# Patient Record
Sex: MALE | Race: WHITE | Employment: OTHER | URBAN - NONMETROPOLITAN AREA
[De-identification: names, ages, dates, MRNs, and addresses within clinical notes are randomized per-mention and may not be internally consistent; named-entity substitution may affect disease eponyms.]

---

## 2017-06-12 ENCOUNTER — HOSPITAL ENCOUNTER (OUTPATIENT)
Dept: PHYSICAL THERAPY | Age: 82
Setting detail: THERAPIES SERIES
Discharge: HOME | End: 2017-06-12
Payer: MEDICARE

## 2017-06-12 PROCEDURE — G8979 MOBILITY GOAL STATUS: HCPCS

## 2017-06-12 PROCEDURE — G8978 MOBILITY CURRENT STATUS: HCPCS

## 2017-06-12 PROCEDURE — 97161 PT EVAL LOW COMPLEX 20 MIN: CPT

## 2017-06-12 PROCEDURE — 97110 THERAPEUTIC EXERCISES: CPT

## 2017-06-12 ASSESSMENT — PAIN DESCRIPTION - DESCRIPTORS: DESCRIPTORS: SHARP;DULL

## 2017-06-12 ASSESSMENT — PAIN SCALES - GENERAL: PAINLEVEL_OUTOF10: 2

## 2017-06-12 ASSESSMENT — PAIN DESCRIPTION - FREQUENCY: FREQUENCY: CONTINUOUS

## 2017-06-12 ASSESSMENT — PAIN DESCRIPTION - ORIENTATION: ORIENTATION: RIGHT

## 2017-06-12 ASSESSMENT — PAIN DESCRIPTION - PAIN TYPE: TYPE: ACUTE PAIN

## 2017-06-12 ASSESSMENT — PAIN DESCRIPTION - ONSET: ONSET: SUDDEN

## 2017-06-15 ENCOUNTER — HOSPITAL ENCOUNTER (OUTPATIENT)
Dept: PHYSICAL THERAPY | Age: 82
Setting detail: THERAPIES SERIES
Discharge: HOME | End: 2017-06-15
Payer: MEDICARE

## 2017-06-15 PROCEDURE — 97113 AQUATIC THERAPY/EXERCISES: CPT

## 2017-06-15 ASSESSMENT — PAIN DESCRIPTION - LOCATION: LOCATION: HIP

## 2017-06-15 ASSESSMENT — PAIN DESCRIPTION - ORIENTATION: ORIENTATION: RIGHT

## 2017-06-15 ASSESSMENT — PAIN DESCRIPTION - PAIN TYPE: TYPE: ACUTE PAIN

## 2017-06-15 ASSESSMENT — PAIN SCALES - GENERAL: PAINLEVEL_OUTOF10: 2

## 2017-06-16 ENCOUNTER — HOSPITAL ENCOUNTER (OUTPATIENT)
Dept: PHYSICAL THERAPY | Age: 82
Setting detail: THERAPIES SERIES
Discharge: HOME | End: 2017-06-16
Payer: MEDICARE

## 2017-06-16 PROCEDURE — 97113 AQUATIC THERAPY/EXERCISES: CPT

## 2017-06-16 ASSESSMENT — PAIN DESCRIPTION - ORIENTATION: ORIENTATION: RIGHT

## 2017-06-16 ASSESSMENT — PAIN DESCRIPTION - LOCATION: LOCATION: HIP

## 2017-06-16 ASSESSMENT — PAIN SCALES - GENERAL: PAINLEVEL_OUTOF10: 2

## 2017-06-19 ENCOUNTER — HOSPITAL ENCOUNTER (OUTPATIENT)
Dept: PHYSICAL THERAPY | Age: 82
Setting detail: THERAPIES SERIES
Discharge: HOME | End: 2017-06-19
Payer: MEDICARE

## 2017-06-19 PROCEDURE — 97113 AQUATIC THERAPY/EXERCISES: CPT

## 2017-06-21 ENCOUNTER — HOSPITAL ENCOUNTER (OUTPATIENT)
Dept: PHYSICAL THERAPY | Age: 82
Setting detail: THERAPIES SERIES
Discharge: HOME | End: 2017-06-21
Payer: MEDICARE

## 2017-06-21 PROCEDURE — 97110 THERAPEUTIC EXERCISES: CPT

## 2017-06-21 PROCEDURE — G0283 ELEC STIM OTHER THAN WOUND: HCPCS

## 2017-06-21 ASSESSMENT — PAIN SCALES - GENERAL: PAINLEVEL_OUTOF10: 2

## 2017-06-21 ASSESSMENT — PAIN DESCRIPTION - ORIENTATION: ORIENTATION: RIGHT

## 2017-06-21 ASSESSMENT — PAIN DESCRIPTION - PAIN TYPE: TYPE: ACUTE PAIN

## 2017-06-21 ASSESSMENT — PAIN DESCRIPTION - LOCATION: LOCATION: HIP

## 2017-06-23 ENCOUNTER — HOSPITAL ENCOUNTER (OUTPATIENT)
Dept: PHYSICAL THERAPY | Age: 82
Setting detail: THERAPIES SERIES
Discharge: HOME | End: 2017-06-23
Payer: MEDICARE

## 2017-06-23 PROCEDURE — 97113 AQUATIC THERAPY/EXERCISES: CPT

## 2017-06-23 ASSESSMENT — PAIN DESCRIPTION - ORIENTATION: ORIENTATION: RIGHT

## 2017-06-23 ASSESSMENT — PAIN DESCRIPTION - LOCATION: LOCATION: HIP

## 2017-06-23 ASSESSMENT — PAIN SCALES - GENERAL: PAINLEVEL_OUTOF10: 2

## 2017-06-23 ASSESSMENT — PAIN DESCRIPTION - DESCRIPTORS: DESCRIPTORS: ACHING;DULL

## 2017-06-26 ENCOUNTER — HOSPITAL ENCOUNTER (OUTPATIENT)
Dept: PHYSICAL THERAPY | Age: 82
Setting detail: THERAPIES SERIES
Discharge: HOME | End: 2017-06-26
Payer: MEDICARE

## 2017-06-26 PROCEDURE — 97113 AQUATIC THERAPY/EXERCISES: CPT

## 2017-06-26 ASSESSMENT — PAIN SCALES - GENERAL: PAINLEVEL_OUTOF10: 2

## 2017-06-26 ASSESSMENT — PAIN DESCRIPTION - LOCATION: LOCATION: HIP

## 2017-06-26 ASSESSMENT — PAIN DESCRIPTION - PAIN TYPE: TYPE: ACUTE PAIN

## 2017-06-26 ASSESSMENT — PAIN DESCRIPTION - DESCRIPTORS: DESCRIPTORS: ACHING

## 2017-06-26 ASSESSMENT — PAIN DESCRIPTION - ORIENTATION: ORIENTATION: RIGHT

## 2017-06-28 ENCOUNTER — HOSPITAL ENCOUNTER (OUTPATIENT)
Dept: PHYSICAL THERAPY | Age: 82
Setting detail: THERAPIES SERIES
Discharge: HOME | End: 2017-06-28
Payer: MEDICARE

## 2017-06-28 PROCEDURE — G8979 MOBILITY GOAL STATUS: HCPCS

## 2017-06-28 PROCEDURE — 97110 THERAPEUTIC EXERCISES: CPT

## 2017-06-28 PROCEDURE — G8978 MOBILITY CURRENT STATUS: HCPCS

## 2017-06-28 ASSESSMENT — PAIN DESCRIPTION - ORIENTATION: ORIENTATION: RIGHT

## 2017-06-28 ASSESSMENT — PAIN DESCRIPTION - PAIN TYPE: TYPE: ACUTE PAIN

## 2017-06-28 ASSESSMENT — PAIN DESCRIPTION - DESCRIPTORS: DESCRIPTORS: ACHING

## 2017-06-28 ASSESSMENT — PAIN DESCRIPTION - LOCATION: LOCATION: HIP

## 2017-06-28 ASSESSMENT — PAIN DESCRIPTION - PROGRESSION: CLINICAL_PROGRESSION: RAPIDLY IMPROVING

## 2017-06-28 ASSESSMENT — PAIN DESCRIPTION - FREQUENCY: FREQUENCY: CONTINUOUS

## 2017-06-28 ASSESSMENT — PAIN SCALES - GENERAL: PAINLEVEL_OUTOF10: 1

## 2017-06-30 ENCOUNTER — HOSPITAL ENCOUNTER (OUTPATIENT)
Dept: PHYSICAL THERAPY | Age: 82
Setting detail: THERAPIES SERIES
Discharge: HOME | End: 2017-06-30
Payer: MEDICARE

## 2017-06-30 PROCEDURE — 97113 AQUATIC THERAPY/EXERCISES: CPT

## 2017-06-30 ASSESSMENT — PAIN DESCRIPTION - LOCATION: LOCATION: HIP

## 2017-06-30 ASSESSMENT — PAIN DESCRIPTION - DESCRIPTORS: DESCRIPTORS: ACHING

## 2017-06-30 ASSESSMENT — PAIN DESCRIPTION - PAIN TYPE: TYPE: ACUTE PAIN

## 2017-06-30 ASSESSMENT — PAIN SCALES - GENERAL: PAINLEVEL_OUTOF10: 2

## 2017-06-30 ASSESSMENT — PAIN DESCRIPTION - ORIENTATION: ORIENTATION: RIGHT

## 2017-07-03 ENCOUNTER — HOSPITAL ENCOUNTER (OUTPATIENT)
Dept: PHYSICAL THERAPY | Age: 82
Setting detail: THERAPIES SERIES
Discharge: HOME | End: 2017-07-03
Payer: MEDICARE

## 2017-07-03 PROCEDURE — 97113 AQUATIC THERAPY/EXERCISES: CPT

## 2017-07-03 ASSESSMENT — PAIN DESCRIPTION - DESCRIPTORS: DESCRIPTORS: ACHING

## 2017-07-03 ASSESSMENT — PAIN DESCRIPTION - PAIN TYPE: TYPE: ACUTE PAIN

## 2017-07-03 ASSESSMENT — PAIN DESCRIPTION - ORIENTATION: ORIENTATION: RIGHT

## 2017-07-03 ASSESSMENT — PAIN SCALES - GENERAL: PAINLEVEL_OUTOF10: 1

## 2017-07-03 ASSESSMENT — PAIN DESCRIPTION - LOCATION: LOCATION: HIP

## 2017-07-05 ENCOUNTER — HOSPITAL ENCOUNTER (OUTPATIENT)
Dept: PHYSICAL THERAPY | Age: 82
Setting detail: THERAPIES SERIES
Discharge: HOME | End: 2017-07-05
Payer: MEDICARE

## 2017-07-05 PROCEDURE — 97110 THERAPEUTIC EXERCISES: CPT

## 2017-07-05 ASSESSMENT — PAIN DESCRIPTION - LOCATION: LOCATION: HIP

## 2017-07-05 ASSESSMENT — PAIN DESCRIPTION - ORIENTATION: ORIENTATION: RIGHT

## 2017-07-05 ASSESSMENT — PAIN SCALES - GENERAL: PAINLEVEL_OUTOF10: 1

## 2017-07-07 ENCOUNTER — HOSPITAL ENCOUNTER (OUTPATIENT)
Dept: PHYSICAL THERAPY | Age: 82
Setting detail: THERAPIES SERIES
Discharge: HOME | End: 2017-07-07
Payer: MEDICARE

## 2017-07-07 PROCEDURE — 97113 AQUATIC THERAPY/EXERCISES: CPT

## 2017-07-07 ASSESSMENT — PAIN DESCRIPTION - DESCRIPTORS: DESCRIPTORS: ACHING

## 2017-07-07 ASSESSMENT — PAIN DESCRIPTION - ORIENTATION: ORIENTATION: RIGHT

## 2017-07-07 ASSESSMENT — PAIN SCALES - GENERAL: PAINLEVEL_OUTOF10: 1

## 2017-07-07 ASSESSMENT — PAIN DESCRIPTION - PAIN TYPE: TYPE: ACUTE PAIN

## 2017-07-07 ASSESSMENT — PAIN DESCRIPTION - LOCATION: LOCATION: HIP

## 2017-10-31 ENCOUNTER — HOSPITAL ENCOUNTER (OUTPATIENT)
Age: 82
Discharge: HOME | End: 2017-10-31
Payer: MEDICARE

## 2017-10-31 ENCOUNTER — HOSPITAL ENCOUNTER (OUTPATIENT)
Dept: GENERAL RADIOLOGY | Age: 82
Discharge: HOME | End: 2017-10-31
Payer: MEDICARE

## 2017-10-31 DIAGNOSIS — R06.02 SOB (SHORTNESS OF BREATH): ICD-10-CM

## 2017-10-31 DIAGNOSIS — R07.89 CHEST PRESSURE: ICD-10-CM

## 2017-10-31 PROCEDURE — 71020 XR CHEST STANDARD TWO VW: CPT

## 2017-11-27 ENCOUNTER — HOSPITAL ENCOUNTER (OUTPATIENT)
Dept: NON INVASIVE DIAGNOSTICS | Age: 82
Discharge: HOME | End: 2017-11-27
Payer: MEDICARE

## 2017-11-27 PROCEDURE — 93017 CV STRESS TEST TRACING ONLY: CPT

## 2017-11-27 PROCEDURE — A9500 TC99M SESTAMIBI: HCPCS | Performed by: INTERNAL MEDICINE

## 2017-11-27 PROCEDURE — 78452 HT MUSCLE IMAGE SPECT MULT: CPT

## 2017-11-27 PROCEDURE — 3430000000 HC RX DIAGNOSTIC RADIOPHARMACEUTICAL: Performed by: INTERNAL MEDICINE

## 2017-11-27 RX ADMIN — Medication 33.1 MILLICURIE: at 09:47

## 2017-11-28 ENCOUNTER — HOSPITAL ENCOUNTER (OUTPATIENT)
Dept: NON INVASIVE DIAGNOSTICS | Age: 82
Discharge: HOME | End: 2017-11-28
Payer: MEDICARE

## 2017-11-28 PROCEDURE — 3430000000 HC RX DIAGNOSTIC RADIOPHARMACEUTICAL: Performed by: INTERNAL MEDICINE

## 2017-11-28 PROCEDURE — A9500 TC99M SESTAMIBI: HCPCS | Performed by: INTERNAL MEDICINE

## 2017-11-28 RX ADMIN — Medication 32.1 MILLICURIE: at 13:09

## 2017-11-30 NOTE — PROCEDURES
[facility] [address], [address]                                CARDIAC STRESS TEST    PATIENT NAME: Irene Gibson                        :        1935  MED REC NO:   [id number]                              ROOM:  ACCOUNT NO:   [de-identified]                           ADMIT DATE: 2017  PROVIDER:     Brandyn Cortes [facility]    CARDIOVASCULAR DIAGNOSTIC DEPARTMENT    DATE OF STUDY:  2017    ORDERING PROVIDER:  Mervat Sinclair MD    PRIMARY CARE PROVIDER:  Mervat Sinclair MD    INTERPRETING PHYSICIAN:  Brandyn Villareal. Kris Gavin MD    EXERCISE MYOCARDIAL PERFUSION STRESS TEST REPORT    Stress/rest single-isotope SPECT imaging with exercise stress and gated  SPECT imaging    INDICATION:  Assessment of recent chest pain and/or discomfort  Assessment of a cardiac cause of:  Dyspnea    CLINICAL HISTORY:  The patient is a 80-year-old man with no known coronary  artery disease. Previous cardiac history includes:  Stress test    Other previous history includes:  Chest pain, emphysema, dyspnea,  lightheadedness    Symptoms just prior to testing included:  None    Relevant medications:  None    PROCEDURE:  The patient performed treadmill exercise using a Tashi  protocol, completing 2:04 minutes and completing an estimated workload of  6.64 metabolic equivalents (METS). The test was terminated due to fatigue and shortness of breath. The heart rate was 78 beats per minute at baseline and increased to 127  beats per minute at peak exercise, which was 92% of the maximum predicted  heart rate. The rest blood pressure was 140/70 mmHg and increased to 212/60  mmHg, which is a hypertensive response. During the procedure, the patient  developed fatigue and shortness of breath but denied any chest discomfort. Myocardial perfusion imaging: Imaging was performed at rest 30-45 minutes  following the injection of 32.1 mCi of sestamibi.   At peak exercise,

## 2018-05-15 ENCOUNTER — HOSPITAL ENCOUNTER (OUTPATIENT)
Age: 83
Discharge: HOME | End: 2018-05-17
Payer: MEDICARE

## 2018-05-15 ENCOUNTER — HOSPITAL ENCOUNTER (OUTPATIENT)
Dept: GENERAL RADIOLOGY | Age: 83
Discharge: HOME | End: 2018-05-17
Payer: MEDICARE

## 2018-05-15 DIAGNOSIS — R52 PAIN: ICD-10-CM

## 2018-05-15 PROCEDURE — 72050 X-RAY EXAM NECK SPINE 4/5VWS: CPT

## 2018-06-08 ENCOUNTER — HOSPITAL ENCOUNTER (OUTPATIENT)
Dept: CT IMAGING | Age: 83
Discharge: HOME | End: 2018-06-10
Payer: MEDICARE

## 2018-06-08 DIAGNOSIS — R26.81 UNSTEADY GAIT: ICD-10-CM

## 2018-06-08 DIAGNOSIS — R42 DIZZINESS: ICD-10-CM

## 2018-06-08 DIAGNOSIS — R51.9 NONINTRACTABLE HEADACHE, UNSPECIFIED CHRONICITY PATTERN, UNSPECIFIED HEADACHE TYPE: ICD-10-CM

## 2018-06-08 PROCEDURE — 70450 CT HEAD/BRAIN W/O DYE: CPT

## 2018-07-27 ENCOUNTER — HOSPITAL ENCOUNTER (OUTPATIENT)
Dept: LAB | Age: 83
Discharge: HOME | End: 2018-07-27
Payer: MEDICARE

## 2018-07-27 ENCOUNTER — HOSPITAL ENCOUNTER (OUTPATIENT)
Dept: CT IMAGING | Age: 83
Discharge: HOME | End: 2018-07-29
Payer: MEDICARE

## 2018-07-27 DIAGNOSIS — R63.4 UNEXPLAINED WEIGHT LOSS: ICD-10-CM

## 2018-07-27 LAB
CREAT SERPL-MCNC: 1.83 MG/DL (ref 0.7–1.2)
GFR AFRICAN AMERICAN: 43 ML/MIN
GFR NON-AFRICAN AMERICAN: 36 ML/MIN
GFR SERPL CREATININE-BSD FRML MDRD: ABNORMAL ML/MIN/{1.73_M2}
GFR SERPL CREATININE-BSD FRML MDRD: ABNORMAL ML/MIN/{1.73_M2}

## 2018-07-27 PROCEDURE — 74176 CT ABD & PELVIS W/O CONTRAST: CPT

## 2018-07-27 PROCEDURE — 82565 ASSAY OF CREATININE: CPT

## 2018-07-27 PROCEDURE — 71250 CT THORAX DX C-: CPT

## 2018-07-27 PROCEDURE — 6360000004 HC RX CONTRAST MEDICATION: Performed by: INTERNAL MEDICINE

## 2018-07-27 PROCEDURE — 36415 COLL VENOUS BLD VENIPUNCTURE: CPT

## 2018-07-27 RX ADMIN — IOHEXOL 25 ML: 240 INJECTION, SOLUTION INTRATHECAL; INTRAVASCULAR; INTRAVENOUS; ORAL at 16:27

## 2018-08-13 ENCOUNTER — HOSPITAL ENCOUNTER (EMERGENCY)
Age: 83
Discharge: HOME | End: 2018-08-13
Attending: EMERGENCY MEDICINE
Payer: MEDICARE

## 2018-08-13 VITALS
TEMPERATURE: 97.7 F | SYSTOLIC BLOOD PRESSURE: 159 MMHG | OXYGEN SATURATION: 97 % | RESPIRATION RATE: 16 BRPM | HEART RATE: 80 BPM | DIASTOLIC BLOOD PRESSURE: 84 MMHG

## 2018-08-13 DIAGNOSIS — H81.10 BENIGN PAROXYSMAL POSITIONAL VERTIGO, UNSPECIFIED LATERALITY: Primary | ICD-10-CM

## 2018-08-13 LAB
ABSOLUTE EOS #: 0.18 K/UL (ref 0–0.44)
ABSOLUTE IMMATURE GRANULOCYTE: 0.03 K/UL (ref 0–0.3)
ABSOLUTE LYMPH #: 1.56 K/UL (ref 1.1–3.7)
ABSOLUTE MONO #: 0.58 K/UL (ref 0.1–1.2)
ANION GAP SERPL CALCULATED.3IONS-SCNC: 11 MMOL/L (ref 9–17)
BASOPHILS # BLD: 0 % (ref 0–2)
BASOPHILS ABSOLUTE: 0.03 K/UL (ref 0–0.2)
BNP INTERPRETATION: ABNORMAL
BUN BLDV-MCNC: 23 MG/DL (ref 8–23)
BUN/CREAT BLD: 16 (ref 9–20)
CALCIUM SERPL-MCNC: 9.8 MG/DL (ref 8.6–10.4)
CHLORIDE BLD-SCNC: 104 MMOL/L (ref 98–107)
CO2: 28 MMOL/L (ref 20–31)
CREAT SERPL-MCNC: 1.45 MG/DL (ref 0.7–1.2)
DIFFERENTIAL TYPE: ABNORMAL
EOSINOPHILS RELATIVE PERCENT: 3 % (ref 1–4)
GFR AFRICAN AMERICAN: 56 ML/MIN
GFR NON-AFRICAN AMERICAN: 47 ML/MIN
GFR SERPL CREATININE-BSD FRML MDRD: ABNORMAL ML/MIN/{1.73_M2}
GFR SERPL CREATININE-BSD FRML MDRD: ABNORMAL ML/MIN/{1.73_M2}
GLUCOSE BLD-MCNC: 88 MG/DL (ref 70–99)
HCT VFR BLD CALC: 42.1 % (ref 40.7–50.3)
HEMOGLOBIN: 13.5 G/DL (ref 13–17)
IMMATURE GRANULOCYTES: 0 %
LYMPHOCYTES # BLD: 22 % (ref 24–43)
MCH RBC QN AUTO: 26.3 PG (ref 25.2–33.5)
MCHC RBC AUTO-ENTMCNC: 32.1 G/DL (ref 28.4–34.8)
MCV RBC AUTO: 81.9 FL (ref 82.6–102.9)
MONOCYTES # BLD: 8 % (ref 3–12)
NRBC AUTOMATED: 0 PER 100 WBC
PDW BLD-RTO: 14 % (ref 11.8–14.4)
PLATELET # BLD: 254 K/UL (ref 138–453)
PLATELET ESTIMATE: ABNORMAL
PMV BLD AUTO: 10 FL (ref 8.1–13.5)
POTASSIUM SERPL-SCNC: 4.6 MMOL/L (ref 3.7–5.3)
PRO-BNP: 2042 PG/ML
RBC # BLD: 5.14 M/UL (ref 4.21–5.77)
RBC # BLD: ABNORMAL 10*6/UL
SEG NEUTROPHILS: 67 % (ref 36–65)
SEGMENTED NEUTROPHILS ABSOLUTE COUNT: 4.63 K/UL (ref 1.5–8.1)
SODIUM BLD-SCNC: 143 MMOL/L (ref 135–144)
WBC # BLD: 7 K/UL (ref 3.5–11.3)
WBC # BLD: ABNORMAL 10*3/UL

## 2018-08-13 PROCEDURE — 83880 ASSAY OF NATRIURETIC PEPTIDE: CPT

## 2018-08-13 PROCEDURE — 80048 BASIC METABOLIC PNL TOTAL CA: CPT

## 2018-08-13 PROCEDURE — 85025 COMPLETE CBC W/AUTO DIFF WBC: CPT

## 2018-08-13 PROCEDURE — 99283 EMERGENCY DEPT VISIT LOW MDM: CPT

## 2018-08-13 RX ORDER — MECLIZINE HYDROCHLORIDE 25 MG/1
25 TABLET ORAL 3 TIMES DAILY PRN
Qty: 30 TABLET | Refills: 0 | Status: SHIPPED | OUTPATIENT
Start: 2018-08-13 | End: 2018-08-23

## 2018-08-13 RX ORDER — LORAZEPAM 0.5 MG/1
0.5 TABLET ORAL NIGHTLY
Qty: 7 TABLET | Refills: 0 | Status: SHIPPED | OUTPATIENT
Start: 2018-08-13 | End: 2018-08-20

## 2018-08-13 RX ORDER — SIMVASTATIN 40 MG
40 TABLET ORAL NIGHTLY
COMMUNITY
End: 2019-01-18 | Stop reason: ALTCHOICE

## 2018-08-13 RX ORDER — BACLOFEN 10 MG/1
10 TABLET ORAL 2 TIMES DAILY
COMMUNITY
End: 2019-01-18 | Stop reason: ALTCHOICE

## 2018-08-13 NOTE — ED PROVIDER NOTES
Cardiovascular: Normal rate, regular rhythm and normal heart sounds. No murmur heard. Pulmonary/Chest: Effort normal and breath sounds normal. No respiratory distress. He has no wheezes. He has no rales. Abdominal: Soft. Bowel sounds are normal. He exhibits no distension. There is no tenderness. Musculoskeletal: Normal range of motion. He exhibits no edema or tenderness. Neurological: He is alert and oriented to person, place, and time. No cranial nerve deficit. He exhibits abnormal muscle tone. Coordination normal.   Skin: Skin is warm and dry. No rash noted. No erythema. No pallor. Nursing note and vitals reviewed. DIAGNOSTIC RESULTS     EKG: All EKG's are interpreted by the Emergency Department Physician who either signs or Co-signs this chart in the absence of a cardiologist.    RADIOLOGY:   Non-plain film images such as CT, Ultrasound and MRI are read by the radiologist. Plain radiographic images are visualized and preliminarily interpreted by the emergency physician with the below findings    Interpretation per the Radiologist below, if available at the time of this note:    No orders to display         ED BEDSIDE ULTRASOUND:   Performed by ED Physician - none    LABS:  Labs Reviewed   BASIC METABOLIC PANEL - Abnormal; Notable for the following:        Result Value    CREATININE 1.45 (*)     GFR Non- 47 (*)     GFR  56 (*)     All other components within normal limits   BRAIN NATRIURETIC PEPTIDE - Abnormal; Notable for the following:     Pro-BNP 2,042 (*)     All other components within normal limits   CBC WITH AUTO DIFFERENTIAL - Abnormal; Notable for the following:     MCV 81.9 (*)     Seg Neutrophils 67 (*)     Lymphocytes 22 (*)     All other components within normal limits       All other labs were within normal range or not returned as of this dictation.     EMERGENCY DEPARTMENT COURSE and DIFFERENTIAL DIAGNOSIS/MDM:   Vitals:    Vitals:    08/13/18 1508

## 2019-03-25 ENCOUNTER — OFFICE VISIT (OUTPATIENT)
Dept: CARDIOLOGY | Age: 84
End: 2019-03-25
Payer: MEDICARE

## 2019-03-25 ENCOUNTER — HOSPITAL ENCOUNTER (OUTPATIENT)
Dept: NON INVASIVE DIAGNOSTICS | Age: 84
Discharge: HOME | End: 2019-03-25
Payer: MEDICARE

## 2019-03-25 VITALS
SYSTOLIC BLOOD PRESSURE: 202 MMHG | RESPIRATION RATE: 16 BRPM | BODY MASS INDEX: 27.37 KG/M2 | WEIGHT: 180.6 LBS | DIASTOLIC BLOOD PRESSURE: 72 MMHG | HEART RATE: 65 BPM | OXYGEN SATURATION: 98 % | HEIGHT: 68 IN

## 2019-03-25 DIAGNOSIS — R06.02 SHORTNESS OF BREATH: ICD-10-CM

## 2019-03-25 DIAGNOSIS — R94.39 ABNORMAL STRESS TEST: ICD-10-CM

## 2019-03-25 DIAGNOSIS — R94.31 ABNORMAL EKG: ICD-10-CM

## 2019-03-25 DIAGNOSIS — I10 ESSENTIAL HYPERTENSION: ICD-10-CM

## 2019-03-25 DIAGNOSIS — I10 ESSENTIAL HYPERTENSION: Primary | ICD-10-CM

## 2019-03-25 LAB
LV EF: 55 %
LVEF MODALITY: NORMAL

## 2019-03-25 PROCEDURE — 1101F PT FALLS ASSESS-DOCD LE1/YR: CPT | Performed by: INTERNAL MEDICINE

## 2019-03-25 PROCEDURE — 99203 OFFICE O/P NEW LOW 30 MIN: CPT | Performed by: INTERNAL MEDICINE

## 2019-03-25 PROCEDURE — G8482 FLU IMMUNIZE ORDER/ADMIN: HCPCS | Performed by: INTERNAL MEDICINE

## 2019-03-25 PROCEDURE — 93306 TTE W/DOPPLER COMPLETE: CPT

## 2019-03-25 PROCEDURE — 93000 ELECTROCARDIOGRAM COMPLETE: CPT | Performed by: INTERNAL MEDICINE

## 2019-03-25 PROCEDURE — G8419 CALC BMI OUT NRM PARAM NOF/U: HCPCS | Performed by: INTERNAL MEDICINE

## 2019-03-25 PROCEDURE — G8427 DOCREV CUR MEDS BY ELIG CLIN: HCPCS | Performed by: INTERNAL MEDICINE

## 2019-03-25 RX ORDER — ATORVASTATIN CALCIUM 20 MG/1
20 TABLET, FILM COATED ORAL DAILY
Qty: 90 TABLET | Refills: 3 | Status: ON HOLD | OUTPATIENT
Start: 2019-03-25 | End: 2019-03-30 | Stop reason: SDUPTHER

## 2019-03-25 RX ORDER — CHLORAL HYDRATE 500 MG
3000 CAPSULE ORAL DAILY
COMMUNITY

## 2019-03-25 RX ORDER — AMLODIPINE BESYLATE 5 MG/1
5 TABLET ORAL DAILY
Qty: 90 TABLET | Refills: 3 | Status: SHIPPED | OUTPATIENT
Start: 2019-03-25 | End: 2020-03-19 | Stop reason: SDUPTHER

## 2019-03-25 RX ORDER — ISOSORBIDE MONONITRATE 30 MG/1
30 TABLET, EXTENDED RELEASE ORAL DAILY
Qty: 90 TABLET | Refills: 3 | Status: SHIPPED | OUTPATIENT
Start: 2019-03-25 | End: 2019-07-03

## 2019-03-27 ENCOUNTER — TELEPHONE (OUTPATIENT)
Dept: CARDIOLOGY | Age: 84
End: 2019-03-27

## 2019-03-28 ENCOUNTER — HOSPITAL ENCOUNTER (INPATIENT)
Dept: CARDIAC CATH/INVASIVE PROCEDURES | Age: 84
LOS: 2 days | Discharge: HOME | DRG: 983 | End: 2019-04-01
Attending: INTERNAL MEDICINE | Admitting: INTERNAL MEDICINE
Payer: MEDICARE

## 2019-03-28 ENCOUNTER — HOSPITAL ENCOUNTER (OUTPATIENT)
Dept: CARDIAC CATH/INVASIVE PROCEDURES | Age: 84
Discharge: ANOTHER HOSPITAL | End: 2019-03-28
Attending: FAMILY MEDICINE | Admitting: FAMILY MEDICINE
Payer: MEDICARE

## 2019-03-28 VITALS
HEIGHT: 68 IN | BODY MASS INDEX: 27.28 KG/M2 | RESPIRATION RATE: 14 BRPM | TEMPERATURE: 96.8 F | WEIGHT: 180 LBS | DIASTOLIC BLOOD PRESSURE: 84 MMHG | SYSTOLIC BLOOD PRESSURE: 169 MMHG | HEART RATE: 59 BPM | OXYGEN SATURATION: 97 %

## 2019-03-28 DIAGNOSIS — Z95.820 S/P ANGIOPLASTY WITH STENT: ICD-10-CM

## 2019-03-28 PROBLEM — R94.39 ABNORMAL STRESS TEST: Status: ACTIVE | Noted: 2017-01-01

## 2019-03-28 LAB — ACTIVATED CLOTTING TIME: 318 SEC (ref 79–149)

## 2019-03-28 PROCEDURE — C9600 PERC DRUG-EL COR STENT SING: HCPCS | Performed by: INTERNAL MEDICINE

## 2019-03-28 PROCEDURE — C1769 GUIDE WIRE: HCPCS

## 2019-03-28 PROCEDURE — 6360000002 HC RX W HCPCS

## 2019-03-28 PROCEDURE — 85347 COAGULATION TIME ACTIVATED: CPT

## 2019-03-28 PROCEDURE — C1894 INTRO/SHEATH, NON-LASER: HCPCS

## 2019-03-28 PROCEDURE — C1874 STENT, COATED/COV W/DEL SYS: HCPCS

## 2019-03-28 PROCEDURE — C1887 CATHETER, GUIDING: HCPCS

## 2019-03-28 PROCEDURE — 2580000003 HC RX 258: Performed by: FAMILY MEDICINE

## 2019-03-28 PROCEDURE — 6370000000 HC RX 637 (ALT 250 FOR IP)

## 2019-03-28 PROCEDURE — 4A023N8 MEASUREMENT OF CARDIAC SAMPLING AND PRESSURE, BILATERAL, PERCUTANEOUS APPROACH: ICD-10-PCS | Performed by: INTERNAL MEDICINE

## 2019-03-28 PROCEDURE — 93459 L HRT ART/GRFT ANGIO: CPT | Performed by: FAMILY MEDICINE

## 2019-03-28 PROCEDURE — B2151ZZ FLUOROSCOPY OF LEFT HEART USING LOW OSMOLAR CONTRAST: ICD-10-PCS | Performed by: INTERNAL MEDICINE

## 2019-03-28 PROCEDURE — 2580000003 HC RX 258: Performed by: STUDENT IN AN ORGANIZED HEALTH CARE EDUCATION/TRAINING PROGRAM

## 2019-03-28 PROCEDURE — 7100000011 HC PHASE II RECOVERY - ADDTL 15 MIN

## 2019-03-28 PROCEDURE — 2709999900 HC NON-CHARGEABLE SUPPLY

## 2019-03-28 PROCEDURE — B2111ZZ FLUOROSCOPY OF MULTIPLE CORONARY ARTERIES USING LOW OSMOLAR CONTRAST: ICD-10-PCS | Performed by: INTERNAL MEDICINE

## 2019-03-28 PROCEDURE — 027135Z DILATION OF CORONARY ARTERY, TWO ARTERIES WITH TWO DRUG-ELUTING INTRALUMINAL DEVICES, PERCUTANEOUS APPROACH: ICD-10-PCS | Performed by: INTERNAL MEDICINE

## 2019-03-28 PROCEDURE — G0378 HOSPITAL OBSERVATION PER HR: HCPCS

## 2019-03-28 PROCEDURE — 7100000010 HC PHASE II RECOVERY - FIRST 15 MIN

## 2019-03-28 PROCEDURE — 6360000004 HC RX CONTRAST MEDICATION

## 2019-03-28 PROCEDURE — 2500000003 HC RX 250 WO HCPCS

## 2019-03-28 PROCEDURE — 93458 L HRT ARTERY/VENTRICLE ANGIO: CPT | Performed by: FAMILY MEDICINE

## 2019-03-28 RX ORDER — ACETAMINOPHEN 325 MG/1
650 TABLET ORAL EVERY 4 HOURS PRN
Status: DISCONTINUED | OUTPATIENT
Start: 2019-03-28 | End: 2019-03-28 | Stop reason: HOSPADM

## 2019-03-28 RX ORDER — MORPHINE SULFATE 2 MG/ML
2 INJECTION, SOLUTION INTRAMUSCULAR; INTRAVENOUS ONCE
Status: COMPLETED | OUTPATIENT
Start: 2019-03-29 | End: 2019-03-28

## 2019-03-28 RX ORDER — SODIUM CHLORIDE 9 MG/ML
INJECTION, SOLUTION INTRAVENOUS CONTINUOUS
Status: DISCONTINUED | OUTPATIENT
Start: 2019-03-28 | End: 2019-03-28 | Stop reason: HOSPADM

## 2019-03-28 RX ORDER — SODIUM CHLORIDE 0.9 % (FLUSH) 0.9 %
10 SYRINGE (ML) INJECTION PRN
Status: DISCONTINUED | OUTPATIENT
Start: 2019-03-28 | End: 2019-04-01 | Stop reason: HOSPADM

## 2019-03-28 RX ORDER — DIPHENHYDRAMINE HCL 25 MG
50 CAPSULE ORAL ONCE
Status: DISCONTINUED | OUTPATIENT
Start: 2019-03-28 | End: 2019-03-28 | Stop reason: HOSPADM

## 2019-03-28 RX ORDER — ACETAMINOPHEN 325 MG/1
650 TABLET ORAL EVERY 4 HOURS PRN
Status: DISCONTINUED | OUTPATIENT
Start: 2019-03-28 | End: 2019-04-01 | Stop reason: HOSPADM

## 2019-03-28 RX ORDER — CHLORAL HYDRATE 500 MG
3000 CAPSULE ORAL DAILY
Status: DISCONTINUED | OUTPATIENT
Start: 2019-03-28 | End: 2019-03-28

## 2019-03-28 RX ORDER — SODIUM CHLORIDE 0.9 % (FLUSH) 0.9 %
10 SYRINGE (ML) INJECTION EVERY 12 HOURS SCHEDULED
Status: DISCONTINUED | OUTPATIENT
Start: 2019-03-28 | End: 2019-03-28 | Stop reason: HOSPADM

## 2019-03-28 RX ORDER — SODIUM CHLORIDE 0.9 % (FLUSH) 0.9 %
10 SYRINGE (ML) INJECTION EVERY 12 HOURS SCHEDULED
Status: DISCONTINUED | OUTPATIENT
Start: 2019-03-28 | End: 2019-04-01 | Stop reason: HOSPADM

## 2019-03-28 RX ORDER — LANOLIN ALCOHOL/MO/W.PET/CERES
325 CREAM (GRAM) TOPICAL
Status: DISCONTINUED | OUTPATIENT
Start: 2019-03-29 | End: 2019-04-01 | Stop reason: HOSPADM

## 2019-03-28 RX ORDER — ATORVASTATIN CALCIUM 20 MG/1
20 TABLET, FILM COATED ORAL DAILY
Status: DISCONTINUED | OUTPATIENT
Start: 2019-03-28 | End: 2019-03-30

## 2019-03-28 RX ORDER — ISOSORBIDE MONONITRATE 30 MG/1
30 TABLET, EXTENDED RELEASE ORAL DAILY
Status: DISCONTINUED | OUTPATIENT
Start: 2019-03-28 | End: 2019-04-01 | Stop reason: HOSPADM

## 2019-03-28 RX ORDER — AMLODIPINE BESYLATE 5 MG/1
5 TABLET ORAL DAILY
Status: DISCONTINUED | OUTPATIENT
Start: 2019-03-28 | End: 2019-04-01 | Stop reason: HOSPADM

## 2019-03-28 RX ORDER — MORPHINE SULFATE 2 MG/ML
2 INJECTION, SOLUTION INTRAMUSCULAR; INTRAVENOUS ONCE
Status: COMPLETED | OUTPATIENT
Start: 2019-03-28 | End: 2019-03-28

## 2019-03-28 RX ORDER — HYDROCODONE BITARTRATE AND ACETAMINOPHEN 5; 325 MG/1; MG/1
TABLET ORAL
Status: COMPLETED
Start: 2019-03-28 | End: 2019-03-28

## 2019-03-28 RX ORDER — ASPIRIN 81 MG/1
81 TABLET ORAL DAILY
Status: DISCONTINUED | OUTPATIENT
Start: 2019-03-28 | End: 2019-04-01 | Stop reason: HOSPADM

## 2019-03-28 RX ORDER — HYDROCODONE BITARTRATE AND ACETAMINOPHEN 5; 325 MG/1; MG/1
1 TABLET ORAL ONCE
Status: COMPLETED | OUTPATIENT
Start: 2019-03-28 | End: 2019-03-28

## 2019-03-28 RX ORDER — MORPHINE SULFATE 2 MG/ML
INJECTION, SOLUTION INTRAMUSCULAR; INTRAVENOUS
Status: COMPLETED
Start: 2019-03-28 | End: 2019-03-28

## 2019-03-28 RX ORDER — SODIUM CHLORIDE 0.9 % (FLUSH) 0.9 %
10 SYRINGE (ML) INJECTION PRN
Status: DISCONTINUED | OUTPATIENT
Start: 2019-03-28 | End: 2019-03-28 | Stop reason: HOSPADM

## 2019-03-28 RX ORDER — NITROGLYCERIN 0.4 MG/1
0.4 TABLET SUBLINGUAL EVERY 5 MIN PRN
Status: DISCONTINUED | OUTPATIENT
Start: 2019-03-28 | End: 2019-03-28 | Stop reason: HOSPADM

## 2019-03-28 RX ORDER — CLOPIDOGREL BISULFATE 75 MG/1
75 TABLET ORAL DAILY
Status: DISCONTINUED | OUTPATIENT
Start: 2019-03-29 | End: 2019-04-01 | Stop reason: HOSPADM

## 2019-03-28 RX ORDER — NITROGLYCERIN 0.4 MG/1
0.4 TABLET SUBLINGUAL EVERY 5 MIN PRN
Status: DISCONTINUED | OUTPATIENT
Start: 2019-03-28 | End: 2019-04-01 | Stop reason: HOSPADM

## 2019-03-28 RX ORDER — SODIUM CHLORIDE 9 MG/ML
INJECTION, SOLUTION INTRAVENOUS CONTINUOUS
Status: DISCONTINUED | OUTPATIENT
Start: 2019-03-28 | End: 2019-04-01 | Stop reason: HOSPADM

## 2019-03-28 RX ADMIN — MORPHINE SULFATE 2 MG: 2 INJECTION, SOLUTION INTRAMUSCULAR; INTRAVENOUS at 22:33

## 2019-03-28 RX ADMIN — Medication 10 ML: at 22:47

## 2019-03-28 RX ADMIN — SODIUM CHLORIDE: 9 INJECTION, SOLUTION INTRAVENOUS at 09:26

## 2019-03-28 RX ADMIN — MORPHINE SULFATE 2 MG: 2 INJECTION, SOLUTION INTRAMUSCULAR; INTRAVENOUS at 23:50

## 2019-03-28 RX ADMIN — HYDROCODONE BITARTRATE AND ACETAMINOPHEN 1 TABLET: 5; 325 TABLET ORAL at 22:33

## 2019-03-28 ASSESSMENT — PAIN SCALES - GENERAL
PAINLEVEL_OUTOF10: 10
PAINLEVEL_OUTOF10: 0
PAINLEVEL_OUTOF10: 10
PAINLEVEL_OUTOF10: 10

## 2019-03-28 ASSESSMENT — PAIN DESCRIPTION - LOCATION: LOCATION: ABDOMEN

## 2019-03-28 NOTE — PROGRESS NOTES
Arterial sheath in place to right radial artery. Harringotn system to heparinized saline pressure bag, manually flushed every 5 minutes.

## 2019-03-28 NOTE — PROGRESS NOTES
Patient admitted, consent signed and questions answered. Patient ready for procedure. Call light to reach with side rails up 2 of 2. History and physical needs updated.

## 2019-03-28 NOTE — H&P
MG tablet Take 1 tablet by mouth daily 3/25/19   Jennifer De León MD   amLODIPine [facility]) 5 MG tablet Take 1 tablet by mouth daily 3/25/19   Jennifer De León MD   isosorbide mononitrate (IMDUR) 30 MG extended release tablet Take 1 tablet by mouth daily 3/25/19   Jennifer De León MD   metoprolol tartrate (LOPRESSOR) 25 MG tablet Take 1 tablet by mouth 2 times daily 2/21/19   GINETTE Mckenna CNP   nitroGLYCERIN (NITROSTAT) 0.4 MG SL tablet Place 1 tablet under the tongue every 5 minutes as needed for Chest pain 2/21/19   GINETTE Mckenna CNP   ferrous sulfate 325 (65 Fe) MG tablet Take 325 mg by mouth daily (with breakfast)    Historical Provider, MD   aspirin 81 MG EC tablet Take 81 mg by mouth daily. Historical Provider, MD       Allergies   Allergen Reactions    Penicillins Swelling and Rash         REVIEW OF SYSTEMS:     A detailed review of system was performed as already noted and is otherwise as above. PHYSICAL EXAM:     Vitals:    03/28/19 1344   BP: (!) 171/86   Pulse: 66   Resp: 14   SpO2: 97%        Constitutional and General Appearance: alert, cooperative, no distress and appears stated age  HEENT: PERRL, no cervical lymphadenopathy. No masses palpable. Normal oral mucosa  Respiratory:  · Normal excursion and expansion without use of accessory muscles  · Resp Auscultation: Good respiratory effort. No for increased work of breathing. On auscultation: clear to auscultation bilaterally  Cardiovascular:  · The apical impulse is not displaced  · Heart tones are crisp and normal. regular S1 and S2.  · Jugular venous pulsation Normal  · The carotid upstroke is normal in amplitude and contour without delay or bruit  · Peripheral pulses are symmetrical and full  Abdomen:  · No masses or tenderness  · Bowel sounds present  Extremities:  ·  No Cyanosis or Clubbing  ·  Lower extremity edema: No  · Skin: Warm and dry  Neurological:  · Alert and oriented.   · Moves all extremities

## 2019-03-28 NOTE — H&P
Patient examined the patient and have reviewed H&P the from 3/25/19 and I agree with the current assessment and plan with no significant change in the patients condition.

## 2019-03-28 NOTE — OP NOTE
[facility]        Date:   3/28/2019  Patient name:  Sobia Davis  Date of admission:  No admission date for patient encounter. MRN:   [id number]  YOB: 1935    CARDIAC CATHETERIZATION    Operators:  Bianca Clancy MD  Procedure performed:     [x] Left Heart Catheterization. [] Graft Angiography. [x] Left Ventriculography. [] Right Heart Catheterization. [x] Coronary Angiography. [] Aortic Valve Studies. [x] PCI:      [] Other:       Pre Procedure Conscious Sedation Data:    ASA Class:    [] I [x] II [] III [] IV    Mallampati Class:  [] I [x] II [] III [] IV      Indication:  [] STEMI      [] + Stress test  [] ACS      [] + EKG Changes  [] Non Q MI       [] Significant Risk Factors  [x] Recurrent Angina             [] Diabetes Mellitus    [] New LBBB      [] Uncontrolled HTN. [] CHF / Low EF changes     [] Abnormal CTA / Ca Score  [] Other:     Procedure:  Access:  [] Femoral artery  [x] Radial  artery       [x] Right   [] Left    Procedure: After informed consent was obtained with explanation of the risks and benefits, patient was brought to the cath lab. The access area was prepped and draped in sterile fashion. 1% lidocaine was used for local block. The artery was cannulated with 6  Fr sheath with brisk arterial blood return. The side port was frequently flushed and aspirated with normal saline. Findings:    LAD:       Lesion on Mid LAD: Mid subsection. 75% stenosis 5 mm length reduced to 0%.    Pre procedure ASHLEY III flow was noted. Post Procedure ASHLEY III flow was    present. Good runoff was present. The lesion was diagnosed as High Risk    (C).      Devices used       - Alereon Prowater Flex 180 cm. Number of passes: 1.       - Xience Alycia 3.25 x 8 OMAR. 1 inflation(s) to a max pressure of: 12    lb.     RCA:       Lesion on Mid RCA: Mid subsection. 70% stenosis 4 mm length reduced to 0%.    Pre procedure ASHLEY III flow was noted.  Post Procedure ASHLEY III flow was    present. Good runoff was present. The lesion was diagnosed as Moderate    Risk (B).      Devices used       - [employer] Flex 180 cm. Number of passes: 1.     [address] 3.5 x 8 OMAR. 1 inflation(s) to a max pressure of: 12    lb.      Coronary Tree      Dominance: Right         Conclusions:  1. Successful PTCA -OMAR mid RCA and mid LAD      Recommendations:  1. Medical Therapy. 2. Risk Factors Modification. 3. Post STENT Protocol      History and Risk Factors    [x] Hypertension     [] Family history of CAD  [x] Hyperlipidemia     [] Cerebrovascular Disease   [] Prior MI       [] Peripheral Vascular disease   [] Prior PCI              [] Diabetes Mellitus    [] Left Main PCI. [] Currently on Dialysis. [] Prior CABG. [] Currently smoker. [] Cardiac Arrest outside of healthcare facility. [] Yes    [] No        Witnessed     [] Yes   [] No     Arrest after arrival of EMS  [] Yes   [] No     [] Cardiac Arrest at other Facility. [] Yes   [] No    Pre-Procedure Information. Heart Failure       [x] Yes    [] No        Class  [] I      [] II  [] III    [] IV. New Diagnosis    [] Yes  [] No    HF Type      [] Systolic   [x] Diastolic          [] Unknown. Diagnostic Test:   EKG       [x] Normal   [] Abnormal    New antiarrhythmia medications:    [] Yes   [] No   New onset atrial fibrillation / Flutter     [] Yes   [] No   ECG Abnormalities:      [] V. Fib   [] Michelle V. Tach           [] NS V. T   [] New LBBB           [] T.  Inv  []  ST dev > 0.5 mm         [] PVC's freq  [] PVC's infrequent    Stress Test Performed:      [] Yes    [x] No     Type:     [] Stress Echo   [] Exercise Stress Test (no imaging)      [] Stress Nuclear  [] Stress Imaging     Results   [] Negative   [] Positive        [] Indeterminate  [] Unavailable     If Positive/ Risk / Extent of Ischemia:       [] Low  [] Intermediate         [] High  [] Unavailable      Cardiac CTA Performed:     [] Yes    [x] No      Results   [] CAD   [] Non obstructive CAD      [] No CAD   [] Uncertain      [] Unknown   [] Structural Disease. Pre Procedure Medications:   [x] Yes    [] No         [x] ASA  [x] Beta Blockers      [] Nitrate  [] Ca Channel Blockers      [] Ranolazine  [x] Statin       [] Plavix/Others antiplatelets          Sandro Ohara MD  Fellow, [address]            I have reviewed the case / procedure with resident / fellow  I have examined the patient personally  Patient agree with treatment plan, correction innotes was made as appropriate, and discussed final arrangement based on  my evaluation and exam.    Risk and benefit of procedure if planned were explained in details. Procedure was performed by me, with all aspect of the procedure being done using standard protocol. Note was modified based on my own assessment and treatment.     Aide Burger MD  [facility]

## 2019-03-28 NOTE — PROGRESS NOTES
PHARMACY NOTE:    Sreekanth Nobles was ordered fish oil. As per the [facility], herbals and certain dietary supplements will be discontinued.   The herbal or dietary supplement may be continued after discharge from the hospital.

## 2019-03-28 NOTE — PROGRESS NOTES
[facility] coming from [facility] at this time, ETA about 50-60 minutes, patient aware and so is his spouse

## 2019-03-28 NOTE — PROGRESS NOTES
Patient post PCI to [facility] for recovery while awaiting admit bed. Right wrist soft with TR band dry and intact. Right arm elevated up on bath blankets with pulse OX intact. Assessment obtained. Denies pain. Side rails up 2/2 with call light in reach. Taking fluids well and eating box lunch well. No visitors at bedside. Stent booklet and card in patient belonging bag.

## 2019-03-28 NOTE — OP NOTE
-  Coronary Angiography Brief Post Operative Note:    Moderate single vessel coronary artery disease involving a 50-60% stenosis in the distal portion of the proximal LAD which does appear to include the ostium of a good sized D1 branch of the LAD. 70% mid RCA stenosis. Normal left ventricular end diastolic pressure (LVEDP). Consult to interventional cardiology for consideration of angioplasty and/or stenting of the patients severe stenosis. I discussed these findings with the patient and all questions were answered. See report for further details.

## 2019-03-28 NOTE — PROGRESS NOTES
Report called to [facility]'s Cath lab and gave report to [facility] at this time, gave cath lab phone number to call with any questions

## 2019-03-29 LAB
ANION GAP SERPL CALCULATED.3IONS-SCNC: 12 MMOL/L (ref 9–17)
BUN BLDV-MCNC: 23 MG/DL (ref 8–23)
BUN/CREAT BLD: ABNORMAL (ref 9–20)
CALCIUM SERPL-MCNC: 9.1 MG/DL (ref 8.6–10.4)
CHLORIDE BLD-SCNC: 106 MMOL/L (ref 98–107)
CO2: 20 MMOL/L (ref 20–31)
CREAT SERPL-MCNC: 1.45 MG/DL (ref 0.7–1.2)
GFR AFRICAN AMERICAN: 56 ML/MIN
GFR NON-AFRICAN AMERICAN: 46 ML/MIN
GFR SERPL CREATININE-BSD FRML MDRD: ABNORMAL ML/MIN/{1.73_M2}
GFR SERPL CREATININE-BSD FRML MDRD: ABNORMAL ML/MIN/{1.73_M2}
GLUCOSE BLD-MCNC: 143 MG/DL (ref 70–99)
HCT VFR BLD CALC: 40 % (ref 40.7–50.3)
HEMOGLOBIN: 12.8 G/DL (ref 13–17)
POTASSIUM SERPL-SCNC: 4.7 MMOL/L (ref 3.7–5.3)
SODIUM BLD-SCNC: 138 MMOL/L (ref 135–144)

## 2019-03-29 PROCEDURE — 2580000003 HC RX 258: Performed by: STUDENT IN AN ORGANIZED HEALTH CARE EDUCATION/TRAINING PROGRAM

## 2019-03-29 PROCEDURE — 6370000000 HC RX 637 (ALT 250 FOR IP): Performed by: STUDENT IN AN ORGANIZED HEALTH CARE EDUCATION/TRAINING PROGRAM

## 2019-03-29 PROCEDURE — 51798 US URINE CAPACITY MEASURE: CPT

## 2019-03-29 PROCEDURE — 87086 URINE CULTURE/COLONY COUNT: CPT

## 2019-03-29 PROCEDURE — 85014 HEMATOCRIT: CPT

## 2019-03-29 PROCEDURE — G0378 HOSPITAL OBSERVATION PER HR: HCPCS

## 2019-03-29 PROCEDURE — 6360000002 HC RX W HCPCS: Performed by: STUDENT IN AN ORGANIZED HEALTH CARE EDUCATION/TRAINING PROGRAM

## 2019-03-29 PROCEDURE — 36415 COLL VENOUS BLD VENIPUNCTURE: CPT

## 2019-03-29 PROCEDURE — 0T9B80Z DRAINAGE OF BLADDER WITH DRAINAGE DEVICE, VIA NATURAL OR ARTIFICIAL OPENING ENDOSCOPIC: ICD-10-PCS | Performed by: UROLOGY

## 2019-03-29 PROCEDURE — 85018 HEMOGLOBIN: CPT

## 2019-03-29 PROCEDURE — 80048 BASIC METABOLIC PNL TOTAL CA: CPT

## 2019-03-29 RX ORDER — TAMSULOSIN HYDROCHLORIDE 0.4 MG/1
0.4 CAPSULE ORAL DAILY
Status: DISCONTINUED | OUTPATIENT
Start: 2019-03-29 | End: 2019-04-01 | Stop reason: HOSPADM

## 2019-03-29 RX ORDER — MAGNESIUM HYDROXIDE 1200 MG/15ML
3000 LIQUID ORAL CONTINUOUS
Status: DISCONTINUED | OUTPATIENT
Start: 2019-03-29 | End: 2019-04-01 | Stop reason: HOSPADM

## 2019-03-29 RX ORDER — CLOPIDOGREL BISULFATE 75 MG/1
75 TABLET ORAL DAILY
Qty: 30 TABLET | Refills: 3 | Status: SHIPPED | OUTPATIENT
Start: 2019-03-30 | End: 2019-07-03 | Stop reason: SDUPTHER

## 2019-03-29 RX ORDER — ATROPA BELLADONNA AND OPIUM 16.2; 6 MG/1; MG/1
60 SUPPOSITORY RECTAL EVERY 8 HOURS PRN
Status: DISCONTINUED | OUTPATIENT
Start: 2019-03-29 | End: 2019-04-01 | Stop reason: HOSPADM

## 2019-03-29 RX ADMIN — METOPROLOL TARTRATE 25 MG: 25 TABLET ORAL at 08:27

## 2019-03-29 RX ADMIN — METOPROLOL TARTRATE 25 MG: 25 TABLET ORAL at 03:47

## 2019-03-29 RX ADMIN — TAMSULOSIN HYDROCHLORIDE 0.4 MG: 0.4 CAPSULE ORAL at 08:27

## 2019-03-29 RX ADMIN — ASPIRIN 81 MG: 81 TABLET ORAL at 08:27

## 2019-03-29 RX ADMIN — SODIUM CHLORIDE 3000 ML: 900 IRRIGANT IRRIGATION at 17:20

## 2019-03-29 RX ADMIN — SODIUM CHLORIDE 3000 ML: 900 IRRIGANT IRRIGATION at 22:25

## 2019-03-29 RX ADMIN — FERROUS SULFATE TAB EC 325 MG (65 MG FE EQUIVALENT) 325 MG: 325 (65 FE) TABLET DELAYED RESPONSE at 08:27

## 2019-03-29 RX ADMIN — ISOSORBIDE MONONITRATE 30 MG: 30 TABLET ORAL at 03:48

## 2019-03-29 RX ADMIN — SODIUM CHLORIDE 3000 ML: 900 IRRIGANT IRRIGATION at 05:21

## 2019-03-29 RX ADMIN — Medication 1 G: at 03:43

## 2019-03-29 RX ADMIN — ISOSORBIDE MONONITRATE 30 MG: 30 TABLET ORAL at 08:27

## 2019-03-29 RX ADMIN — ATORVASTATIN CALCIUM 20 MG: 20 TABLET, FILM COATED ORAL at 08:27

## 2019-03-29 RX ADMIN — SODIUM CHLORIDE 3000 ML: 900 IRRIGANT IRRIGATION at 20:36

## 2019-03-29 RX ADMIN — ACETAMINOPHEN 650 MG: 325 TABLET ORAL at 11:29

## 2019-03-29 RX ADMIN — SODIUM CHLORIDE 3000 ML: 900 IRRIGANT IRRIGATION at 19:14

## 2019-03-29 RX ADMIN — SODIUM CHLORIDE 3000 ML: 900 IRRIGANT IRRIGATION at 09:00

## 2019-03-29 RX ADMIN — METOPROLOL TARTRATE 25 MG: 25 TABLET ORAL at 20:35

## 2019-03-29 RX ADMIN — SODIUM CHLORIDE 3000 ML: 900 IRRIGANT IRRIGATION at 02:00

## 2019-03-29 RX ADMIN — Medication 1 G: at 17:50

## 2019-03-29 RX ADMIN — ATORVASTATIN CALCIUM 20 MG: 20 TABLET, FILM COATED ORAL at 03:48

## 2019-03-29 RX ADMIN — SODIUM CHLORIDE 3000 ML: 900 IRRIGANT IRRIGATION at 04:00

## 2019-03-29 RX ADMIN — AMLODIPINE BESYLATE 5 MG: 5 TABLET ORAL at 03:42

## 2019-03-29 RX ADMIN — Medication 1 G: at 11:21

## 2019-03-29 RX ADMIN — SODIUM CHLORIDE: 9 INJECTION, SOLUTION INTRAVENOUS at 05:23

## 2019-03-29 RX ADMIN — SODIUM CHLORIDE 3000 ML: 900 IRRIGANT IRRIGATION at 12:37

## 2019-03-29 RX ADMIN — SODIUM CHLORIDE: 9 INJECTION, SOLUTION INTRAVENOUS at 19:20

## 2019-03-29 RX ADMIN — CLOPIDOGREL 75 MG: 75 TABLET, FILM COATED ORAL at 08:27

## 2019-03-29 RX ADMIN — AMLODIPINE BESYLATE 5 MG: 5 TABLET ORAL at 08:27

## 2019-03-29 RX ADMIN — ATROPA BELLADONNA AND OPIUM 60 MG: 16.2; 6 SUPPOSITORY RECTAL at 03:59

## 2019-03-29 ASSESSMENT — PAIN SCALES - GENERAL
PAINLEVEL_OUTOF10: 3
PAINLEVEL_OUTOF10: 0

## 2019-03-29 NOTE — CONSULTS
 Hypertension     Renal stones     Trigger finger     Vertigo     Wrist fracture, left        Past Surgical History:    Past Surgical History:   Procedure Laterality Date    APPENDECTOMY      20 yo    CARDIAC CATHETERIZATION Left 03/28/2019    Right [facility]/ : OMAR to mid RCA and mid LAD [facility]    plate and screws s/p trauma  and taken out in [address] [place] [address]  9/12/2012    LITHOTRIPSY      PROSTATE BIOPSY  10/22/1998, 8/26/2004    TOTAL HIP ARTHROPLASTY Right 1999       Medications:      Current Facility-Administered Medications:     opium-belladonna (B&O SUPPRETTES) 16.2-60 MG suppository 60 mg, 60 mg, Rectal, Q8H PRN, Claude Page MD    0.9 % sodium chloride infusion, , Intravenous, Continuous, Earl Lizama MD, Last Rate: 75 mL/hr at 03/28/19 2247    aspirin EC tablet 81 mg, 81 mg, Oral, Daily, Earl Lizama MD    ferrous sulfate EC tablet 325 mg, 325 mg, Oral, Daily with breakfast, Earl Lizama MD    metoprolol tartrate (LOPRESSOR) tablet 25 mg, 25 mg, Oral, BID, Earl Lizama MD    nitroGLYCERIN (NITROSTAT) SL tablet 0.4 mg, 0.4 mg, Sublingual, Q5 Min PRN, Earl Lizama MD    atorvastatin (LIPITOR) tablet 20 mg, 20 mg, Oral, Daily, Dolores Gonzalez MD    amLODIPine (NORVASC) tablet 5 mg, 5 mg, Oral, Daily, Earl Lizama MD    isosorbide mononitrate (IMDUR) extended release tablet 30 mg, 30 mg, Oral, Daily, Dolores Gonzalez MD    sodium chloride flush 0.9 % injection 10 mL, 10 mL, Intravenous, 2 times per day, Earl Lizama MD, 10 mL at 03/28/19 2247    sodium chloride flush 0.9 % injection 10 mL, 10 mL, Intravenous, PRN, Earl Lizama MD    acetaminophen (TYLENOL) tablet 650 mg, 650 mg, Oral, Q4H PRN, Earl Lizama MD    magnesium hydroxide (MILK OF MAGNESIA) 400 MG/5ML suspension 30 mL, 30 mL, Oral, Daily PRN, Earl Lizama MD    clopidogrel (PLAVIX) tablet 75 mg, 75 mg, Oral, Daily, Dory Norris MD    lidocaine (XYLOCAINE) 2% uro-jet, , Topical, PRN, Maribel Zavaleta MD    Allergies: Allergies   Allergen Reactions    Penicillins Swelling and Rash       Social History:   Social History     Socioeconomic History    Marital status:      Spouse name: Not on file    Number of children: Not on file    Years of education: Not on file    Highest education level: Not on file   Occupational History    Not on file   Social Needs    Financial resource strain: Not on file    Food insecurity:     Worry: Not on file     Inability: Not on file    Transportation needs:     Medical: Not on file     Non-medical: Not on file   Tobacco Use    Smoking status: Former Smoker     Packs/day: 1.00     Years: 25.00     Pack years: 25.00     Last attempt to quit: 1974     Years since quittin.2    Smokeless tobacco: Never Used   Substance and Sexual Activity    Alcohol use: No     Comment: quit 38 years ago.     Drug use: No    Sexual activity: Yes     Partners: Female   Lifestyle    Physical activity:     Days per week: Not on file     Minutes per session: Not on file    Stress: Not on file   Relationships    Social connections:     Talks on phone: Not on file     Gets together: Not on file     Attends Jehovah's witness service: Not on file     Active member of club or organization: Not on file     Attends meetings of clubs or organizations: Not on file     Relationship status: Not on file    Intimate partner violence:     Fear of current or ex partner: Not on file     Emotionally abused: Not on file     Physically abused: Not on file     Forced sexual activity: Not on file   Other Topics Concern    Not on file   Social History Narrative    Not on file       Family History:    Family History   Problem Relation Age of Onset    Heart Disease Mother     Hypertension Mother     Heart Attack Mother     Parkinsonism Father     Heart Attack Father     Heart Disease Father     Kidney Disease Brother         dialysis    Arrhythmia Sister     No Known Problems Brother        REVIEW OF SYSTEMS:  A comprehensive 14 point review of systems was obtained. Constitutional: No fatigue  Eyes: No blurry vision  Ears, nose, mouth, throat, face: No ringing in the ears; no facial droop. Respiratory: No cough or cold. Cardiovascular: No palpitations. Recent shortness of breath. Gastrointestinal: No diarrhea or constipation. Genitourinary: No burning with urination  Integument/Skin: No rashes  Hematologic/Lymphatic: No easy bruising  Musculoskeletal: No muscle pains  Neurologic: No weakness in the extremities. Psychiatric: No depression or suicidal thoughts. Endocrine: No heat or cold intolerances. Allergic/Immunologic: No current seasonal allergies; no skin hives. Physical Exam:    This a 80 y.o. male   Vitals:    03/29/19 0000   BP: (!) 173/71   Pulse: 76   Resp:    SpO2: 96%     Constitutional: Patient in no acute distress. Neuro: alert and oriented to person place and time. Head: Atraumatic and normocephalic. Neck: Trachea midline   Ext: 2+ radial pulses bilaterally. Psych: Mood and affect normal.  Skin: No rashes or bruising present. Lungs: Respiratory effort normal.  Cardiovascular:  Regular rhythm. Abdomen: Soft, SP tender, non-distended. Neither side has CVA tenderness on exam.  Bladder tender and distended. Lymphatics: no palpable lymphadenopathy. Penis normal and uncircumcised  Urethral meatus normal  Scrotal exam normal  Testicles normal bilaterally  Epididymis normal bilaterally  No evidence of inguinal hernia  Anus and perineum normal  Normal rectal tone with no masses  Prostate soft, non-tender to palpation. No palpable nodules. Estimated 60 grams. Labs:  No results for input(s): WBC, HGB, HCT, MCV, PLT in the last 72 hours. No results for input(s): NA, K, CL, CO2, PHOS, BUN, CREATININE in the last 72 hours.     Invalid input(s): CA    No results for input(s): COLORU, PHUR, LABCAST, WBCUA, RBCUA, MUCUS, TRICHOMONAS, YEAST, BACTERIA, CLARITYU, SPECGRAV, LEUKOCYTESUR, UROBILINOGEN, [place] In[place] in the last 72 hours. Invalid input(s): NITRATE, GLUCOSEUKETONESUAMORPHOUS    Culture Results:  [facility]      -----------------------------------------------------------------  Imaging Results:  CT abdomen and pelvis without contrast on July 27, 2018 does show an enlarged prostate as well as left parapelvic cysts and small nephrolithiasis. Kidneys are without hydronephrosis. Assessment and Plan   Impression:    Gross hematuria, likely secondary to Barton trauma but possibly due to enlarged prostate or other pathology  Urinary retention and thousand cc  Difficult Barton catheterization and Barton trauma  Coronary artery disease status post catheterization and stent placements. Running kidney disease, baseline creatinine 1.4-1.5    Plan:   Flomax  Avoid narcotics and anticholinergics  Ambulate, PT/OT  Avoid Constipation as this can lead to retention. Bowel regimen   Maintain Indwelling Urinary catheter for at least 5-7 days to allow for healing of this passage  B&O suppositories for bladder spasms  hand irrigate when necessary continuous bladder irrigation was started. Continue to monitor for gross hematuria. Follow-up morning labs including hemoglobin. Ancef for Ppx  Urine Culture and Urinalysis  Will consider upper tract imaging workup of gross hematuria and cystoscopy as outpatient.     Maranda Frankel  1:34 AM 3/29/2019

## 2019-03-29 NOTE — CARE COORDINATION
Case Management Initial Discharge Plan  Parth Marquez,             Met with:patient to discuss discharge plans. Information verified: address, contacts, phone number, , insurance Yes  PCP: GINETTE Otero CNP  Date of last visit: month ago     Insurance Provider: Medicare     Discharge Planning    Living Arrangements:  Spouse/Significant Other   Support Systems:  Spouse/Significant Other    Home has one  stories  Few  stairs to climb to get into front door, no stairs to climb to reach second floor  Location of bedroom/bathroom in home , main     Patient able to perform ADL's:Independent    Current Services (outpatient & in home) na  DME equipment: na  DME provider: [facility]    Pharmacy: [facility]    Potential Assistance Purchasing Medications:  No  Does patient want to participate in local refill/ meds to beds program?  No    Potential Assistance Needed:  N/A    Patient agreeable to home care: No  [place] of choice provided:  n/a    Prior SNF/Rehab Placement and Facility: na  Agreeable to SNF/Rehab: No  [place] of choice provided: n/a   Evaluation: no    Expected Discharge date:  19  Patient expects to be discharged to:  home  Follow Up Appointment: Best Day/ Time:      Transportation provider: family   Transportation arrangements needed for discharge: No    Readmission Risk              Risk of Unplanned Readmission:        8             Does patient have a readmission risk score greater than 14?: No  If yes, follow-up appointment must be made within 7 days of discharge.      Discharge Plan: return home with wife           Electronically signed by Maricel Boston RN on 3/29/19 at 12:16 PM

## 2019-03-29 NOTE — DISCHARGE SUMMARY
[facility]  Discharge Note                 Name:  Jyoti Reid  YOB: 1935  Social Security Number:  [id number]  Medical Record Number:  [id number]    Date of Admission:  3/28/2019  Date of Discharge:  3/29/2019    Admitting physician: Dahlia Amaro MD    Discharge Attending: Sandra Alfaro CNP  Primary Care Physician: GINETTE Waldron CNP  Consultants: Cardiology  Discharge to Home in stable condition    HOSPITAL ADMISSION PROBLEM LIST:  Patient Active Problem List   Diagnosis    Swelling of right testicle    Abnormal stress test    S/P angioplasty with stent         Procedures:cardiac catheterization    HOSPITAL COURSE :           The patient was admitted for: Tx from [facility] for cardiac intervention  Hospital Procedures if any: Cath with stent placement  Medications changes recommendation: See List  Follow Up Plan: F/U Dr. Martine Almonte in 1-2 weeks      Discharge exam:   Vitals:    03/29/19 1100   BP: (!) 107/45   Pulse: 64   Resp: 16   Temp: 97.5 °F (36.4 °C)   SpO2:      Neuro: normal  Chest: Clear to ausculation. No wheezing. Cardiac: Regular rate. s1 and s2 auscultated. No murmur noted. Abdomen/groin: soft, non-tender, without masses or organomegaly  Lower extremity edema: none     Follow up with primary care provider 1 week  Follow up with cardiology 4 weeks  Follow up with other consultant physicians at their directions. Discharge Medications:   [facility] Medication Instructions WLF:[id number]    Printed on:03/29/19 3647   Medication Information                      amLODIPine (NORVASC) 5 MG tablet  Take 1 tablet by mouth daily             aspirin 81 MG EC tablet  Take 81 mg by mouth daily.                atorvastatin (LIPITOR) 20 MG tablet  Take 1 tablet by mouth daily             clopidogrel (PLAVIX) 75 MG tablet  Take 1 tablet by mouth daily             ferrous sulfate 325 (65 Fe) MG tablet  Take 325 mg by mouth daily (with breakfast) isosorbide mononitrate (IMDUR) 30 MG extended release tablet  Take 1 tablet by mouth daily             metoprolol tartrate (LOPRESSOR) 25 MG tablet  Take 1 tablet by mouth 2 times daily             nitroGLYCERIN (NITROSTAT) 0.4 MG SL tablet  Place 1 tablet under the tongue every 5 minutes as needed for Chest pain             Omega-3 Fatty Acids (FISH OIL) 1000 MG CAPS  Take 3,000 mg by mouth daily                 Moderate single vessel coronary artery disease involving a 50-60% stenosis in the distal portion of the proximal LAD which does appear to include the ostium of a good sized D1 branch of the LAD. 70% mid RCA stenosis. Normal left ventricular end diastolic pressure (LVEDP). Consult to interventional cardiology for consideration of angioplasty and/or stenting of the patients severe stenosis. I discussed these findings with the patient and all questions were answered. See report for further details. Cardiac cath 3/28/19  Findings:     LAD:       Lesion on Mid LAD: Mid subsection. 75% stenosis 5 mm length reduced to 0%.    Pre procedure ASHLEY III flow was noted. Post Procedure ASHLEY III flow was    present. Good runoff was present. The lesion was diagnosed as High Risk    (C).      Devices used       - ASAHI Prowater Flex 180 cm. Number of passes: 1.       - Xience Alycia 3.25 x 8 OMAR. 1 inflation(s) to a max pressure of: 12    lb.     RCA:       Lesion on Mid RCA: Mid subsection. 70% stenosis 4 mm length reduced to 0%.    Pre procedure ASHLEY III flow was noted. Post Procedure ASHLEY III flow was    present. Good runoff was present. The lesion was diagnosed as Moderate    Risk (B).      Devices used       - ASAHI P[place]ater Flex 180 cm. Number of passes: 1.     [address] 3.5 x 8 OMAR. 1 inflation(s) to a max pressure of: 12    lb.      Coronary Tree      Dominance: Right           Conclusions:  1. Successful PTCA -OMAR mid RCA and mid LAD        Recommendations:  1. Medical Therapy.   2. Risk Factors Modification. 3. Post STENT Protocol           Coronary Discharge Core Measure: Please indicate the medication given by X, and if not the reasons not given:    Not Given Reason  Given      Beta Blockers x      ACE-I No d/t hx of CKD      Statins x      ASA x       OAP (Plavix/Effient/Brilinta) Plavix    SL Nitro   x           Discussed with patient and nursing. Medications and discharge instructions reviewed with patient and nursing. Discussed in detail with patient post cath POC including but not limited to medications, diet, exercise, right radial artery site care, and follow-up. Questions and concerns addressed. OK for discharge home today. F/U in Dr. Jose Vaca office in as scheduled next week.       Electronically signed by Severo Govern, APRN - CNP on 3/29/2019 at [address] [facility]      [phone]

## 2019-03-29 NOTE — PLAN OF CARE
Problem: Pain:  Goal: Control of chronic pain  Description  Control of chronic pain  3/29/2019 0817 by Abigail Carey RN  Outcome: Ongoing  3/29/2019 0435 by Elda Carrasco RN  Outcome: Ongoing

## 2019-03-29 NOTE — PROGRESS NOTES
Dr. Lonnie Styles notified of discharge order for patient and stated he prefers to keep patient overnight to monitor urine output and stated he may discharge patient tomorrow.

## 2019-03-29 NOTE — PROGRESS NOTES
Patient had been unable to urinate and complaining of abdominal pain. Dr. Ivana blair served. Gupta catheter was attempted to be placed without urine return. Balloon was not inflated and catheter removed. Patient passed multiple large blood clots which did give him some relief. Second attempt to place indwelling gupta with balloon  Inflated with jorge blood noted. Urology contacted. Writer spoke with Dr Rachel Saint who requested gupta be removed and  cart taken to patients room. Patient transported to [address] [place] [address] and bedside report given to [facility].

## 2019-03-29 NOTE — PLAN OF CARE
Patient remained free of falls during shift. 2/4 side rails up. Bed in lowest position. Bed breaks locked. Call light and bedside table within reach. Continue to monitor.

## 2019-03-29 NOTE — PROGRESS NOTES
Air removed from TR band in  2 mL increments until all air removed. No bleeding or hematoma noted. Pressure dressing, radial pulse palpable.

## 2019-03-29 NOTE — PROGRESS NOTES
Patient arrived to room 3003 from [facility]. Right radial soft, no bleeding, with pressure dressing in place. Pt has no gupta upon arrival as orders were given to remove gupta prior to discharge, but is complaining of 10/10 pain in lower abdomen with burning sensation in penis. Pt stated that he feels like he has to urinate, but is unable to do so as he only passes clots. Paged Urology and updated Dr. Shwetha Lima on call on above. New orders received.     Electronically signed by Jordyn Zabala RN on 3/28/2019 at 10:17 PM

## 2019-03-29 NOTE — OP NOTE
Dr. Marky Valdez. [facility]. Shahid  03/29/19    DATE:  03/29/19    SURGEON:   Dr Stefania Ceballos    ASSISTANT:  Merle Hodgkins, MD.      PREOPERATIVE DIAGNOSIS:  Barton catheterization  Urinary retention    POSTOPERATIVE DIAGNOSIS:  Barton catheterization  Urinary retention  False urethral passage    PROCEDURE PERFORMED:  Cystoscopy  Placement of Barton catheter    ANESTHESIA:  Local    COMPLICATIONS:  None. ESTIMATED BLOOD LOSS:  30cc    DRAINS:  25 Fr 3 way catheter with 20cc of water in the balloon    SPECIMENS:  none    INDICATIONS FOR PROCEDURE:  The patient is a 80 y.o. male with a history of her catheterization on March 28. The patient had not had any urine output cannot void therefore attempts were made to place Barton catheter. These were unsuccessful and in fact he was having gross hematuria. The urology service did attempt to place a 16 & 18-[place] coudé catheters as well as 22 and 24-[place] 3-way catheter as this was unsuccessful therefore we plan for cystoscopy. . The risks and benefits of the procedure as well as possible alternatives and complications were discussed and he consented    DETAILS OF THE PROCEDURE:  The patient was correctly identified his bedside. Was prepped and draped in standard sterile surgical fashion. The cystoscope was inserted into the urethra and there was noted to be blood immediately. With irrigation visualization was still poor however there was noted to be a false passage in the bulbar urethra eventually I was able to advance and find the true lumen and cannulate the bladder inserting a 035 guidewire. Over the 035 guidewire I removed the scope and inserted a 22-[place] through a we made and a [place] tip fashion into the bladder. 20 Cc of water was inserted into the balloon. There is return of blood-tinged urine. This was connected to a bag and drain 1000 mL of red urine.  He was then hand irrigated with 300 mL of sterile saline for return of 5 mL of small clot. The patient did have some relief he overall tolerated the procedure well. DISPOSITION:  The patient made on the floor in stable condition and we will continue to monitor his gross hematuria.

## 2019-03-30 LAB
CULTURE: NO GROWTH
Lab: NORMAL
SPECIMEN DESCRIPTION: NORMAL

## 2019-03-30 PROCEDURE — 2580000003 HC RX 258: Performed by: STUDENT IN AN ORGANIZED HEALTH CARE EDUCATION/TRAINING PROGRAM

## 2019-03-30 PROCEDURE — 94762 N-INVAS EAR/PLS OXIMTRY CONT: CPT

## 2019-03-30 PROCEDURE — 6370000000 HC RX 637 (ALT 250 FOR IP): Performed by: STUDENT IN AN ORGANIZED HEALTH CARE EDUCATION/TRAINING PROGRAM

## 2019-03-30 PROCEDURE — 2060000000 HC ICU INTERMEDIATE R&B

## 2019-03-30 PROCEDURE — 6370000000 HC RX 637 (ALT 250 FOR IP): Performed by: INTERNAL MEDICINE

## 2019-03-30 RX ORDER — ATORVASTATIN CALCIUM 80 MG/1
80 TABLET, FILM COATED ORAL DAILY
Status: DISCONTINUED | OUTPATIENT
Start: 2019-03-30 | End: 2019-04-01 | Stop reason: HOSPADM

## 2019-03-30 RX ORDER — OXYBUTYNIN CHLORIDE 10 MG/1
10 TABLET, EXTENDED RELEASE ORAL ONCE
Status: COMPLETED | OUTPATIENT
Start: 2019-03-30 | End: 2019-03-30

## 2019-03-30 RX ORDER — ATORVASTATIN CALCIUM 20 MG/1
80 TABLET, FILM COATED ORAL DAILY
Qty: 90 TABLET | Refills: 3 | Status: SHIPPED | OUTPATIENT
Start: 2019-03-30 | End: 2019-07-03 | Stop reason: DRUGHIGH

## 2019-03-30 RX ORDER — UREA 10 %
3 LOTION (ML) TOPICAL ONCE
Status: COMPLETED | OUTPATIENT
Start: 2019-03-30 | End: 2019-03-30

## 2019-03-30 RX ADMIN — SODIUM CHLORIDE 3000 ML: 900 IRRIGANT IRRIGATION at 19:38

## 2019-03-30 RX ADMIN — SODIUM CHLORIDE 3000 ML: 900 IRRIGANT IRRIGATION at 12:15

## 2019-03-30 RX ADMIN — SODIUM CHLORIDE 3000 ML: 900 IRRIGANT IRRIGATION at 06:40

## 2019-03-30 RX ADMIN — SODIUM CHLORIDE 3000 ML: 900 IRRIGANT IRRIGATION at 20:40

## 2019-03-30 RX ADMIN — Medication 3 MG: at 22:40

## 2019-03-30 RX ADMIN — SODIUM CHLORIDE 3000 ML: 900 IRRIGANT IRRIGATION at 08:32

## 2019-03-30 RX ADMIN — ATORVASTATIN CALCIUM 80 MG: 80 TABLET, FILM COATED ORAL at 09:40

## 2019-03-30 RX ADMIN — SODIUM CHLORIDE 3000 ML: 900 IRRIGANT IRRIGATION at 15:28

## 2019-03-30 RX ADMIN — SODIUM CHLORIDE 3000 ML: 900 IRRIGANT IRRIGATION at 13:21

## 2019-03-30 RX ADMIN — SODIUM CHLORIDE 3000 ML: 900 IRRIGANT IRRIGATION at 11:24

## 2019-03-30 RX ADMIN — AMLODIPINE BESYLATE 5 MG: 5 TABLET ORAL at 08:11

## 2019-03-30 RX ADMIN — OXYBUTYNIN CHLORIDE 10 MG: 10 TABLET, EXTENDED RELEASE ORAL at 22:40

## 2019-03-30 RX ADMIN — SODIUM CHLORIDE: 9 INJECTION, SOLUTION INTRAVENOUS at 19:38

## 2019-03-30 RX ADMIN — SODIUM CHLORIDE 3000 ML: 900 IRRIGANT IRRIGATION at 03:38

## 2019-03-30 RX ADMIN — TAMSULOSIN HYDROCHLORIDE 0.4 MG: 0.4 CAPSULE ORAL at 08:11

## 2019-03-30 RX ADMIN — ISOSORBIDE MONONITRATE 30 MG: 30 TABLET ORAL at 08:13

## 2019-03-30 RX ADMIN — SODIUM CHLORIDE 3000 ML: 900 IRRIGANT IRRIGATION at 16:53

## 2019-03-30 RX ADMIN — SODIUM CHLORIDE 3000 ML: 900 IRRIGANT IRRIGATION at 01:02

## 2019-03-30 RX ADMIN — SODIUM CHLORIDE 3000 ML: 900 IRRIGANT IRRIGATION at 14:29

## 2019-03-30 RX ADMIN — ATROPA BELLADONNA AND OPIUM 60 MG: 16.2; 6 SUPPOSITORY RECTAL at 18:05

## 2019-03-30 RX ADMIN — SODIUM CHLORIDE: 9 INJECTION, SOLUTION INTRAVENOUS at 06:43

## 2019-03-30 RX ADMIN — METOPROLOL TARTRATE 25 MG: 25 TABLET ORAL at 21:13

## 2019-03-30 RX ADMIN — METOPROLOL TARTRATE 25 MG: 25 TABLET ORAL at 08:20

## 2019-03-30 RX ADMIN — SODIUM CHLORIDE 3000 ML: 900 IRRIGANT IRRIGATION at 23:16

## 2019-03-30 RX ADMIN — SODIUM CHLORIDE 3000 ML: 900 IRRIGANT IRRIGATION at 21:31

## 2019-03-30 RX ADMIN — SODIUM CHLORIDE 3000 ML: 900 IRRIGANT IRRIGATION at 23:49

## 2019-03-30 RX ADMIN — SODIUM CHLORIDE 3000 ML: 900 IRRIGANT IRRIGATION at 17:48

## 2019-03-30 RX ADMIN — SODIUM CHLORIDE 3000 ML: 900 IRRIGANT IRRIGATION at 10:48

## 2019-03-30 RX ADMIN — CLOPIDOGREL 75 MG: 75 TABLET, FILM COATED ORAL at 08:12

## 2019-03-30 RX ADMIN — SODIUM CHLORIDE 3000 ML: 900 IRRIGANT IRRIGATION at 18:35

## 2019-03-30 RX ADMIN — LIDOCAINE HYDROCHLORIDE: 20 JELLY TOPICAL at 21:13

## 2019-03-30 RX ADMIN — SODIUM CHLORIDE 3000 ML: 900 IRRIGANT IRRIGATION at 14:05

## 2019-03-30 RX ADMIN — ATROPA BELLADONNA AND OPIUM 60 MG: 16.2; 6 SUPPOSITORY RECTAL at 10:01

## 2019-03-30 RX ADMIN — FERROUS SULFATE TAB EC 325 MG (65 MG FE EQUIVALENT) 325 MG: 325 (65 FE) TABLET DELAYED RESPONSE at 08:12

## 2019-03-30 RX ADMIN — ASPIRIN 81 MG: 81 TABLET ORAL at 08:12

## 2019-03-30 ASSESSMENT — PAIN SCALES - GENERAL
PAINLEVEL_OUTOF10: 0
PAINLEVEL_OUTOF10: 0
PAINLEVEL_OUTOF10: 5
PAINLEVEL_OUTOF10: 0
PAINLEVEL_OUTOF10: 0

## 2019-03-30 ASSESSMENT — PAIN DESCRIPTION - LOCATION: LOCATION: GROIN

## 2019-03-30 NOTE — PROGRESS NOTES
Char Gandara MD, Miky Geiger MD, Soy Schultz MD, Erum Heredia MD, Epi Gregory MD, Nevin Finn MD, & Chris Wood MD    Urology - Progress Note      Subjective: No acute events overnight. Patient turned on full CBI for clot passage, but not hand irrigated. Denies F/C/CP/SOA/N/V. Patient Vitals for the past 24 hrs:   BP Temp Temp src Pulse Resp SpO2 Height Weight   03/30/19 0958 -- -- -- 81 20 93 % -- --   03/30/19 0720 (!) 154/72 98.9 °F (37.2 °C) Oral 71 20 95 % -- --   03/30/19 0630 -- -- -- -- -- -- -- 188 lb 9.6 oz (85.5 kg)   03/30/19 0600 (!) 161/63 -- -- 70 -- 95 % -- --   03/30/19 0445 -- 99.1 °F (37.3 °C) Oral 83 18 -- -- --   03/29/19 2245 -- 99 °F (37.2 °C) Oral -- -- -- -- --   03/29/19 2000 (!) 137/55 98.8 °F (37.1 °C) Oral 74 18 94 % -- --   03/29/19 1700 (!) 142/62 -- -- 72 -- 97 % -- --   03/29/19 1600 (!) 117/47 98.2 °F (36.8 °C) Oral 68 16 95 % -- --   03/29/19 1525 -- -- -- -- -- -- 5' 8.11\" (1.73 m) --   03/29/19 1500 (!) 123/48 -- -- 67 -- 95 % -- --   03/29/19 1430 (!) 122/46 -- -- 67 -- 95 % -- --   03/29/19 1400 (!) 97/48 -- -- 61 -- 96 % -- --   03/29/19 1300 (!) 122/49 -- -- 61 -- 95 % -- --   03/29/19 1200 (!) 115/42 -- -- 67 -- 94 % -- --   03/29/19 1100 (!) 107/45 97.5 °F (36.4 °C) Oral 64 16 -- -- --   03/29/19 1030 108/82 -- -- 73 -- 95 % -- --       Intake/Output Summary (Last 24 hours) at 3/30/2019 1021  Last data filed at 3/30/2019 0643  Gross per 24 hour   Intake 2336.76 ml   Output -275 ml   Net 2611.76 ml       Recent Labs     03/29/19  0538   HGB 12.8*   HCT 40.0*     Recent Labs     03/29/19  0538      K 4.7      CO2 20   BUN 23   CREATININE 1.45*       No results for input(s): COLORU, PHUR, LABCAST, WBCUA, RBCUA, MUCUS, TRICHOMONAS, YEAST, BACTERIA, CLARITYU, SPECGRAV, LEUKOCYTESUR, UROBILINOGEN, BILIRUBINUR, BLOODU in the last 72 hours.     Invalid input(s): NITRATE, GLUCOSEUKETONESUAMORPHOUS    Additional Lab/culture results: None Physical Exam:   NAD, AOx3  NLB, equal chest rise B/L  RRR, 2+ radial pulses  ABD S/ND, mild suprapubic tenderness  Gupta draining pink urine with dried blood on penis/around meatus, on medium CBI  Warm extremities, no calf tenderness    Interval Imaging Findings: None     Assessment:  Felicitas Min is a 80 y.o. male who presents with:   Gross hematuria, likely secondary to Gupta trauma but possibly due to enlarged prostate or other pathology  Urinary retention 1000 cc  Difficult Gupta catheterization and Gupta trauma  Coronary artery disease status post catheterization and stent placements. Chronic kidney disease, baseline creatinine 1.4-1.5     Plan:   Hand irrigated for 10 cc clot, difficult due to bladder spasms, gupta balloon inflated with 20 more cc sterile water for total 40 cc in balloon  Continue CBI, wean as able, hand irrigate for obstruction/clots prior to restarting CBI  Hematuria workup as outpatient   Continue Flomax  B&O suppositories prn - hold prior to voiding trial   Ambulate, PT/OT  Bowel regimen  Maintain catheter for at least 1-2 weeks     Radha Crump MD  PGY-2, [address] [place] [address] Department of Urology   10:21 AM 3/30/2019      Attending Physician Statement  I have discussed the care of the patient, including pertinent history and exam findings, with the resident. I have seen and examined the patient and the key elements of all parts of the encounter have been performed by me. I have reviewed all laboratory findings and imaging reports/films. I agree with the assessment, plan and orders as documented by the resident.   (GC Modifier)

## 2019-03-31 PROCEDURE — 6370000000 HC RX 637 (ALT 250 FOR IP): Performed by: STUDENT IN AN ORGANIZED HEALTH CARE EDUCATION/TRAINING PROGRAM

## 2019-03-31 PROCEDURE — 2580000003 HC RX 258: Performed by: STUDENT IN AN ORGANIZED HEALTH CARE EDUCATION/TRAINING PROGRAM

## 2019-03-31 PROCEDURE — 2060000000 HC ICU INTERMEDIATE R&B

## 2019-03-31 PROCEDURE — 94762 N-INVAS EAR/PLS OXIMTRY CONT: CPT

## 2019-03-31 RX ORDER — UREA 10 %
3 LOTION (ML) TOPICAL NIGHTLY PRN
Status: DISCONTINUED | OUTPATIENT
Start: 2019-03-31 | End: 2019-04-01 | Stop reason: HOSPADM

## 2019-03-31 RX ADMIN — SODIUM CHLORIDE 3000 ML: 900 IRRIGANT IRRIGATION at 00:28

## 2019-03-31 RX ADMIN — SODIUM CHLORIDE 3000 ML: 900 IRRIGANT IRRIGATION at 01:07

## 2019-03-31 RX ADMIN — ASPIRIN 81 MG: 81 TABLET ORAL at 08:17

## 2019-03-31 RX ADMIN — SODIUM CHLORIDE 3000 ML: 900 IRRIGANT IRRIGATION at 01:45

## 2019-03-31 RX ADMIN — METOPROLOL TARTRATE 25 MG: 25 TABLET ORAL at 20:37

## 2019-03-31 RX ADMIN — SODIUM CHLORIDE 3000 ML: 900 IRRIGANT IRRIGATION at 17:49

## 2019-03-31 RX ADMIN — SODIUM CHLORIDE 3000 ML: 900 IRRIGANT IRRIGATION at 11:35

## 2019-03-31 RX ADMIN — SODIUM CHLORIDE 3000 ML: 900 IRRIGANT IRRIGATION at 15:32

## 2019-03-31 RX ADMIN — METOPROLOL TARTRATE 25 MG: 25 TABLET ORAL at 08:41

## 2019-03-31 RX ADMIN — SODIUM CHLORIDE 3000 ML: 900 IRRIGANT IRRIGATION at 14:16

## 2019-03-31 RX ADMIN — SODIUM CHLORIDE 3000 ML: 900 IRRIGANT IRRIGATION at 02:24

## 2019-03-31 RX ADMIN — CLOPIDOGREL 75 MG: 75 TABLET, FILM COATED ORAL at 08:17

## 2019-03-31 RX ADMIN — SODIUM CHLORIDE 3000 ML: 900 IRRIGANT IRRIGATION at 07:03

## 2019-03-31 RX ADMIN — TAMSULOSIN HYDROCHLORIDE 0.4 MG: 0.4 CAPSULE ORAL at 08:17

## 2019-03-31 RX ADMIN — SODIUM CHLORIDE 3000 ML: 900 IRRIGANT IRRIGATION at 04:21

## 2019-03-31 RX ADMIN — SODIUM CHLORIDE 3000 ML: 900 IRRIGANT IRRIGATION at 17:14

## 2019-03-31 RX ADMIN — SODIUM CHLORIDE 3000 ML: 900 IRRIGANT IRRIGATION at 23:11

## 2019-03-31 RX ADMIN — SODIUM CHLORIDE 3000 ML: 900 IRRIGANT IRRIGATION at 19:52

## 2019-03-31 RX ADMIN — SODIUM CHLORIDE 3000 ML: 900 IRRIGANT IRRIGATION at 06:21

## 2019-03-31 RX ADMIN — SODIUM CHLORIDE 3000 ML: 900 IRRIGANT IRRIGATION at 03:13

## 2019-03-31 RX ADMIN — AMLODIPINE BESYLATE 5 MG: 5 TABLET ORAL at 08:42

## 2019-03-31 RX ADMIN — SODIUM CHLORIDE 3000 ML: 900 IRRIGANT IRRIGATION at 05:30

## 2019-03-31 RX ADMIN — SODIUM CHLORIDE 3000 ML: 900 IRRIGANT IRRIGATION at 08:35

## 2019-03-31 RX ADMIN — ISOSORBIDE MONONITRATE 30 MG: 30 TABLET ORAL at 08:18

## 2019-03-31 RX ADMIN — SODIUM CHLORIDE 3000 ML: 900 IRRIGANT IRRIGATION at 10:15

## 2019-03-31 RX ADMIN — ATORVASTATIN CALCIUM 80 MG: 80 TABLET, FILM COATED ORAL at 08:18

## 2019-03-31 RX ADMIN — SODIUM CHLORIDE 3000 ML: 900 IRRIGANT IRRIGATION at 20:35

## 2019-03-31 RX ADMIN — FERROUS SULFATE TAB EC 325 MG (65 MG FE EQUIVALENT) 325 MG: 325 (65 FE) TABLET DELAYED RESPONSE at 08:17

## 2019-03-31 RX ADMIN — SODIUM CHLORIDE 3000 ML: 900 IRRIGANT IRRIGATION at 21:36

## 2019-03-31 RX ADMIN — SODIUM CHLORIDE 3000 ML: 900 IRRIGANT IRRIGATION at 13:45

## 2019-03-31 RX ADMIN — SODIUM CHLORIDE 3000 ML: 900 IRRIGANT IRRIGATION at 19:18

## 2019-03-31 RX ADMIN — LIDOCAINE HYDROCHLORIDE: 20 JELLY TOPICAL at 23:29

## 2019-03-31 ASSESSMENT — PAIN SCALES - GENERAL
PAINLEVEL_OUTOF10: 0

## 2019-03-31 NOTE — PROGRESS NOTES
PROBNP in the last 72 hours. No results for input(s): BNP in the last 72 hours. Lipids: No results for input(s): CHOL, HDL in the last 72 hours. Invalid input(s): LDLCALCU  INR: No results for input(s): INR in the last 72 hours. Objective:   Vitals: /84   Pulse 73   Temp 98.8 °F (37.1 °C) (Oral)   Resp 16   Ht 5' 8.11\" (1.73 m)   Wt 193 lb 9.6 oz (87.8 kg)   SpO2 96%   BMI 29.34 kg/m²      Constitutional and General Appearance: alert, cooperative, no distress and appears stated age  HEENT: PERRL, no cervical lymphadenopathy. No masses palpable. Normal oral mucosa  Respiratory:  · Normal excursion and expansion without use of accessory muscles  · Resp Auscultation: Good respiratory effort. No for increased work of breathing. On auscultation: clear to auscultation bilaterally  Cardiovascular:  · The apical impulse is not displaced  · Heart tones are crisp and normal. regular S1 and S2.  · Jugular venous pulsation Normal  · The carotid upstroke is normal in amplitude and contour without delay or bruit  · Peripheral pulses are symmetrical and full  Abdomen:  · No masses or tenderness  · Bowel sounds present  Extremities:  ·  No Cyanosis or Clubbing  ·  Lower extremity edema: No  · Skin: Warm and dry  Neurological:  · Alert and oriented. · Moves all extremities well  · No abnormalities of mood, affect, memory, mentation, or behavior are noted      Diagnostic Studies:       ECHO: 3/25/2019  Global left ventricular systolic function appears preserved with an  estimated ejection fraction of 55%. The left ventricular cavity size is within normal limits and the left  ventricular wall thickness is within normal limits. No definite specific wall motion abnormalities were identified. No significant valvular abnormalities. Evidence of mild (grade I) diastolic dysfunction is seen.       Cardiac Angiography: 3/28/2019  Successful PTCA -OMAR mid RCA and mid LAD  Patient's Active Problem List Active Problems:    S/P angioplasty with stent  Resolved Problems:    * No resolved hospital problems. *        Assessment / Acute Cardiac Problems:   1. Angina s/p  Successful PTCA -OMAR mid RCA and mid LAD on 3/28  2. Hypertension  3. Hyperlipidemia  4. Former smoker, 40 pack years  11. CKD stage III  6. Grosse Hematuria likely secondary to Barton trauma vs BPH      Plan of Treatment:   1. Continue DAPT, aspirin and Plavix  2. Continue Lipitor  3. Continue metoprolol, amlodipine and Imdur  4. Urology following for hematuria, patient not ready for discharge from urology standpoint. Chadd Rashid MD  Fellow, Cardiovascular Diseases    [facility]      Attending Cardiologist Addendum: I have reviewed and performed the history, physical, subjective, objective, assessment, and plan with the resident/fellow and agree with the note. I performed the history and physical personally. I have made changes to the note above as needed. Thank you for allowing me to participate in the care of this patient, please do not hesitate to call if you have any questions. Kaci Bueno, [id number] [facility]  [facility]. [facility]  [phone]

## 2019-03-31 NOTE — PROGRESS NOTES
Mee Plunkett MD, Estuardo Johnson MD, Ethan Fry MD, Nory Butt MD, Bautista Swartz MD, Joanie Uribe MD, & Shasta Tsai MD    Urology - Progress Note      Subjective: Patient had spasm overnight treated with Ditropan. Doing well this morning. Denies F/C/CP/SOA/N/V. No pain. Patient Vitals for the past 24 hrs:   BP Temp Temp src Pulse Resp SpO2 Weight   03/31/19 0736 134/84 98.8 °F (37.1 °C) Oral 67 16 96 % --   03/31/19 0640 -- -- -- -- -- -- 193 lb 9.6 oz (87.8 kg)   03/31/19 0340 (!) 141/65 -- -- 65 -- -- --   03/31/19 0000 124/62 -- -- 61 -- -- --   03/30/19 2345 129/63 -- -- 62 -- -- --   03/30/19 1915 (!) 161/88 98.2 °F (36.8 °C) Oral 80 17 96 % --   03/30/19 1815 -- -- -- 79 -- 97 % --   03/30/19 1540 (!) 134/54 98.3 °F (36.8 °C) Oral 88 14 99 % --   03/30/19 1500 (!) 128/54 -- -- 70 -- 96 % --   03/30/19 1450 -- -- -- 68 -- 95 % --   03/30/19 1400 (!) 131/49 -- -- 70 -- 95 % --   03/30/19 1300 (!) 122/54 -- -- 73 -- 95 % --   03/30/19 1117 (!) 142/68 99.9 °F (37.7 °C) Oral 73 18 96 % --   03/30/19 0958 -- -- -- 81 20 93 % --       Intake/Output Summary (Last 24 hours) at 3/31/2019 0844  Last data filed at 3/31/2019 0700  Gross per 24 hour   Intake 1928 ml   Output -41165 ml   Net 78798 ml       Recent Labs     03/29/19  0538   HGB 12.8*   HCT 40.0*     Recent Labs     03/29/19  0538      K 4.7      CO2 20   BUN 23   CREATININE 1.45*       No results for input(s): COLORU, PHUR, LABCAST, WBCUA, RBCUA, MUCUS, TRICHOMONAS, YEAST, BACTERIA, CLARITYU, SPECGRAV, LEUKOCYTESUR, UROBILINOGEN, BILIRUBINUR, BLOODU in the last 72 hours.     Invalid input(s): NITRATE, GLUCOSEUKETONESUAMORPHOUS    Additional Lab/culture results: None     Physical Exam:   NAD, AOx3  NLB, equal chest rise B/L  RRR, 2+ radial pulses  ABD S/ND, mild suprapubic tenderness  Barton draining light pink urine on moderate gtt CBI  Warm extremities, no calf tenderness    Interval Imaging Findings: None     Assessment:  Arnie Al Anna Marie Smith is a 80 y.o. male who presents with:   Gross hematuria, likely secondary to Barton trauma but possibly due to enlarged prostate or other pathology  Urinary retention 1000 cc  Difficult Barton catheterization and Barton trauma  Coronary artery disease status post catheterization and stent placements. Chronic kidney disease, baseline creatinine 1.4-1.5     Plan:   Continue CBI, wean as able, hand irrigate for obstruction/clots  Hematuria workup as outpatient   Continue Flomax  B&O suppositories prn  Bowel regimen  Maintain catheter for at least 1-2 weeks  Not yet ready for discharge from [facility] Fredy Kwong MD  PGY-2, [address] Department of Urology   8:44 AM 3/31/2019      Attending Physician Statement  I have discussed the care of the patient, including pertinent history and exam findings, with the resident. I have seen and examined the patient and the key elements of all parts of the encounter have been performed by me. I have reviewed all laboratory findings and imaging reports/films. I agree with the assessment, plan and orders as documented by the resident.   (GC Modifier)

## 2019-03-31 NOTE — PLAN OF CARE
Problem: Pain:  Goal: Pain level will decrease  Description  Pain level will decrease  3/31/2019 0801 by Ilia Kay RN  Outcome: Ongoing  3/31/2019 0644 by Viktor Daniel RN  Outcome: Ongoing  Goal: Control of acute pain  Description  Control of acute pain  3/31/2019 0801 by Ilia Kay RN  Outcome: Ongoing  3/31/2019 0644 by Viktor Daniel RN  Outcome: Ongoing  Goal: Control of chronic pain  Description  Control of chronic pain  3/31/2019 0801 by Ilia Kay RN  Outcome: Ongoing  3/31/2019 0644 by Viktor Daniel RN  Outcome: Ongoing     Problem: Falls - Risk of:  Goal: Will remain free from falls  Description  Will remain free from falls  3/31/2019 0801 by Ilia Kay RN  Outcome: Ongoing  3/31/2019 0644 by Viktor Daniel RN  Outcome: Ongoing  Goal: Absence of physical injury  Description  Absence of physical injury  3/31/2019 0801 by Ilia Kay RN  Outcome: Ongoing  3/31/2019 0644 by Viktor Daniel RN  Outcome: Ongoing

## 2019-03-31 NOTE — PROGRESS NOTES
[facility]   Progress Note                    Date:   3/31/2019  Patient name:  Cynthia Pagan  Date of admission:  3/28/2019 10:07 PM  MRN:   [id number]  YOB: 1935  PCP:    Antoni Desouza PA-C    Reason for Admission:  S/P angioplasty with stent [Z95.9]  S/P angioplasty with stent [Z95.9]  S/P angioplasty with stent [Z95.9]    Subjective:       Clinical Changes / Abnormalities: The patient was seen and examined . Patient was resting comfortably in bed, no acute issues overnight. States that his urologist wants him to leave tomorrow. I/O last 3 completed shifts: In: 8092 [P.O.:650; I.V.:970]  Out: -9980   I/O this shift:  In: 0221 [I.V.:1778]  Out: -700       In: 1928 [P.O.:150; I.V.:1778]  Out: -18204       Intake/Output Summary (Last 24 hours) at 3/31/2019 0649  Last data filed at 3/31/2019 0640  Gross per 24 hour   Intake 1928 ml   Output -78602 ml   Net 26102 ml         I/O since admission:  liters    Medications:   Scheduled Meds:   atorvastatin  80 mg Oral Daily    tamsulosin  0.4 mg Oral Daily    aspirin  81 mg Oral Daily    ferrous sulfate  325 mg Oral Daily with breakfast    metoprolol tartrate  25 mg Oral BID    amLODIPine  5 mg Oral Daily    isosorbide mononitrate  30 mg Oral Daily    sodium chloride flush  10 mL Intravenous 2 times per day    clopidogrel  75 mg Oral Daily     Continuous Infusions:   sodium chloride      sodium chloride 75 mL/hr at 03/30/19 1938     CBC:   Recent Labs     03/29/19  0538   HGB 12.8*     BMP:    Recent Labs     03/29/19  0538      K 4.7      CO2 20   BUN 23   CREATININE 1.45*   GLUCOSE 143*     Hepatic: No results for input(s): AST, ALT, ALB, BILITOT, ALKPHOS in the last 72 hours. Troponin:No results for input(s): TROPHS in the last 72 hours. No results for input(s): TROPONINI in the last 72 hours. No results for input(s): TROPONINT in the last 72 hours.   BNP: No results for input(s): PROBNP Problems:    S/P angioplasty with stent  Resolved Problems:    * No resolved hospital problems. *        Assessment / Acute Cardiac Problems:   1. Angina s/p  Successful PTCA -OMAR mid RCA and mid LAD on 3/28  2. Hypertension  3. Hyperlipidemia  4. Former smoker, 40 pack years  11. CKD stage III  6. Grosse Hematuria likely secondary to Barton trauma vs BPH          Plan of Treatment:   1. Continue DAPT, aspirin and Plavix  2. Continue Lipitor  3. Continue metoprolol, amlodipine and Imdur  4. Urology following for hematuria      Marlena Epperson MD  Fellow, [address]        Attending Cardiologist Addendum: I have reviewed and performed the history, physical, subjective, objective, assessment, and plan with the resident/fellow and agree with the note. I performed the history and physical personally. I have made changes to the note above as needed. Thank you for allowing me to participate in the care of this patient, please do not hesitate to call if you have any questions. Brittany Soni, [address] [facility]  [facility]  [phone]

## 2019-03-31 NOTE — PROGRESS NOTES
Nurse/writer has attempted to slow down CBI according to Urology's orders but dainage then clots off. CBI then restarts to flow with fast irrigation.

## 2019-04-01 VITALS
OXYGEN SATURATION: 96 % | WEIGHT: 193.6 LBS | HEART RATE: 79 BPM | RESPIRATION RATE: 18 BRPM | BODY MASS INDEX: 29.34 KG/M2 | SYSTOLIC BLOOD PRESSURE: 168 MMHG | TEMPERATURE: 97.7 F | DIASTOLIC BLOOD PRESSURE: 67 MMHG | HEIGHT: 68 IN

## 2019-04-01 LAB
ANION GAP SERPL CALCULATED.3IONS-SCNC: 10 MMOL/L (ref 9–17)
BUN BLDV-MCNC: 15 MG/DL (ref 8–23)
BUN/CREAT BLD: ABNORMAL (ref 9–20)
CALCIUM SERPL-MCNC: 9 MG/DL (ref 8.6–10.4)
CHLORIDE BLD-SCNC: 110 MMOL/L (ref 98–107)
CO2: 21 MMOL/L (ref 20–31)
CREAT SERPL-MCNC: 1.05 MG/DL (ref 0.7–1.2)
GFR AFRICAN AMERICAN: >60 ML/MIN
GFR NON-AFRICAN AMERICAN: >60 ML/MIN
GFR SERPL CREATININE-BSD FRML MDRD: ABNORMAL ML/MIN/{1.73_M2}
GFR SERPL CREATININE-BSD FRML MDRD: ABNORMAL ML/MIN/{1.73_M2}
GLUCOSE BLD-MCNC: 82 MG/DL (ref 70–99)
HCT VFR BLD CALC: 33.5 % (ref 40.7–50.3)
HEMOGLOBIN: 10.8 G/DL (ref 13–17)
MCH RBC QN AUTO: 27.3 PG (ref 25.2–33.5)
MCHC RBC AUTO-ENTMCNC: 32.2 G/DL (ref 28.4–34.8)
MCV RBC AUTO: 84.6 FL (ref 82.6–102.9)
NRBC AUTOMATED: 0 PER 100 WBC
PDW BLD-RTO: 13.1 % (ref 11.8–14.4)
PLATELET # BLD: 181 K/UL (ref 138–453)
PMV BLD AUTO: 10.2 FL (ref 8.1–13.5)
POTASSIUM SERPL-SCNC: 4.3 MMOL/L (ref 3.7–5.3)
RBC # BLD: 3.96 M/UL (ref 4.21–5.77)
SODIUM BLD-SCNC: 141 MMOL/L (ref 135–144)
WBC # BLD: 7.6 K/UL (ref 3.5–11.3)

## 2019-04-01 PROCEDURE — 36415 COLL VENOUS BLD VENIPUNCTURE: CPT

## 2019-04-01 PROCEDURE — 80048 BASIC METABOLIC PNL TOTAL CA: CPT

## 2019-04-01 PROCEDURE — 85027 COMPLETE CBC AUTOMATED: CPT

## 2019-04-01 PROCEDURE — 94762 N-INVAS EAR/PLS OXIMTRY CONT: CPT

## 2019-04-01 PROCEDURE — 6370000000 HC RX 637 (ALT 250 FOR IP): Performed by: STUDENT IN AN ORGANIZED HEALTH CARE EDUCATION/TRAINING PROGRAM

## 2019-04-01 PROCEDURE — 2580000003 HC RX 258: Performed by: STUDENT IN AN ORGANIZED HEALTH CARE EDUCATION/TRAINING PROGRAM

## 2019-04-01 RX ORDER — TAMSULOSIN HYDROCHLORIDE 0.4 MG/1
0.4 CAPSULE ORAL DAILY
Qty: 30 CAPSULE | Refills: 3 | Status: SHIPPED | OUTPATIENT
Start: 2019-04-02 | End: 2019-08-30

## 2019-04-01 RX ORDER — LISINOPRIL 5 MG/1
2.5 TABLET ORAL DAILY
Qty: 90 TABLET | Refills: 1 | Status: SHIPPED | OUTPATIENT
Start: 2019-04-01 | End: 2020-03-19 | Stop reason: SDUPTHER

## 2019-04-01 RX ADMIN — Medication 10 ML: at 08:15

## 2019-04-01 RX ADMIN — SODIUM CHLORIDE 3000 ML: 900 IRRIGANT IRRIGATION at 03:09

## 2019-04-01 RX ADMIN — SODIUM CHLORIDE 3000 ML: 900 IRRIGANT IRRIGATION at 01:26

## 2019-04-01 RX ADMIN — SODIUM CHLORIDE 3000 ML: 900 IRRIGANT IRRIGATION at 06:14

## 2019-04-01 RX ADMIN — Medication 3 MG: at 00:10

## 2019-04-01 RX ADMIN — METOPROLOL TARTRATE 25 MG: 25 TABLET ORAL at 08:14

## 2019-04-01 RX ADMIN — SODIUM CHLORIDE 3000 ML: 900 IRRIGANT IRRIGATION at 00:49

## 2019-04-01 RX ADMIN — SODIUM CHLORIDE 3000 ML: 900 IRRIGANT IRRIGATION at 00:10

## 2019-04-01 RX ADMIN — CLOPIDOGREL 75 MG: 75 TABLET, FILM COATED ORAL at 08:14

## 2019-04-01 RX ADMIN — SODIUM CHLORIDE 3000 ML: 900 IRRIGANT IRRIGATION at 05:10

## 2019-04-01 RX ADMIN — ATORVASTATIN CALCIUM 80 MG: 80 TABLET, FILM COATED ORAL at 08:14

## 2019-04-01 RX ADMIN — ASPIRIN 81 MG: 81 TABLET ORAL at 08:14

## 2019-04-01 RX ADMIN — FERROUS SULFATE TAB EC 325 MG (65 MG FE EQUIVALENT) 325 MG: 325 (65 FE) TABLET DELAYED RESPONSE at 08:14

## 2019-04-01 RX ADMIN — AMLODIPINE BESYLATE 5 MG: 5 TABLET ORAL at 08:13

## 2019-04-01 RX ADMIN — SODIUM CHLORIDE 3000 ML: 900 IRRIGANT IRRIGATION at 07:32

## 2019-04-01 RX ADMIN — ISOSORBIDE MONONITRATE 30 MG: 30 TABLET ORAL at 08:14

## 2019-04-01 RX ADMIN — SODIUM CHLORIDE 3000 ML: 900 IRRIGANT IRRIGATION at 03:56

## 2019-04-01 RX ADMIN — TAMSULOSIN HYDROCHLORIDE 0.4 MG: 0.4 CAPSULE ORAL at 08:14

## 2019-04-01 RX ADMIN — SODIUM CHLORIDE 3000 ML: 900 IRRIGANT IRRIGATION at 02:25

## 2019-04-01 NOTE — PROGRESS NOTES
Urology Progress Note    Subjective: Rowdy Alexander is a 80 y.o. male. His/Her current Diet is: DIET CARDIAC;. The patient is * No surgery found * from     No acute events overnight. No chest pain, shortness of breath, nausea, vomiting, fevers, chills  Passed one clot  No hand irrigations overnight    Patient Vitals for the past 24 hrs:   BP Temp Temp src Pulse Resp SpO2   04/01/19 0644 (!) 163/61 97.7 °F (36.5 °C) Oral 63 16 --   04/01/19 0339 (!) 155/79 98.7 °F (37.1 °C) Oral 70 18 95 %   04/01/19 0021 139/71 98.2 °F (36.8 °C) Oral 69 19 97 %   03/31/19 1938 (!) 153/92 97.5 °F (36.4 °C) Oral 70 21 97 %   03/31/19 1120 (!) 131/49 98.3 °F (36.8 °C) Oral 66 20 97 %   03/31/19 0845 -- -- -- 73 16 96 %   03/31/19 0736 134/84 98.8 °F (37.1 °C) Oral 67 16 96 %       Intake/Output Summary (Last 24 hours) at 4/1/2019 0725  Last data filed at 4/1/2019 6977  Gross per 24 hour   Intake 1730.9 ml   Output -7550 ml   Net 9280.9 ml       Recent Labs     04/01/19  0607   WBC 7.6   HGB 10.8*   HCT 33.5*   MCV 84.6        Recent Labs     04/01/19  0607      K 4.3   *   CO2 21   BUN 15   CREATININE 1.05       No results for input(s): COLORU, PHUR, LABCAST, WBCUA, RBCUA, MUCUS, TRICHOMONAS, YEAST, BACTERIA, CLARITYU, SPECGRAV, LEUKOCYTESUR, UROBILINOGEN, BILIRUBINUR, BLOODU in the last 72 hours. Invalid input(s): NITRATE, GLUCOSEUKETONESUAMORPHOUS    Physical Exam:     NAD, AOx3  RRR. Peripheral pulses palpable  Respirations nonlabored, symmetric chest rise bilaterally  Abdomen: soft, nontender, nondistended  Lower extremities: No edema of calf tenderness bilaterally  No CVA tenderness bilaterally  Barton draining yellow urine      Impression:      80year old male admitted with gross hematuria     Problem List  1. Gross hematuria  2. Urinary retention with difficult Barton catheterization (scoped Barton catheter 3/29/19)  3. Urethral false passages  4.  History of Elevated PSA with negative prostate biopsy in [place] (PSA 15 on 1/2019)    Plan:     - CBI off this morning. OK to cap third port.    - Flomax (started in house)  - Maintain Barton catheter for 7 days  - Monitor Hb  - Urine culture 3/29/19 No growth  - Will need outpatient follow up with [facility] urologist to complete hematuria workup  -stable for discharge from urologic standpoint    Emiliano Lea  Urology Resident, PGY3  7:25 AM 4/1/2019     Inna Dyson MD  PGY-6, Urology

## 2019-04-01 NOTE — PROGRESS NOTES
Pt hand irrigated 3 times today with moderates clots released each time. Irrigation unble to be slowed down due to clotting.

## 2019-04-03 ENCOUNTER — OFFICE VISIT (OUTPATIENT)
Dept: CARDIOLOGY | Age: 84
End: 2019-04-03
Payer: MEDICARE

## 2019-04-03 VITALS
WEIGHT: 179 LBS | RESPIRATION RATE: 20 BRPM | OXYGEN SATURATION: 97 % | SYSTOLIC BLOOD PRESSURE: 150 MMHG | BODY MASS INDEX: 27.13 KG/M2 | HEIGHT: 68 IN | DIASTOLIC BLOOD PRESSURE: 79 MMHG | HEART RATE: 75 BPM

## 2019-04-03 DIAGNOSIS — Z95.820 S/P ANGIOPLASTY WITH STENT: ICD-10-CM

## 2019-04-03 DIAGNOSIS — I10 ESSENTIAL HYPERTENSION: ICD-10-CM

## 2019-04-03 DIAGNOSIS — E78.5 DYSLIPIDEMIA: ICD-10-CM

## 2019-04-03 DIAGNOSIS — I25.10 ASHD (ARTERIOSCLEROTIC HEART DISEASE): Primary | ICD-10-CM

## 2019-04-03 PROCEDURE — 1111F DSCHRG MED/CURRENT MED MERGE: CPT | Performed by: INTERNAL MEDICINE

## 2019-04-03 PROCEDURE — 1036F TOBACCO NON-USER: CPT | Performed by: INTERNAL MEDICINE

## 2019-04-03 PROCEDURE — 4040F PNEUMOC VAC/ADMIN/RCVD: CPT | Performed by: INTERNAL MEDICINE

## 2019-04-03 PROCEDURE — 1123F ACP DISCUSS/DSCN MKR DOCD: CPT | Performed by: INTERNAL MEDICINE

## 2019-04-03 PROCEDURE — G8419 CALC BMI OUT NRM PARAM NOF/U: HCPCS | Performed by: INTERNAL MEDICINE

## 2019-04-03 PROCEDURE — G8598 ASA/ANTIPLAT THER USED: HCPCS | Performed by: INTERNAL MEDICINE

## 2019-04-03 PROCEDURE — 99214 OFFICE O/P EST MOD 30 MIN: CPT | Performed by: INTERNAL MEDICINE

## 2019-04-03 PROCEDURE — G8427 DOCREV CUR MEDS BY ELIG CLIN: HCPCS | Performed by: INTERNAL MEDICINE

## 2019-04-03 NOTE — PROGRESS NOTES
Tita Leiva am scribing for and in the presence of Shalonda Viramontes MD.    Patient: Brock Agrawal  : 1935  Date of Visit: April 3, 2019    REASON FOR VISIT / CONSULTATION: Follow-up (HX: Abnormal Stress Test, HTN, Hyperlipidemia. Heart Cath done on 3/28 Stents done by Dr. Flavio Salvador. Pt states he is doing good. Deneis: CP, Palptiaotns, Lighteaded/dizziness, SOB. )      History of Present Illness:        Dear Constantino Zelaya PA-C    I had the pleasure of seeing Brock Agrawal in my office today. Mr. Hakeem Minaya is a 80 y.o. male who presented for follow-up    He initially presented with a chest tightness. Subsequent cardiac catheterizations showed two-vessel coronary artery disease. Patient status post PCI to LAD and RCA. He denies any prior history of myocardial infarction or heart failure. No significant heart rhythm problem. He has history of abnormal stress test back in 2017, treated medically. He does have a long history of high cholesterol. He is a past smoker and has quit over 40 years ago. His mother had heart problems unsure about fathers history. S/P Heart Cath done on 3/28/019, S/P PCI to mid LAD and mid RCA. Mr. Hakeem Minaya currently doing well from a cardiovascular standpoint. Denies any chest pain, pressure or tightness. No worsening shortness of breath. Functional capacity is limited because of arthritis. He reported having blood in urine, Barton catheter in place. He is going to see Dr. Yaneli Barba tomorrow. He is taking his aspirin and Plavix.       PAST MEDICAL HISTORY:        Past Medical History:   Diagnosis Date    Abnormal stress test     Arrhythmia     ASHD (arteriosclerotic heart disease)     CKD (chronic kidney disease)     Closed traumatic fracture of right femur with routine healing     trauma while in [employer], plate and scews placed and removed    COPD (chronic obstructive pulmonary disease) ([facility] [address]     Elevated PSA     Enlarged prostate     Gallstones     Gout     History of kidney stones     Hydrocele in adult     Hypertension     Renal stones     Trigger finger     Vertigo     Wrist fracture, left        CURRENT ALLERGIES: Penicillins REVIEW OF SYSTEMS: 14 systems were reviewed. Pertinent positives and negatives as above, all else negative. Past Surgical History:   Procedure Laterality Date    APPENDECTOMY      20 yo    CARDIAC CATHETERIZATION Left 2019    Right [facility]/ : OMAR to mid RCA and mid LAD weith Dr. Ritter Gloss    plate and screws s/p trauma  and taken out in [address] [place] [address]  2012    LITHOTRIPSY      PROSTATE BIOPSY  10/22/1998, 2004    TOTAL HIP ARTHROPLASTY Right     Social History:  Social History     Tobacco Use    Smoking status: Former Smoker     Packs/day: 1.00     Years: 25.00     Pack years: 25.00     Last attempt to quit: 1974     Years since quittin.2    Smokeless tobacco: Never Used   Substance Use Topics    Alcohol use: No     Comment: quit 38 years ago.     Drug use: No        CURRENT MEDICATIONS:        Outpatient Medications Marked as Taking for the 4/3/19 encounter (Office Visit) with Vincenzo Lopez MD   Medication Sig Dispense Refill    lisinopril (PRINIVIL;ZESTRIL) 5 MG tablet Take 0.5 tablets by mouth daily 90 tablet 1    tamsulosin (FLOMAX) 0.4 MG capsule Take 1 capsule by mouth daily 30 capsule 3    atorvastatin (LIPITOR) 20 MG tablet Take 4 tablets by mouth daily 90 tablet 3    clopidogrel (PLAVIX) 75 MG tablet Take 1 tablet by mouth daily 30 tablet 3    Omega-3 Fatty Acids (FISH OIL) 1000 MG CAPS Take 3,000 mg by mouth daily       amLODIPine (NORVASC) 5 MG tablet Take 1 tablet by mouth daily 90 tablet 3    isosorbide mononitrate (IMDUR) 30 MG extended release tablet Take 1 tablet by mouth daily 90 tablet 3    metoprolol tartrate (LOPRESSOR) 25 MG tablet Take 1 tablet by mouth 2 times daily 180 04/01/2019    HGB 10.8 (L) 04/01/2019    HCT 33.5 (L) 04/01/2019     04/01/2019    CHOL 219 (H) 01/21/2019    TRIG 150 (H) 01/21/2019    HDL 42.5 01/21/2019    ALT 11 01/21/2019    AST 20 01/21/2019     04/01/2019    K 4.3 04/01/2019     (H) 04/01/2019    CREATININE 1.05 04/01/2019    BUN 15 04/01/2019    CO2 21 04/01/2019    PSA 9.27 (H) 07/18/2012    LABA1C 5.4 01/21/2019       ASSESSMENT:     1. ASHD (arteriosclerotic heart disease)    2. S/P angioplasty with stent    3. Essential hypertension    4. Dyslipidemia         PLAN:      · Atherosclerotic Heart Disease: S/P Stents done on 3/28 by Dr. Mike Barlow Antiplatelet Agent: Continue aspirin 81 mg daily and Plavix 75 mg daily.  Beta Blocker: Continue Metoprolol tartrate (Lopressor) 25 mg bid.  Anti-anginal medications: Continue nitroglycerin 0.4 mg tablets as needed for chest pain.  Cholesterol Reduction Therapy: Continue Atorvastatin (Lipitor) 20 mg daily.  Reviewed his stenting with him and answered all of his questions that he had.  He denied going to cardiac rehab he reports that he will do his own work outs at home. · Essential Hypertension: Borderline controlled   ACE Inibitor/ARB: Continue lisinopril 2.5 mg daily.  Calcium Channel Blocker: Continue amlodipine (Norvasc) 5 mg once daily.  Beta Blocker: Continue Metoprolol tartrate (Lopressor) 25 mg bid. · Hyperlipidemia: Mixed  · Cholesterol Reduction Therapy: Continue Atorvastatin (Lipitor) 20 mg daily. · Follow-up repeat lipid panel. Urinary retention/blood and urine  I want him to continue aspirin and Plavix because of his recent stent but if bleeding became uncontrollable then we may need to hold his aspirin for some time. In the meantime, I encouraged Mr. Jose Burks to continue to take his other medications.      FOLLOW UP:   I told Mr. Jose Burks to call my office if he had any problems, but otherwise I asked him to Return in about 3 months (around 7/3/2019) for Follow up. However, I would be happy to see him sooner should the need arise. Sincerely,  Haley Allen MD, F.A.C.C. [facility] Cardiology Specialist     [facility]  [facility], [address] [place] [address]  Phone: [phone], Fax: [phone]     I believe that the risk of significant morbidity and mortality related to the patient's current medical conditions are: Intermediate to high. The documentation recorded by the scribe, accurately and completely reflects the services I personally performed and the decisions made by me. Haley Allen MD, F.A.C.C.  April 3, 2019

## 2019-04-03 NOTE — PATIENT INSTRUCTIONS
SURVEY:    You may be receiving a survey from [employer] regarding your visit today. Please complete the survey to enable us to provide the highest quality of care to you and your family. If you cannot score us a very good on any question, please call the office to discuss how we could have made your experience a very good one. Thank you.

## 2019-04-04 ENCOUNTER — OFFICE VISIT (OUTPATIENT)
Dept: UROLOGY | Age: 84
End: 2019-04-04
Payer: MEDICARE

## 2019-04-04 ENCOUNTER — TELEPHONE (OUTPATIENT)
Dept: UROLOGY | Age: 84
End: 2019-04-04

## 2019-04-04 VITALS
BODY MASS INDEX: 27.37 KG/M2 | DIASTOLIC BLOOD PRESSURE: 56 MMHG | WEIGHT: 180 LBS | HEART RATE: 88 BPM | SYSTOLIC BLOOD PRESSURE: 106 MMHG

## 2019-04-04 DIAGNOSIS — R33.9 URINARY RETENTION: ICD-10-CM

## 2019-04-04 DIAGNOSIS — N13.8 BPH WITH OBSTRUCTION/LOWER URINARY TRACT SYMPTOMS: ICD-10-CM

## 2019-04-04 DIAGNOSIS — N40.1 BPH WITH OBSTRUCTION/LOWER URINARY TRACT SYMPTOMS: ICD-10-CM

## 2019-04-04 DIAGNOSIS — R31.0 GROSS HEMATURIA: Primary | ICD-10-CM

## 2019-04-04 PROCEDURE — G8419 CALC BMI OUT NRM PARAM NOF/U: HCPCS | Performed by: NURSE PRACTITIONER

## 2019-04-04 PROCEDURE — G8598 ASA/ANTIPLAT THER USED: HCPCS | Performed by: NURSE PRACTITIONER

## 2019-04-04 PROCEDURE — G8427 DOCREV CUR MEDS BY ELIG CLIN: HCPCS | Performed by: NURSE PRACTITIONER

## 2019-04-04 PROCEDURE — 1036F TOBACCO NON-USER: CPT | Performed by: NURSE PRACTITIONER

## 2019-04-04 PROCEDURE — 1123F ACP DISCUSS/DSCN MKR DOCD: CPT | Performed by: NURSE PRACTITIONER

## 2019-04-04 PROCEDURE — 1111F DSCHRG MED/CURRENT MED MERGE: CPT | Performed by: NURSE PRACTITIONER

## 2019-04-04 PROCEDURE — 99215 OFFICE O/P EST HI 40 MIN: CPT | Performed by: NURSE PRACTITIONER

## 2019-04-04 PROCEDURE — 51798 US URINE CAPACITY MEASURE: CPT | Performed by: NURSE PRACTITIONER

## 2019-04-04 PROCEDURE — 4040F PNEUMOC VAC/ADMIN/RCVD: CPT | Performed by: NURSE PRACTITIONER

## 2019-04-04 PROCEDURE — 51700 IRRIGATION OF BLADDER: CPT | Performed by: NURSE PRACTITIONER

## 2019-04-04 ASSESSMENT — ENCOUNTER SYMPTOMS
VOMITING: 0
SHORTNESS OF BREATH: 0
COUGH: 0
COLOR CHANGE: 0
CONSTIPATION: 0
EYE PAIN: 0
EYE REDNESS: 0
BACK PAIN: 0
ABDOMINAL PAIN: 0
WHEEZING: 0
NAUSEA: 0

## 2019-04-04 NOTE — PROGRESS NOTES
Pt had gupta catheter placed on 3/29/19 for gross hematuria and urinary retention    Sterile water was instilled into bladder per gupta catheter, per gravity drainage. When pt verbalized urge to void, balloon was deflated and gupta catheter was removed without difficulty. Pt was then encouraged to void. Pt tolerated procedure well. Filled with - 150 mL, Pt able to void - no -, PVR - 146 mL    Pt instructed to drink plenty of fluids, try to urinate q 2 hrs, call office in 6 hrs with update of amount voided, and if not voiding will need to return to office to have gupta replaced. Also instructed to return to office or ER if goes >6 hrs without voiding or has strong urge to void/suprapubic discomfort and cannot urinate. Pt verbalizes understanding of plan. F/u 2 weeks.

## 2019-04-04 NOTE — PROGRESS NOTES
HPI:    Patient is a 80 y.o. male in no acute distress. He is alert and oriented to person, place, and time. New patient referral from [facility]. V's for urinary retention and gross hematuria. Patient was admitted to [facility]. V's due to cardiac issues and did subsequently have to cardiac stents placed. While admitted urology was consult today due to gross hematuria and urinary retention after cardiac catheterization. A Barton catheter was placed for >1000ml. urine culture was negative for infection. There was difficulty with catheter placement. He was placed on Flomax daily. Prior to hospital admission he denies daytime frequency, urgency or incontinence. He did have nocturia 2-3 times per night. He denies history of hematuria. He did see Dr. Celeste Hsu in the past for history of stones. He is having daily bowel movements since hospital discharge.     Past Medical History:   Diagnosis Date    Abnormal stress test 2017    Arrhythmia     ASHD (arteriosclerotic heart disease)     CKD (chronic kidney disease)     Closed traumatic fracture of right femur with routine healing 1955    trauma while in [employer], plate and scews placed and removed    COPD (chronic obstructive pulmonary disease) (HCC)     Elevated PSA     Enlarged prostate     Gallstones     Gout     History of kidney stones     Hydrocele in adult     Hypertension     Renal stones     Trigger finger     Vertigo     Wrist fracture, left      Past Surgical History:   Procedure Laterality Date    APPENDECTOMY      22 yo    CARDIAC CATHETERIZATION Left 03/28/2019    Right [facility] : [facility]    plate and screws s/p trauma  and taken out in [address] [place] [address]  9/12/2012    LITHOTRIPSY      PROSTATE BIOPSY  10/22/1998, 8/26/2004    TOTAL HIP ARTHROPLASTY Right 1999     Outpatient Encounter Medications as of 4/4/2019   Medication Sig Dispense Refill    lisinopril (PRINIVIL;ZESTRIL) 5 MG tablet Take 0.5 tablets by mouth daily 90 tablet 1    tamsulosin (FLOMAX) 0.4 MG capsule Take 1 capsule by mouth daily 30 capsule 3    atorvastatin (LIPITOR) 20 MG tablet Take 4 tablets by mouth daily 90 tablet 3    clopidogrel (PLAVIX) 75 MG tablet Take 1 tablet by mouth daily 30 tablet 3    Omega-3 Fatty Acids (FISH OIL) 1000 MG CAPS Take 3,000 mg by mouth daily       amLODIPine (NORVASC) 5 MG tablet Take 1 tablet by mouth daily 90 tablet 3    isosorbide mononitrate (IMDUR) 30 MG extended release tablet Take 1 tablet by mouth daily 90 tablet 3    metoprolol tartrate (LOPRESSOR) 25 MG tablet Take 1 tablet by mouth 2 times daily 180 tablet 0    nitroGLYCERIN (NITROSTAT) 0.4 MG SL tablet Place 1 tablet under the tongue every 5 minutes as needed for Chest pain 25 tablet 0    ferrous sulfate 325 (65 Fe) MG tablet Take 325 mg by mouth daily (with breakfast)      aspirin 81 MG EC tablet Take 81 mg by mouth daily. No facility-administered encounter medications on file as of 4/4/2019.        Current Outpatient Medications on File Prior to Visit   Medication Sig Dispense Refill    lisinopril (PRINIVIL;ZESTRIL) 5 MG tablet Take 0.5 tablets by mouth daily 90 tablet 1    tamsulosin (FLOMAX) 0.4 MG capsule Take 1 capsule by mouth daily 30 capsule 3    atorvastatin (LIPITOR) 20 MG tablet Take 4 tablets by mouth daily 90 tablet 3    clopidogrel (PLAVIX) 75 MG tablet Take 1 tablet by mouth daily 30 tablet 3    Omega-3 Fatty Acids (FISH OIL) 1000 MG CAPS Take 3,000 mg by mouth daily       amLODIPine (NORVASC) 5 MG tablet Take 1 tablet by mouth daily 90 tablet 3    isosorbide mononitrate (IMDUR) 30 MG extended release tablet Take 1 tablet by mouth daily 90 tablet 3    metoprolol tartrate (LOPRESSOR) 25 MG tablet Take 1 tablet by mouth 2 times daily 180 tablet 0    nitroGLYCERIN (NITROSTAT) 0.4 MG SL tablet Place 1 tablet under the tongue every 5 minutes as needed for Chest pain 25 tablet 0    ferrous sulfate 325 (65 Fe) MG tablet Take 325 mg by mouth daily (with breakfast)      aspirin 81 MG EC tablet Take 81 mg by mouth daily. No current facility-administered medications on file prior to visit. Penicillins  Family History   Problem Relation Age of Onset    Heart Disease Mother     Hypertension Mother     Heart Attack Mother     Parkinsonism Father     Heart Attack Father     Heart Disease Father     Kidney Disease Brother         dialysis    Arrhythmia Sister     No Known Problems Brother      Social History     Tobacco Use   Smoking Status Former Smoker    Packs/day: 1.00    Years: 25.00    Pack years: 25.00    Last attempt to quit: 1974    Years since quittin.2   Smokeless Tobacco Never Used       Social History     Substance and Sexual Activity   Alcohol Use No    Comment: quit 38 years ago. Review of Systems   Constitutional: Negative for appetite change, chills and fever. Eyes: Negative for pain, redness and visual disturbance. Respiratory: Negative for cough, shortness of breath and wheezing. Cardiovascular: Negative for chest pain and leg swelling. Gastrointestinal: Negative for abdominal pain, constipation, nausea and vomiting. Genitourinary: Positive for difficulty urinating and hematuria. Negative for decreased urine volume, discharge, dysuria, enuresis, flank pain, frequency, penile pain, scrotal swelling, testicular pain and urgency. Musculoskeletal: Negative for back pain, joint swelling and myalgias. Skin: Negative for color change, rash and wound. Neurological: Negative for dizziness, tremors and numbness. Hematological: Negative for adenopathy. Does not bruise/bleed easily.        BP (!) 106/56 (Site: Right Upper Arm, Position: Sitting, Cuff Size: Medium Adult)   Pulse 88   Wt 180 lb (81.6 kg)   BMI 27.37 kg/m²       PHYSICAL EXAM:  Constitutional: Patient in no acute

## 2019-04-04 NOTE — TELEPHONE ENCOUNTER
Patient called to say that he drank 64 oz of fluid when he got home today after catheter removed. He said he has voided about 50 ml and feels ok other than having a \"thumping\" headache which he stated he took Tylenol.   Advised patient that he would need to go to ER if not able to void

## 2019-04-15 ENCOUNTER — HOSPITAL ENCOUNTER (OUTPATIENT)
Dept: CT IMAGING | Age: 84
Discharge: HOME | End: 2019-04-17
Payer: MEDICARE

## 2019-04-15 DIAGNOSIS — R31.0 GROSS HEMATURIA: ICD-10-CM

## 2019-04-15 PROCEDURE — 74178 CT ABD&PLV WO CNTR FLWD CNTR: CPT

## 2019-04-15 PROCEDURE — 6360000004 HC RX CONTRAST MEDICATION: Performed by: NURSE PRACTITIONER

## 2019-04-15 RX ADMIN — IOPAMIDOL 110 ML: 755 INJECTION, SOLUTION INTRAVENOUS at 09:20

## 2019-04-18 ENCOUNTER — OFFICE VISIT (OUTPATIENT)
Dept: UROLOGY | Age: 84
End: 2019-04-18
Payer: MEDICARE

## 2019-04-18 ENCOUNTER — HOSPITAL ENCOUNTER (OUTPATIENT)
Age: 84
Setting detail: SPECIMEN
Discharge: HOME | End: 2019-04-18
Payer: MEDICARE

## 2019-04-18 VITALS
DIASTOLIC BLOOD PRESSURE: 58 MMHG | BODY MASS INDEX: 27.28 KG/M2 | WEIGHT: 180 LBS | SYSTOLIC BLOOD PRESSURE: 118 MMHG | HEIGHT: 68 IN

## 2019-04-18 DIAGNOSIS — N40.1 BPH WITH OBSTRUCTION/LOWER URINARY TRACT SYMPTOMS: ICD-10-CM

## 2019-04-18 DIAGNOSIS — R33.9 URINARY RETENTION: ICD-10-CM

## 2019-04-18 DIAGNOSIS — R31.0 GROSS HEMATURIA: Primary | ICD-10-CM

## 2019-04-18 DIAGNOSIS — N13.8 BPH WITH OBSTRUCTION/LOWER URINARY TRACT SYMPTOMS: ICD-10-CM

## 2019-04-18 DIAGNOSIS — R31.0 GROSS HEMATURIA: ICD-10-CM

## 2019-04-18 LAB
-: ABNORMAL
AMORPHOUS: ABNORMAL
BACTERIA: ABNORMAL
BILIRUBIN URINE: NEGATIVE
CASTS UA: ABNORMAL /LPF
COLOR: YELLOW
COMMENT UA: ABNORMAL
CRYSTALS, UA: ABNORMAL /HPF
EPITHELIAL CELLS UA: ABNORMAL /HPF (ref 0–5)
GLUCOSE URINE: NEGATIVE
KETONES, URINE: ABNORMAL
LEUKOCYTE ESTERASE, URINE: ABNORMAL
MUCUS: ABNORMAL
NITRITE, URINE: NEGATIVE
OTHER OBSERVATIONS UA: ABNORMAL
PH UA: 6 (ref 5–9)
PROTEIN UA: ABNORMAL
RBC UA: ABNORMAL /HPF (ref 0–2)
RENAL EPITHELIAL, UA: ABNORMAL /HPF
SPECIFIC GRAVITY UA: 1.02 (ref 1.01–1.02)
TRICHOMONAS: ABNORMAL
TURBIDITY: ABNORMAL
URINE HGB: ABNORMAL
UROBILINOGEN, URINE: NORMAL
WBC UA: ABNORMAL /HPF (ref 0–5)
YEAST: ABNORMAL

## 2019-04-18 PROCEDURE — 87077 CULTURE AEROBIC IDENTIFY: CPT

## 2019-04-18 PROCEDURE — 1036F TOBACCO NON-USER: CPT | Performed by: NURSE PRACTITIONER

## 2019-04-18 PROCEDURE — 4040F PNEUMOC VAC/ADMIN/RCVD: CPT | Performed by: NURSE PRACTITIONER

## 2019-04-18 PROCEDURE — G8427 DOCREV CUR MEDS BY ELIG CLIN: HCPCS | Performed by: NURSE PRACTITIONER

## 2019-04-18 PROCEDURE — 1123F ACP DISCUSS/DSCN MKR DOCD: CPT | Performed by: NURSE PRACTITIONER

## 2019-04-18 PROCEDURE — 87086 URINE CULTURE/COLONY COUNT: CPT

## 2019-04-18 PROCEDURE — 99214 OFFICE O/P EST MOD 30 MIN: CPT | Performed by: NURSE PRACTITIONER

## 2019-04-18 PROCEDURE — G8419 CALC BMI OUT NRM PARAM NOF/U: HCPCS | Performed by: NURSE PRACTITIONER

## 2019-04-18 PROCEDURE — 1111F DSCHRG MED/CURRENT MED MERGE: CPT | Performed by: NURSE PRACTITIONER

## 2019-04-18 PROCEDURE — 51798 US URINE CAPACITY MEASURE: CPT | Performed by: NURSE PRACTITIONER

## 2019-04-18 PROCEDURE — 81001 URINALYSIS AUTO W/SCOPE: CPT

## 2019-04-18 PROCEDURE — G8598 ASA/ANTIPLAT THER USED: HCPCS | Performed by: NURSE PRACTITIONER

## 2019-04-18 PROCEDURE — 87186 SC STD MICRODIL/AGAR DIL: CPT

## 2019-04-18 ASSESSMENT — ENCOUNTER SYMPTOMS
BACK PAIN: 0
COLOR CHANGE: 0
EYE REDNESS: 0
CONSTIPATION: 0
ABDOMINAL PAIN: 0
WHEEZING: 0
VOMITING: 0
COUGH: 0
NAUSEA: 0
EYE PAIN: 0
SHORTNESS OF BREATH: 0

## 2019-04-18 NOTE — PROGRESS NOTES
HPI:    Patient is a 80 y.o. male in no acute distress. He is alert and oriented to person, place, and time. History  4/2019 Referral from [facility]. V's for urinary retention and gross hematuria. Patient was admitted to [facility]. V's due to cardiac issues and did subsequently have to cardiac stents placed. While admitted urology was consult today due to gross hematuria and urinary retention after cardiac catheterization. A Barton catheter was placed for >1000ml. urine culture was negative for infection. There was difficulty with catheter placement. He was placed on Flomax daily. Prior to hospital admission he denies daytime frequency, urgency or incontinence. He did have nocturia 2-3 times per night. He denies history of hematuria. He did see Dr. Francis Viramontes in the past for history of stones. He is having daily bowel movements since hospital discharge. Today  Here today to follow-up for urinary retention. He has been taking Flomax daily. He has been urinating without difficulty since the Barton catheter was removed. PVR 58 ML. He does continue to have intermittent hematuria, but denies dysuria. We did complete a CT urogram prior to today's visit due to gross hematuria. This film was independently reviewed and does show punctate nonobstructing left renal stones. There are no ureteral stones or hydronephrosis. There is an area of irregularity in the superior portion of the bladder. Patient is scheduled for cystoscopy on 4/26/2019.     Past Medical History:   Diagnosis Date    Abnormal stress test 2017    Arrhythmia     ASHD (arteriosclerotic heart disease)     CKD (chronic kidney disease)     Closed traumatic fracture of right femur with routine healing 1955    trauma while in [employer], plate and scews placed and removed    COPD (chronic obstructive pulmonary disease) (HCC)     Elevated PSA     Enlarged prostate     Gallstones     Gout     History of kidney stones     Hydrocele in adult     Hypertension     Renal stones     Trigger finger     Vertigo     Wrist fracture, left      Past Surgical History:   Procedure Laterality Date    APPENDECTOMY      20 yo    CARDIAC CATHETERIZATION Left 03/28/2019    [facility]    plate and screws s/p trauma  and taken out in [facility]  9/12/2012    [facility]     [facility] [place]  [facility], 8/26/2004   [facility] ARTHROPLASTY Right 1999     Outpatient Encounter Medications as of 4/18/2019   Medication Sig Dispense Refill    lisinopril (PRINIVIL;ZESTRIL) 5 MG tablet Take 0.5 tablets by mouth daily 90 tablet 1    tamsulosin (FLOMAX) 0.4 MG capsule Take 1 capsule by mouth daily 30 capsule 3    atorvastatin (LIPITOR) 20 MG tablet Take 4 tablets by mouth daily 90 tablet 3    clopidogrel (PLAVIX) 75 MG tablet Take 1 tablet by mouth daily 30 tablet 3    Omega-3 Fatty Acids (FISH OIL) 1000 MG CAPS Take 3,000 mg by mouth daily       amLODIPine (NORVASC) 5 MG tablet Take 1 tablet by mouth daily 90 tablet 3    isosorbide mononitrate (IMDUR) 30 MG extended release tablet Take 1 tablet by mouth daily 90 tablet 3    metoprolol tartrate (LOPRESSOR) 25 MG tablet Take 1 tablet by mouth 2 times daily 180 tablet 0    nitroGLYCERIN (NITROSTAT) 0.4 MG SL tablet Place 1 tablet under the tongue every 5 minutes as needed for Chest pain 25 tablet 0    ferrous sulfate 325 (65 Fe) MG tablet Take 325 mg by mouth daily (with breakfast)      aspirin 81 MG EC tablet Take 81 mg by mouth daily. No facility-administered encounter medications on file as of 4/18/2019.        Current Outpatient Medications on File Prior to Visit   Medication Sig Dispense Refill    lisinopril (PRINIVIL;ZESTRIL) 5 MG tablet Take 0.5 tablets by mouth daily 90 tablet 1    tamsulosin (FLOMAX) 0.4 MG capsule Take 1 capsule by mouth daily 30 capsule 3    atorvastatin (LIPITOR) 20 MG tablet Take 4 tablets by mouth daily 90 tablet 3    clopidogrel (PLAVIX) 75 MG tablet Take 1 tablet by mouth daily 30 tablet 3    Omega-3 Fatty Acids (FISH OIL) 1000 MG CAPS Take 3,000 mg by mouth daily       amLODIPine (NORVASC) 5 MG tablet Take 1 tablet by mouth daily 90 tablet 3    isosorbide mononitrate (IMDUR) 30 MG extended release tablet Take 1 tablet by mouth daily 90 tablet 3    metoprolol tartrate (LOPRESSOR) 25 MG tablet Take 1 tablet by mouth 2 times daily 180 tablet 0    nitroGLYCERIN (NITROSTAT) 0.4 MG SL tablet Place 1 tablet under the tongue every 5 minutes as needed for Chest pain 25 tablet 0    ferrous sulfate 325 (65 Fe) MG tablet Take 325 mg by mouth daily (with breakfast)      aspirin 81 MG EC tablet Take 81 mg by mouth daily. No current facility-administered medications on file prior to visit. Penicillins  Family History   Problem Relation Age of Onset    Heart Disease Mother     Hypertension Mother     Heart Attack Mother     Parkinsonism Father     Heart Attack Father     Heart Disease Father     Kidney Disease Brother         dialysis    Arrhythmia Sister     No Known Problems Brother      Social History     Tobacco Use   Smoking Status Former Smoker    Packs/day: 1.00    Years: 25.00    Pack years: 25.00    Last attempt to quit: 1974    Years since quittin.2   Smokeless Tobacco Never Used       Social History     Substance and Sexual Activity   Alcohol Use No    Comment: quit 38 years ago. Review of Systems   Constitutional: Negative for appetite change, chills and fever. Eyes: Negative for pain, redness and visual disturbance. Respiratory: Negative for cough, shortness of breath and wheezing. Cardiovascular: Negative for chest pain and leg swelling. Gastrointestinal: Negative for abdominal pain, constipation, nausea and vomiting.    Genitourinary: Negative for decreased urine volume, difficulty urinating, discharge, dysuria, enuresis, flank pain, frequency, hematuria, penile pain, scrotal swelling, testicular pain and urgency. Musculoskeletal: Negative for back pain, joint swelling and myalgias. Skin: Negative for color change, rash and wound. Neurological: Negative for dizziness, tremors and numbness. Hematological: Negative for adenopathy. Does not bruise/bleed easily. BP (!) 118/58   Ht 5' 8\" (1.727 m)   Wt 180 lb (81.6 kg)   BMI 27.37 kg/m²       PHYSICAL EXAM:  Constitutional: Patient in no acute distress; Neuro: alert and oriented to person place and time. Psych: Mood and affect normal.  Skin: Normal  Lungs: Respiratory effort normal  Cardiovascular:  Normal peripheral pulses  Abdomen: Soft, non-tender, non-distended with no CVA, flank pain, hepatosplenomegaly or hernia. Kidneys normal.  Bladder non-tender and not distended. Lymphatics: no palpable lymphadenopathy  Penis normal  Urethral meatus normal  Scrotal exam normal  Testicles normal bilaterally  Epididymis normal bilaterally  No evidence of inguinal hernia        Lab Results   Component Value Date    BUN 15 04/01/2019     Lab Results   Component Value Date    CREATININE 1.05 04/01/2019     Lab Results   Component Value Date    PSA 9.27 (H) 07/18/2012       ASSESSMENT:   Diagnosis Orders   1. Gross hematuria  CT MEASUREMENT,POST-VOID RESIDUAL VOLUME BY US,NON-IMAGING    Urinalysis with Microscopic    Urine culture clean catch   2. BPH with obstruction/lower urinary tract symptoms  CT MEASUREMENT,POST-VOID RESIDUAL VOLUME BY US,NON-IMAGING    Urinalysis with Microscopic    Urine culture clean catch   3. Urinary retention  CT MEASUREMENT,POST-VOID RESIDUAL VOLUME BY US,NON-IMAGING    Urinalysis with Microscopic    Urine culture clean catch           PLAN:  We will check a UA C&S and preparation for cystoscopy. Patient will keep cystoscopy schedule as planned on 4/26/19.

## 2019-04-19 LAB
CULTURE: ABNORMAL
Lab: ABNORMAL
SPECIMEN DESCRIPTION: ABNORMAL

## 2019-04-22 ENCOUNTER — TELEPHONE (OUTPATIENT)
Dept: UROLOGY | Age: 84
End: 2019-04-22

## 2019-04-22 RX ORDER — CIPROFLOXACIN 500 MG/1
500 TABLET, FILM COATED ORAL 2 TIMES DAILY
Qty: 14 TABLET | Refills: 0 | Status: SHIPPED | OUTPATIENT
Start: 2019-04-22 | End: 2019-04-29

## 2019-04-22 NOTE — TELEPHONE ENCOUNTER
UACS is positive for infection. I sent in culture specific cipro to treat this infection. Take this antibiotic until gone. Take this with food and eat yogurt once per day to prevent GI upset. If you develop nausea, vomiting, or fevers call the office or go to the ER. Keep cystoscopy as planned.

## 2019-04-22 NOTE — TELEPHONE ENCOUNTER
Patient made aware of positive urine culture results and to start antibiotics.  Patient voiced understanding

## 2019-04-26 ENCOUNTER — PROCEDURE VISIT (OUTPATIENT)
Dept: UROLOGY | Age: 84
End: 2019-04-26
Payer: MEDICARE

## 2019-04-26 VITALS
WEIGHT: 179 LBS | DIASTOLIC BLOOD PRESSURE: 70 MMHG | BODY MASS INDEX: 27.13 KG/M2 | HEIGHT: 68 IN | SYSTOLIC BLOOD PRESSURE: 140 MMHG

## 2019-04-26 DIAGNOSIS — R33.9 URINARY RETENTION: ICD-10-CM

## 2019-04-26 DIAGNOSIS — N13.8 BPH WITH OBSTRUCTION/LOWER URINARY TRACT SYMPTOMS: Primary | ICD-10-CM

## 2019-04-26 DIAGNOSIS — R31.0 GROSS HEMATURIA: ICD-10-CM

## 2019-04-26 DIAGNOSIS — N40.1 BPH WITH OBSTRUCTION/LOWER URINARY TRACT SYMPTOMS: Primary | ICD-10-CM

## 2019-04-26 PROCEDURE — G8598 ASA/ANTIPLAT THER USED: HCPCS | Performed by: UROLOGY

## 2019-04-26 PROCEDURE — 4040F PNEUMOC VAC/ADMIN/RCVD: CPT | Performed by: UROLOGY

## 2019-04-26 PROCEDURE — 99213 OFFICE O/P EST LOW 20 MIN: CPT | Performed by: UROLOGY

## 2019-04-26 PROCEDURE — 1036F TOBACCO NON-USER: CPT | Performed by: UROLOGY

## 2019-04-26 PROCEDURE — 1111F DSCHRG MED/CURRENT MED MERGE: CPT | Performed by: UROLOGY

## 2019-04-26 PROCEDURE — 1123F ACP DISCUSS/DSCN MKR DOCD: CPT | Performed by: UROLOGY

## 2019-04-26 PROCEDURE — G8427 DOCREV CUR MEDS BY ELIG CLIN: HCPCS | Performed by: UROLOGY

## 2019-04-26 PROCEDURE — G8419 CALC BMI OUT NRM PARAM NOF/U: HCPCS | Performed by: UROLOGY

## 2019-04-26 PROCEDURE — 52000 CYSTOURETHROSCOPY: CPT | Performed by: UROLOGY

## 2019-04-26 ASSESSMENT — ENCOUNTER SYMPTOMS
ABDOMINAL PAIN: 0
COUGH: 0
BACK PAIN: 0
SHORTNESS OF BREATH: 0
EYE REDNESS: 0
EYE PAIN: 0
VOMITING: 0
COLOR CHANGE: 0
WHEEZING: 0
NAUSEA: 0

## 2019-04-26 NOTE — PROGRESS NOTES
HPI:    Patient is a 80 y.o. male in no acute distress. He is alert and oriented to person, place, and time. History  4/2019 Referral from [facility]. V's for urinary retention and gross hematuria. Patient was admitted to [facility]. V's due to cardiac issues and did subsequently have to cardiac stents placed. While admitted urology was consult today due to gross hematuria and urinary retention after cardiac catheterization. A Barton catheter was placed for >1000ml. urine culture was negative for infection. There was difficulty with catheter placement. He was placed on Flomax daily. Prior to hospital admission he denies daytime frequency, urgency or incontinence. He did have nocturia 2-3 times per night. He denies history of hematuria. He did see Dr. Valdo Wilder in the past for history of stones. He is having daily bowel movements since hospital discharge. Currently  Patient is here today for lower tract visualization. Patient is taking Flomax. He does report an adequate stream.  Patient is had minimal gross hematuria. He reports no dysuria. Patient is having no side effects in medication. Patient has no flank pain nausea or vomiting. Cystoscopy Procedure Note    Pre-operative Diagnosis: gross hematuria    Post-operative Diagnosis: Same     Surgeon: Janet Ambriz     Assistants: None    Anesthesia : Local    Procedure Details   The risks, benefits, complications, treatment options, and expected outcomes were discussed with the patient. The patient concurred with the proposed plan, giving informed consent. Cystoscopy was performed today under local anesthesia, using sterile technique. The patient was placed in the dorsal lithotomy position, prepped with CHG, and draped in the usual sterile fashion. A 14 Khmer flexible cystoscope was used to systematically inspect both the urethra and bladder in their entirety.     Findings:  Anterior urethra: normal without strictures  Hyperplasia: bilobar  Bladder: Mild trabeculation, without lesions. Ureteral orifice(s) was/were seen in the normal position and effluxing clear urine  Trabeculations No  Diverticulum No  Description: Prostatic varices         Specimens: none                 Complications:  None; patient tolerated the procedure well.            Disposition: home           Condition: stable        Past Medical History:   Diagnosis Date    Abnormal stress test 2017    Arrhythmia     ASHD (arteriosclerotic heart disease)     CKD (chronic kidney disease)     Closed traumatic fracture of right femur with routine healing 1955    trauma while in [employer], plate and scews placed and removed    COPD (chronic obstructive pulmonary disease) (HCC)     Elevated PSA     Enlarged prostate     Gallstones     Gout     History of kidney stones     Hydrocele in adult     Hypertension     Renal stones     Trigger finger     Vertigo     Wrist fracture, left      Past Surgical History:   Procedure Laterality Date    APPENDECTOMY      22 yo   [address] Left 03/28/2019    [facility]    plate and screws s/p trauma  and taken out in [facility]  9/12/2012    LITHOTRIPSY     [address]  10/22/1998, 8/26/2004   [address] [facility] ARTHROPLASTY Right 1999     Outpatient Encounter Medications as of 4/26/2019   Medication Sig Dispense Refill    ciprofloxacin (CIPRO) 500 MG tablet Take 1 tablet by mouth 2 times daily for 7 days 14 tablet 0    lisinopril (PRINIVIL;ZESTRIL) 5 MG tablet Take 0.5 tablets by mouth daily 90 tablet 1    tamsulosin (FLOMAX) 0.4 MG capsule Take 1 capsule by mouth daily 30 capsule 3    atorvastatin (LIPITOR) 20 MG tablet Take 4 tablets by mouth daily 90 tablet 3    clopidogrel (PLAVIX) 75 MG tablet Take 1 tablet by mouth daily 30 tablet 3    Omega-3 Fatty Acids (FISH OIL) 1000 MG CAPS Take 3,000 mg by mouth daily       amLODIPine (NORVASC) 5 MG tablet Take 1 tablet by mouth daily 90 tablet 3    isosorbide mononitrate (IMDUR) 30 MG extended release tablet Take 1 tablet by mouth daily 90 tablet 3    metoprolol tartrate (LOPRESSOR) 25 MG tablet Take 1 tablet by mouth 2 times daily 180 tablet 0    nitroGLYCERIN (NITROSTAT) 0.4 MG SL tablet Place 1 tablet under the tongue every 5 minutes as needed for Chest pain 25 tablet 0    ferrous sulfate 325 (65 Fe) MG tablet Take 325 mg by mouth daily (with breakfast)      aspirin 81 MG EC tablet Take 81 mg by mouth daily. No facility-administered encounter medications on file as of 4/26/2019. Current Outpatient Medications on File Prior to Visit   Medication Sig Dispense Refill    ciprofloxacin (CIPRO) 500 MG tablet Take 1 tablet by mouth 2 times daily for 7 days 14 tablet 0    lisinopril (PRINIVIL;ZESTRIL) 5 MG tablet Take 0.5 tablets by mouth daily 90 tablet 1    tamsulosin (FLOMAX) 0.4 MG capsule Take 1 capsule by mouth daily 30 capsule 3    atorvastatin (LIPITOR) 20 MG tablet Take 4 tablets by mouth daily 90 tablet 3    clopidogrel (PLAVIX) 75 MG tablet Take 1 tablet by mouth daily 30 tablet 3    Omega-3 Fatty Acids (FISH OIL) 1000 MG CAPS Take 3,000 mg by mouth daily       amLODIPine (NORVASC) 5 MG tablet Take 1 tablet by mouth daily 90 tablet 3    isosorbide mononitrate (IMDUR) 30 MG extended release tablet Take 1 tablet by mouth daily 90 tablet 3    metoprolol tartrate (LOPRESSOR) 25 MG tablet Take 1 tablet by mouth 2 times daily 180 tablet 0    nitroGLYCERIN (NITROSTAT) 0.4 MG SL tablet Place 1 tablet under the tongue every 5 minutes as needed for Chest pain 25 tablet 0    ferrous sulfate 325 (65 Fe) MG tablet Take 325 mg by mouth daily (with breakfast)      aspirin 81 MG EC tablet Take 81 mg by mouth daily. No current facility-administered medications on file prior to visit.       Penicillins  Family History   Problem Relation Age of Onset    Heart Disease Mother     Hypertension Mother     Heart Attack Mother     Parkinsonism Father     Heart Attack Father     Heart Disease Father     Kidney Disease Brother         dialysis    Arrhythmia Sister     No Known Problems Brother      Social History     Tobacco Use   Smoking Status Former Smoker    Packs/day: 1.00    Years: 25.00    Pack years: 25.00    Last attempt to quit: 1974    Years since quittin.2   Smokeless Tobacco Never Used       Social History     Substance and Sexual Activity   Alcohol Use No    Comment: quit 38 years ago. Review of Systems   Constitutional: Negative for appetite change, chills and fever. Eyes: Negative for pain, redness and visual disturbance. Respiratory: Negative for cough, shortness of breath and wheezing. Cardiovascular: Negative for chest pain and leg swelling. Gastrointestinal: Negative for abdominal pain, nausea and vomiting. Genitourinary: Positive for hematuria. Negative for difficulty urinating, discharge, dysuria, flank pain, frequency, scrotal swelling and testicular pain. Musculoskeletal: Negative for back pain, joint swelling and myalgias. Skin: Negative for color change, rash and wound. Neurological: Negative for dizziness, tremors and numbness. Hematological: Negative for adenopathy. Does not bruise/bleed easily. BP (!) 140/70 (Site: Left Upper Arm, Position: Sitting, Cuff Size: Medium Adult)   Ht 5' 8\" (1.727 m)   Wt 179 lb (81.2 kg)   BMI 27.22 kg/m²       PHYSICAL EXAM:  Constitutional: Patient in no acute distress; Neuro: alert and oriented to person place and time. Psych: Mood and affect normal.  Skin: Normal  Lungs: Respiratory effort normal  Cardiovascular:  Normal peripheral pulses  Abdomen: Soft, non-tender, non-distended with no CVA, flank pain, hepatosplenomegaly or hernia. Kidneys normal.  Bladder non-tender and not distended.   Lymphatics: no palpable

## 2019-05-23 ENCOUNTER — TELEPHONE (OUTPATIENT)
Dept: CARDIOLOGY | Age: 84
End: 2019-05-23

## 2019-05-23 DIAGNOSIS — Z95.820 S/P ANGIOPLASTY WITH STENT: Primary | ICD-10-CM

## 2019-05-23 NOTE — TELEPHONE ENCOUNTER
Patient had an appointment with  Eden Stephen today.  She is recommending that the patient start cardiac rehab but Bishop Deluca would like to know your thoughts first?

## 2019-05-30 ENCOUNTER — APPOINTMENT (OUTPATIENT)
Dept: GENERAL RADIOLOGY | Age: 84
End: 2019-05-30
Payer: MEDICARE

## 2019-05-30 ENCOUNTER — APPOINTMENT (OUTPATIENT)
Dept: CT IMAGING | Age: 84
End: 2019-05-30
Payer: MEDICARE

## 2019-05-30 ENCOUNTER — HOSPITAL ENCOUNTER (EMERGENCY)
Age: 84
Discharge: HOME | End: 2019-05-30
Attending: EMERGENCY MEDICINE
Payer: MEDICARE

## 2019-05-30 ENCOUNTER — HOSPITAL ENCOUNTER (OUTPATIENT)
Dept: CARDIAC REHAB | Age: 84
Setting detail: THERAPIES SERIES
Discharge: HOME | End: 2019-05-30
Payer: MEDICARE

## 2019-05-30 VITALS
HEIGHT: 68 IN | DIASTOLIC BLOOD PRESSURE: 73 MMHG | RESPIRATION RATE: 16 BRPM | SYSTOLIC BLOOD PRESSURE: 140 MMHG | HEART RATE: 60 BPM | TEMPERATURE: 97.4 F | OXYGEN SATURATION: 98 % | BODY MASS INDEX: 26.52 KG/M2 | WEIGHT: 175 LBS

## 2019-05-30 VITALS
RESPIRATION RATE: 16 BRPM | WEIGHT: 175 LBS | SYSTOLIC BLOOD PRESSURE: 116 MMHG | DIASTOLIC BLOOD PRESSURE: 56 MMHG | HEART RATE: 64 BPM | BODY MASS INDEX: 26.52 KG/M2 | HEIGHT: 68 IN

## 2019-05-30 DIAGNOSIS — R53.1 GENERAL WEAKNESS: ICD-10-CM

## 2019-05-30 DIAGNOSIS — R42 DIZZINESS: ICD-10-CM

## 2019-05-30 DIAGNOSIS — N30.00 ACUTE CYSTITIS WITHOUT HEMATURIA: Primary | ICD-10-CM

## 2019-05-30 LAB
-: ABNORMAL
ABSOLUTE EOS #: 0.23 K/UL (ref 0–0.44)
ABSOLUTE IMMATURE GRANULOCYTE: 0.03 K/UL (ref 0–0.3)
ABSOLUTE LYMPH #: 1.44 K/UL (ref 1.1–3.7)
ABSOLUTE MONO #: 0.6 K/UL (ref 0.1–1.2)
ALBUMIN SERPL-MCNC: 3.9 G/DL (ref 3.5–5.2)
ALBUMIN/GLOBULIN RATIO: 1.1 (ref 1–2.5)
ALP BLD-CCNC: 97 U/L (ref 40–129)
ALT SERPL-CCNC: 11 U/L (ref 5–41)
AMORPHOUS: ABNORMAL
ANION GAP SERPL CALCULATED.3IONS-SCNC: 12 MMOL/L (ref 9–17)
AST SERPL-CCNC: 18 U/L
BACTERIA: ABNORMAL
BASOPHILS # BLD: 0 % (ref 0–2)
BASOPHILS ABSOLUTE: 0.03 K/UL (ref 0–0.2)
BILIRUB SERPL-MCNC: 0.52 MG/DL (ref 0.3–1.2)
BILIRUBIN URINE: NEGATIVE
BUN BLDV-MCNC: 24 MG/DL (ref 8–23)
BUN/CREAT BLD: 18 (ref 9–20)
CALCIUM SERPL-MCNC: 9.7 MG/DL (ref 8.6–10.4)
CASTS UA: ABNORMAL /LPF
CHLORIDE BLD-SCNC: 101 MMOL/L (ref 98–107)
CO2: 26 MMOL/L (ref 20–31)
COLOR: YELLOW
COMMENT UA: ABNORMAL
CREAT SERPL-MCNC: 1.35 MG/DL (ref 0.7–1.2)
CRYSTALS, UA: ABNORMAL /HPF
DIFFERENTIAL TYPE: ABNORMAL
EOSINOPHILS RELATIVE PERCENT: 3 % (ref 1–4)
EPITHELIAL CELLS UA: ABNORMAL /HPF (ref 0–5)
GFR AFRICAN AMERICAN: >60 ML/MIN
GFR NON-AFRICAN AMERICAN: 50 ML/MIN
GFR SERPL CREATININE-BSD FRML MDRD: ABNORMAL ML/MIN/{1.73_M2}
GFR SERPL CREATININE-BSD FRML MDRD: ABNORMAL ML/MIN/{1.73_M2}
GLUCOSE BLD-MCNC: 109 MG/DL (ref 70–99)
GLUCOSE URINE: NEGATIVE
HCT VFR BLD CALC: 40.7 % (ref 40.7–50.3)
HEMOGLOBIN: 12.6 G/DL (ref 13–17)
IMMATURE GRANULOCYTES: 0 %
KETONES, URINE: NEGATIVE
LEUKOCYTE ESTERASE, URINE: ABNORMAL
LYMPHOCYTES # BLD: 19 % (ref 24–43)
MCH RBC QN AUTO: 26.2 PG (ref 25.2–33.5)
MCHC RBC AUTO-ENTMCNC: 31 G/DL (ref 28.4–34.8)
MCV RBC AUTO: 84.6 FL (ref 82.6–102.9)
MONOCYTES # BLD: 8 % (ref 3–12)
MUCUS: ABNORMAL
NITRITE, URINE: NEGATIVE
NRBC AUTOMATED: 0 PER 100 WBC
OTHER OBSERVATIONS UA: ABNORMAL
PDW BLD-RTO: 13.7 % (ref 11.8–14.4)
PH UA: 6 (ref 5–9)
PLATELET # BLD: 232 K/UL (ref 138–453)
PLATELET ESTIMATE: ABNORMAL
PMV BLD AUTO: 9.6 FL (ref 8.1–13.5)
POTASSIUM SERPL-SCNC: 4.2 MMOL/L (ref 3.7–5.3)
PROTEIN UA: NEGATIVE
RBC # BLD: 4.81 M/UL (ref 4.21–5.77)
RBC # BLD: ABNORMAL 10*6/UL
RBC UA: ABNORMAL /HPF (ref 0–2)
RENAL EPITHELIAL, UA: ABNORMAL /HPF
SEG NEUTROPHILS: 70 % (ref 36–65)
SEGMENTED NEUTROPHILS ABSOLUTE COUNT: 5.26 K/UL (ref 1.5–8.1)
SODIUM BLD-SCNC: 139 MMOL/L (ref 135–144)
SPECIFIC GRAVITY UA: 1.02 (ref 1.01–1.02)
TOTAL PROTEIN: 7.3 G/DL (ref 6.4–8.3)
TRICHOMONAS: ABNORMAL
TROPONIN INTERP: NORMAL
TROPONIN T: <0.03 NG/ML
TROPONIN, HIGH SENSITIVITY: NORMAL NG/L (ref 0–22)
TURBIDITY: CLEAR
URINE HGB: NEGATIVE
UROBILINOGEN, URINE: NORMAL
WBC # BLD: 7.6 K/UL (ref 3.5–11.3)
WBC # BLD: ABNORMAL 10*3/UL
WBC UA: ABNORMAL /HPF (ref 0–5)
YEAST: ABNORMAL

## 2019-05-30 PROCEDURE — 99284 EMERGENCY DEPT VISIT MOD MDM: CPT

## 2019-05-30 PROCEDURE — 2580000003 HC RX 258: Performed by: EMERGENCY MEDICINE

## 2019-05-30 PROCEDURE — 6360000002 HC RX W HCPCS: Performed by: EMERGENCY MEDICINE

## 2019-05-30 PROCEDURE — 6370000000 HC RX 637 (ALT 250 FOR IP): Performed by: EMERGENCY MEDICINE

## 2019-05-30 PROCEDURE — 85025 COMPLETE CBC W/AUTO DIFF WBC: CPT

## 2019-05-30 PROCEDURE — 80053 COMPREHEN METABOLIC PANEL: CPT

## 2019-05-30 PROCEDURE — 84484 ASSAY OF TROPONIN QUANT: CPT

## 2019-05-30 PROCEDURE — 96374 THER/PROPH/DIAG INJ IV PUSH: CPT

## 2019-05-30 PROCEDURE — 70450 CT HEAD/BRAIN W/O DYE: CPT

## 2019-05-30 PROCEDURE — 93005 ELECTROCARDIOGRAM TRACING: CPT | Performed by: EMERGENCY MEDICINE

## 2019-05-30 PROCEDURE — 96375 TX/PRO/DX INJ NEW DRUG ADDON: CPT

## 2019-05-30 PROCEDURE — 81001 URINALYSIS AUTO W/SCOPE: CPT

## 2019-05-30 PROCEDURE — 71046 X-RAY EXAM CHEST 2 VIEWS: CPT

## 2019-05-30 RX ORDER — 0.9 % SODIUM CHLORIDE 0.9 %
500 INTRAVENOUS SOLUTION INTRAVENOUS ONCE
Status: COMPLETED | OUTPATIENT
Start: 2019-05-30 | End: 2019-05-30

## 2019-05-30 RX ORDER — METOCLOPRAMIDE HYDROCHLORIDE 5 MG/ML
10 INJECTION INTRAMUSCULAR; INTRAVENOUS ONCE
Status: COMPLETED | OUTPATIENT
Start: 2019-05-30 | End: 2019-05-30

## 2019-05-30 RX ORDER — SULFAMETHOXAZOLE AND TRIMETHOPRIM 800; 160 MG/1; MG/1
1 TABLET ORAL ONCE
Status: COMPLETED | OUTPATIENT
Start: 2019-05-30 | End: 2019-05-30

## 2019-05-30 RX ORDER — DIPHENHYDRAMINE HYDROCHLORIDE 50 MG/ML
12.5 INJECTION INTRAMUSCULAR; INTRAVENOUS ONCE
Status: COMPLETED | OUTPATIENT
Start: 2019-05-30 | End: 2019-05-30

## 2019-05-30 RX ORDER — SULFAMETHOXAZOLE AND TRIMETHOPRIM 800; 160 MG/1; MG/1
1 TABLET ORAL 2 TIMES DAILY
Qty: 20 TABLET | Refills: 0 | Status: SHIPPED | OUTPATIENT
Start: 2019-05-30 | End: 2019-06-09

## 2019-05-30 RX ORDER — KETOROLAC TROMETHAMINE 15 MG/ML
15 INJECTION, SOLUTION INTRAMUSCULAR; INTRAVENOUS ONCE
Status: COMPLETED | OUTPATIENT
Start: 2019-05-30 | End: 2019-05-30

## 2019-05-30 RX ORDER — DEXAMETHASONE SODIUM PHOSPHATE 10 MG/ML
10 INJECTION INTRAMUSCULAR; INTRAVENOUS ONCE
Status: COMPLETED | OUTPATIENT
Start: 2019-05-30 | End: 2019-05-30

## 2019-05-30 RX ADMIN — DIPHENHYDRAMINE HYDROCHLORIDE 12.5 MG: 50 INJECTION, SOLUTION INTRAMUSCULAR; INTRAVENOUS at 12:42

## 2019-05-30 RX ADMIN — METOCLOPRAMIDE 10 MG: 5 INJECTION, SOLUTION INTRAMUSCULAR; INTRAVENOUS at 12:42

## 2019-05-30 RX ADMIN — SULFAMETHOXAZOLE AND TRIMETHOPRIM 1 TABLET: 800; 160 TABLET ORAL at 12:42

## 2019-05-30 RX ADMIN — KETOROLAC TROMETHAMINE 15 MG: 15 INJECTION, SOLUTION INTRAMUSCULAR; INTRAVENOUS at 12:42

## 2019-05-30 RX ADMIN — SODIUM CHLORIDE 500 ML: 9 INJECTION, SOLUTION INTRAVENOUS at 12:03

## 2019-05-30 RX ADMIN — DEXAMETHASONE SODIUM PHOSPHATE 10 MG: 10 INJECTION INTRAMUSCULAR; INTRAVENOUS at 12:50

## 2019-05-30 ASSESSMENT — PAIN SCALES - GENERAL
PAINLEVEL_OUTOF10: 3
PAINLEVEL_OUTOF10: 0

## 2019-05-30 ASSESSMENT — ENCOUNTER SYMPTOMS
COUGH: 0
SHORTNESS OF BREATH: 0
SINUS PRESSURE: 1
ABDOMINAL PAIN: 0
NAUSEA: 0
COLOR CHANGE: 0
PHOTOPHOBIA: 0
VOMITING: 0
SORE THROAT: 0

## 2019-05-30 ASSESSMENT — PATIENT HEALTH QUESTIONNAIRE - PHQ9: SUM OF ALL RESPONSES TO PHQ QUESTIONS 1-9: 1

## 2019-05-30 NOTE — ED PROVIDER NOTES
[facility] ED  eMERGENCY dEPARTMENT eNCOUnter      Pt Name: Cynthia Pagan  MRN: [id number]  [place] 1935  Date of evaluation: 5/30/2019  Provider: [address]       Chief Complaint   Patient presents with    Fatigue     Went to cardiac rehab for initial eval s/p stents and complained to nurse that he has been feeling dizzy and having headaches. States had near syncopal episode at [facility]. HISTORY OF PRESENT ILLNESS   (Location/Symptom, Timing/Onset, Context/Setting, Quality, Duration, Modifying Factors, Severity) Note limiting factors. HPI    Cynthia Pagan is a 80 y.o. male who presents to the emergency department with complaint of fatigue headache and dizziness. Patient reports for the past 2 weeks he said mild fatigue and headache. He states that today he went for his first episode of cardiac rehab. He reports when he told them of his fatigue over the past 2 weeks with mild headache he was sent to the ER for evaluation. Patient denies any chest pain or shortness of breath he denies any trauma prior to headache beginning he denies any abdominal pain nausea or vomiting      Nursing Notes were reviewed. REVIEW OF SYSTEMS    (2+ for level 4; 10+ for level 5)   Review of Systems   Constitutional: Positive for fatigue. Negative for chills and fever. HENT: Positive for congestion and sinus pressure. Negative for sore throat. Eyes: Negative for photophobia and visual disturbance. Respiratory: Negative for cough and shortness of breath. Cardiovascular: Negative for chest pain and palpitations. Gastrointestinal: Negative for abdominal pain, nausea and vomiting. Genitourinary: Negative for dysuria. Musculoskeletal: Negative for myalgias. Skin: Negative for color change and rash. Neurological: Positive for dizziness, light-headedness and headaches. All other systems reviewed and are negative.       PAST MEDICAL HISTORY     Past Medical TAMSULOSIN (FLOMAX) 0.4 MG CAPSULE    Take 1 capsule by mouth daily       ALLERGIES     Penicillins    FAMILY HISTORY       Family History   Problem Relation Age of Onset    Heart Disease Mother     Hypertension Mother     Heart Attack Mother     Parkinsonism Father     Heart Attack Father     Heart Disease Father     Kidney Disease Brother         dialysis    Arrhythmia Sister     No Known Problems Brother         SOCIAL HISTORY       Social History     Socioeconomic History    Marital status:      Spouse name: None    Number of children: None    Years of education: None    Highest education level: None   Occupational History    None   Social Needs    Financial resource strain: None    Food insecurity:     Worry: None     Inability: None    Transportation needs:     Medical: None     Non-medical: None   Tobacco Use    Smoking status: Former Smoker     Packs/day: 1.00     Years: 25.00     Pack years: 25.00     Last attempt to quit: 1974     Years since quittin.3    Smokeless tobacco: Never Used   Substance and Sexual Activity    Alcohol use: No     Comment: quit 38 years ago.     Drug use: No    Sexual activity: Yes     Partners: Female   Lifestyle    Physical activity:     Days per week: None     Minutes per session: None    Stress: None   Relationships    Social connections:     Talks on phone: None     Gets together: None     Attends Restorationist service: None     Active member of club or organization: None     Attends meetings of clubs or organizations: None     Relationship status: None    Intimate partner violence:     Fear of current or ex partner: None     Emotionally abused: None     Physically abused: None     Forced sexual activity: None   Other Topics Concern    None   Social History Narrative    None       SCREENINGS    Carlie Coma Scale  Eye Opening: Spontaneous  Best Verbal Response: Oriented  Best Motor Response: Obeys commands  Carlie Coma Scale Score: 15 PHYSICAL EXAM    (up to 7 for level 4, 8 or more for level 5)   @EDTRIAGEVSS    Physical Exam   Constitutional: He is oriented to person, place, and time. He appears well-developed and well-nourished. No distress. HENT:   Head: Normocephalic and atraumatic. Mouth/Throat: No oropharyngeal exudate. Bilateral TMs are retracted without obvious changes concerning for infection. Nasal mucosa is hyperemic and boggy. Posterior pharynx displays cobblestoning consistent with sinus drainage there is no airway edema or compromise. There is mild pain and palpation of The bilateral maxillary sinuses   Eyes: Pupils are equal, round, and reactive to light. Conjunctivae and EOM are normal. Right eye exhibits no discharge. Left eye exhibits no discharge. No scleral icterus. Neck: Normal range of motion. Neck supple. No meningeal signs   Cardiovascular: Normal rate, regular rhythm, normal heart sounds and intact distal pulses. Exam reveals no gallop and no friction rub. No murmur heard. Radial pulses are +2 out of 4 bilaterally are equal and symmetric   Pulmonary/Chest: Effort normal and breath sounds normal. No stridor. No respiratory distress. He has no wheezes. He exhibits no tenderness. Abdominal: Soft. Bowel sounds are normal. He exhibits no distension. There is no tenderness. There is no guarding. No voluntary guarding or rigidity no pulsatile mass   Musculoskeletal: Normal range of motion. He exhibits no edema, tenderness or deformity. Lymphadenopathy:     He has cervical adenopathy. Neurological: He is alert and oriented to person, place, and time. No cranial nerve deficit or sensory deficit. Cranial nerves II through XII are grossly intact no focal neurologic deficits. No pronator drift no dysmetria no truncal ataxia. No nystagmus noted. NIH stroke scale score 0   Skin: Skin is warm and dry. Capillary refill takes less than 2 seconds. No rash noted. He is not diaphoretic. No erythema.  No pallor. Mild increase in skin turgor   Psychiatric: He has a normal mood and affect. His behavior is normal. Judgment and thought content normal.   Nursing note and vitals reviewed. DIAGNOSTIC RESULTS     EKG (Per Emergency Physician):   EKG shows sinus rhythm at a rate of 61 with an occasional ectopic beat/PVC noted no acute current of injury or dysrhythmia change QTC is 400    RADIOLOGY (Per Emergency Physician): Interpretation per the Radiologist below, if available at the time of this note:  Xr Chest Standard (2 Vw)    Result Date: 5/30/2019  EXAMINATION: TWO XRAY VIEWS OF THE CHEST 5/30/2019 11:28 am COMPARISON: 31 October 2017 HISTORY: ORDERING SYSTEM PROVIDED HISTORY: dizziness TECHNOLOGIST PROVIDED HISTORY: dizziness FINDINGS: Overlying cardiac monitoring electrodes are present. Heart size is normal. Aorta is ectatic and mildly calcified. No cardiomegaly, vascular congestion, focal consolidation, effusion, or pneumothorax is noted. Osseous and mediastinal structures are age-appropriate     No acute cardiopulmonary findings. Ct Head Wo Contrast    Result Date: 5/30/2019  EXAMINATION: CT OF THE HEAD WITHOUT CONTRAST  5/30/2019 11:32 am TECHNIQUE: CT of the head was performed without the administration of intravenous contrast. Dose modulation, iterative reconstruction, and/or weight based adjustment of the mA/kV was utilized to reduce the radiation dose to as low as reasonably achievable. COMPARISON: CT head June 8, 2018 HISTORY: ORDERING SYSTEM PROVIDED HISTORY: dizziness TECHNOLOGIST PROVIDED HISTORY: FINDINGS: BRAIN/VENTRICLES: There is mild parenchymal volume loss. There is periventricular white matter low attenuation, likely related to mild chronic microvascular disease. There is no acute intracranial hemorrhage, mass effect or midline shift. No abnormal extra-axial fluid collection. There are small old lacunar infarcts in the left basal ganglia, right central lizet.  There is no acute territorial infarction. There is no hydrocephalus. ORBITS: The visualized portion of the orbits demonstrate no acute abnormality. SINUSES: The visualized paranasal sinuses and mastoid air cells demonstrate no acute abnormality. SOFT TISSUES/SKULL:  No acute abnormality of the visualized skull or soft tissues. No acute intracranial abnormality. Small old lacunar infarcts in the left basal ganglia and right central lizet. Mild parenchymal volume loss. Mild chronic microvascular disease. ED BEDSIDE ULTRASOUND:   Performed by ED Physician - none    LABS:  Labs Reviewed   CBC WITH AUTO DIFFERENTIAL - Abnormal; Notable for the following components:       Result Value    Hemoglobin 12.6 (*)     Seg Neutrophils 70 (*)     Lymphocytes 19 (*)     All other components within normal limits   COMPREHENSIVE METABOLIC PANEL - Abnormal; Notable for the following components:    Glucose 109 (*)     BUN 24 (*)     CREATININE 1.35 (*)     GFR Non- 50 (*)     All other components within normal limits   URINALYSIS - Abnormal; Notable for the following components:    Leukocyte Esterase, Urine SMALL (*)     All other components within normal limits   MICROSCOPIC URINALYSIS - Abnormal; Notable for the following components:    Mucus, UA 2+ (*)     All other components within normal limits   TROPONIN        All other labs were within normal range or not returned as of this dictation.     EMERGENCY DEPARTMENT COURSE and DIFFERENTIAL DIAGNOSIS/MDM:   Vitals:    Vitals:    05/30/19 1036 05/30/19 1046   BP: (!) 141/79    Pulse: 67    Resp: 18    Temp:  97.4 °F (36.3 °C)   TempSrc:  Tympanic   SpO2: 98%    Weight: 175 lb (79.4 kg)    Height: 5' 8\" (1.727 m)        Medications   ketorolac (TORADOL) injection 15 mg (has no administration in time range)   metoclopramide (REGLAN) injection 10 mg (has no administration in time range)   diphenhydrAMINE (BENADRYL) injection 12.5 mg (has no administration in time range) dexamethasone (DECADRON) injection 10 mg (has no administration in time range)   sulfamethoxazole-trimethoprim (BACTRIM DS;SEPTRA DS) 800-160 MG per tablet 1 tablet (has no administration in time range)   0.9 % sodium chloride IV bolus 500 mL (500 mLs Intravenous New Bag 5/30/19 1203)       MDM. Patient arrived in no acute distress. Neuro exam was normal but with history of headache and dizziness basic labs and a CT scan were obtained. Labs showed mild elevation to his kidney function but otherwise stable electrolytes. Urine had slight changes consistent with infection. Head CT showed chronic age-related changes but no acute findings. Chest x-ray was clear. On reevaluation patient remained in stable condition with a normal neurologic exam.  At this time I feel his complaint of \"fuzziness\" and headache is most likely related to the mild urinary tract infection. Therefore he'll be started on antibiotics but as he has no changes concerning for sepsis will be discharged home    REVAL:         Lobito Samson 124 time was minutes, excluding separately reportable procedures. There was a high probability of clinically significant/life threatening deterioration in the patient's condition which required my urgent intervention. CONSULTS:  None    PROCEDURES:  Unless otherwise noted below, none     Procedures    FINAL IMPRESSION      1. Acute cystitis without hematuria    2. General weakness    3.  Dizziness          DISPOSITION/PLAN   DISPOSITION        PATIENT REFERRED TO:  Ann Odell, [address] [address], [address]  [facility] [address]  [phone]    Schedule an appointment as soon as possible for a visit in 5 days  For repeat evaluation      DISCHARGE MEDICATIONS:  New Prescriptions    SULFAMETHOXAZOLE-TRIMETHOPRIM (BACTRIM DS) 800-160 MG PER TABLET    Take 1 tablet by mouth 2 times daily for 10 days          (Please note:  Portions of this note were completed with a voice recognition program.  Efforts were made to edit the dictations but occasionally words and phrases are mis-transcribed.)  Form v2016. J.5-cn    Hue Mackey DO (electronically signed)  Emergency Medicine Provider        DO Amos  05/30/19 1234

## 2019-05-30 NOTE — PROGRESS NOTES
Cardiac Rehab Initial History and Assessment    [place] 1935  5/30/2019    Primary Diagnosis: PTCA with stents  Living Will: Yes   On File: No  Durable Power of :Yes    Medical History  Past Medical History:   Diagnosis Date    Abnormal stress test 2017    Arrhythmia     ASHD (arteriosclerotic heart disease)     CKD (chronic kidney disease)     Closed traumatic fracture of right femur with routine healing 1955    trauma while in [employer], plate and scews placed and removed    COPD (chronic obstructive pulmonary disease) (HCC)     Elevated PSA     Enlarged prostate     Gallstones     Gout     History of kidney stones     Hydrocele in adult     Hyperlipidemia     Hypertension     Renal stones     Trigger finger     Vertigo     Wrist fracture, left      Past Surgical History:   Procedure Laterality Date    APPENDECTOMY      22 yo    CARDIAC CATHETERIZATION Left 03/28/2019    [facility] OMAR to mid RCA and mid LAD [facility] Dr. Berry Majano    plate and screws s/p trauma  and taken out in [address] [place] [address]  9/12/2012    LITHOTRIPSY      PROSTATE BIOPSY  10/22/1998, 8/26/2004    TOTAL HIP ARTHROPLASTY Right 1999       Family History  Family History   Problem Relation Age of Onset    Heart Disease Mother     Hypertension Mother     Heart Attack Mother     Parkinsonism Father     Heart Attack Father     Heart Disease Father     Kidney Disease Brother         dialysis    Arrhythmia Sister     No Known Problems Brother          Symptoms:  1. Angina   [] None   [] Tightness   [x] Shortness of Breath   [x] Pressure    [] Nausea   [] Sharp, Stabbing  [] Pallor   [] Indigestion, Heartburn [] Sweaty    Where was discomfort located? Left chest. Lasts 10-15 seconds. Precipitating Factors? Relieved by:Rest  2.  Arrhythmia   [x] None   [] Irregular Beats (skips) [] Pacer    [] Atrial Fibrillation  [] AICD    On any Medications? 3. Congestive Heart Failure   [x] None   [] Pedal Edema  [] Unusual weight gain   [] SOB with mild exertion [] Fatigue    4. Vascular   [x] None   [] Carotid Narrowing  [] R [] L   [] Peripheral claudication [] R [] L    5. Musculoskeletal    [] None   [] Back Pain  Where? [] Joint discomfort Where? Socio-Economic    Marital Status:     Nutrition    Appetite:  []  Too Good    [x]  Good  []  Poor    Diet: Low fat low cholesterol low salt  Eating out 1 times/wk  Alcohol Consumption: [] Yes [x] No   Type:  Frequency:    Caffeine: [x] Yes [] No  Type:Coffee  Amount:One cup/daily    Water intake per day: None. Drinks juice or Gatorade. Vitamins/Natural herbal products: Fish oil and powdered red beets daily    Psychological    [] Depression  [] Tearful  [] Fearful  [] Cheerful  [x] Anxious  [] Motivated  [] Overwhelmed    Treatment: None    Diabetes    [] Yes  [x] No  How long:   Latest BS:   Frequency of Checks:  Medication:     Stress    Source: Health issues  Relaxation techniques: Sits outside and watches birds. Hobbies:Home repair projects    Level of Education    [] 8th Grade  [x] [employer]  [] Masters  [] [employer] [] Bachelor  []  Other:    Depression Screening:  [] Have you been feeling sad. ..down in the dumps? [] Have you lost interest in your job, sports, hobbies, friends? [] Do you often feel tired? [] Do you have trouble sleeping or do you sleep too much? [] Have you been gaining or losing weight? [] Do you often feel down on yourself, that everything is your fault? [x] Do you have troubled making decisions or concentrating on your work? [] Do you often feel agitated or like you can barely move? [] Do you ever feel that life isn't worth living?    *If greater than 5 symptoms listed,  notified. Cardiac Rehab Pre - Test  1. The heart is a muscle that acts like a pump to deliver oxygen and blood to the rest of the body.    [x] True   [] False  2. Healing from the damage of a heart attach is complete in two weeks. [] True   [x] False  3. Smoking has no direct effect on the heart - it only effects your lungs. [] True   [x] False  4. Using all the salt you want is acceptable for all heart patients. [] True   [x] False  5. Chest pain that is relieved by rest or nitroglycerine is called Angina. [x] True   [] False  6. Shortness of breath, indigestion, sweating, tightness or pain in your chest are symptoms of a heart attack. [x] True   [] False  7. Swelling of the feet and ankles only means you've been on your feet too much. [] True   [x] False  8. Saturated fats raised your blood cholesterol level more than anything else in your diet. [x] True   [] False  9. High Blood pressure can take care of itself by rest alone. [] True   [x] False  10. Walking is one of the best exercises for heart attack patients. [x] True   [] False    Physical Findings    Alert and oriented X3. Respirations regular and unlabored. Skin warm, dry, pink. Heart tones distant and regular. Radial pulse regular. Lungs clear. Denies chest pain. No pedal edema noted. States has been having episodes of head pain,describes as pressure, with feeling \"fuzzy\", for about a month. Had episode yesterday at [place] [address] requiring him to sit down. PCP aware of same. Will notify Dr Tonie Santos of same today.               Goals:   -increased stamina/strength to 30-50 total exercise by increasing 1-2 level/wk and 1-2   min/wk  to achieve THR and RPE 11-13  -introduce weights/ therabands 2-4# for 5-10 reps  -manage BP better  -improved cholesterol and  Triglycerides  -develop regular exercise 30 min daily

## 2019-05-31 LAB
EKG ATRIAL RATE: 61 BPM
EKG P-R INTERVAL: 328 MS
EKG Q-T INTERVAL: 398 MS
EKG QRS DURATION: 96 MS
EKG QTC CALCULATION (BAZETT): 400 MS
EKG R AXIS: -4 DEGREES
EKG T AXIS: 37 DEGREES
EKG VENTRICULAR RATE: 61 BPM

## 2019-05-31 PROCEDURE — 93010 ELECTROCARDIOGRAM REPORT: CPT | Performed by: INTERNAL MEDICINE

## 2019-06-04 NOTE — PROGRESS NOTES
MEDICAL NUTRITION THERAPY - CARDIOPULMONARY: RATE YOUR PLATE / FOOD DIARY EVALUATION  Client data    Ht: 68\" Wt: 175# BMI: 26.6  (overweight)   Weight changes: unknown CBW: 79.5 kg   BMR: 1467 calories Est. total calorie needs: 1700 cals. Rate Your Plate Score (RYP): 38- there are many ways you can make your eating habits healthier    Food diary:  Reveals two meals and two snacks were eaten on this day. One, maybe two, whole grain products chosen (uses \"white or wheat bread\"), one serving of canned fruit, four servings of fruit juice, and maybe one serving of vegetables were recorded (peas or green beans are the options). Whole milk used, but small quantity. Medium portion of ham was the protein of choice at dinner, higher in sodium. Used butter twice. Uses weights daily for activity. Recommendations include: Increase whole grains (choose instead of refined wheat products)      Increase whole fruit and vegetable use (at least 5 a day combined)      Eat, instead of drinking fruit items. Incorporate daily activity (>30 minutes)      Read food labels for Saturated (<13 grams daily) and Trans fats (zero daily)      Read food labels for Sodium content (goal is <2000 mg daily)      Measure food portions and use MyPlate as guide for meal variety      Drink water throughout the day for hydration       Review nutrition information from restaurants      Work your way down to 1% or skim milk       Work on eating three balanced meals a day      Review highlighted food label handout for areas to focus on. Provided literature on Heart Healthy Eating with commentary from food diary reviewed (including recommendations to improve nutrition intakes). (attached below).     Electronically signed by Brionna Iniguez, Dietetic Intern 6/4/2019, 12:45 PM  Electronically signed by Samira Mistry RD, LD on 6/4/2019 at 12:36 PM    Cardiopulmonary Rehab   Medical Nutrition Therapy Food Diary Evaluation    Patient Name: Yuri Campbell Bell   Dietetic Student: Ondina Hills   Registered Dietitian:  Don Renteria RD, LD  Date:  6/4/2019    Dear Jad Ritter,    Thank you for sharing your information about your eating patterns, it serves not only to help me see what you are eating, but you as well. Eating 3 meals a day is great way to start, work on trying to eat three balanced meals a day. Whole grains, fruits and vegetables, along with lean meats and low fat dairy are the cornerstones of your health. With that in mind, look below for some suggestions. · Overall, create well-balanced meals and utilize all of the food groups. Limit fat to 57 grams total fat, 13 saturated fat and zero (0) grams Trans fat (these numbers are based on the Consolidated [place] Cholesterol Education Program and American Heart Association recommendations). Sodium should be kept under 2000 mg daily. · Aim for at least 2 servings of fresh or frozen veggies daily (not including peas, corn or potatoes). We call these real vegetables. Most people do not consume enough. They are good for vitamins, minerals and fiber. Remember to use at lunch and dinner to help add some color to your meals. · Aim for at least 3 servings of fresh, frozen or canned fruit daily. Eat your fruits and avoid drinking them. Fresh fruit with the skin is the most nutritious way to go. Be consistent by having a fruit choice at each meal.  Keep in mind that every 4 ounces of juice is equivalent to a piece of fruit, so it is very easy to over-consume when we drink fruits. · When choosing meat, avoid high fat varieties, such as cold cuts or hot dogs. Three ounces, or about the size of a deck of cards is a good portion at lunch and dinner. Eggs are good for protein too, but limit yolks to 3 per week (per the American Heart Association). · When cooking, try to bake, broil or grill. If you have to arthur something, use healthy oil sprays that are canola or olive oil based.   · Try a fortified margarine spread with plant sterols and stanols. These can decrease your Total and LDL Cholesterol levels. Some examples are Benecol, Promise ACTIV, and Smart Balance. You will need to use 2-3 tablespoons (consistently) per day to get the benefit. · Incorporate more Omega 3 fats in your diet with walnuts and ground flaxseed (both are good in salads), as well as, salmon and tuna. These are helpful in reducing triglycerides. · Be careful of hidden sodium in foods, not just the added table salt. Remember your goals for sodium should be less than 2000 mg daily. Youll be amazed how much extra comes in packaged, convenience and [employer]. Be sure to read food labels. When eating out ask to not have salt added to your food. If you have internet access, look up restaurant nutrition facts online before you go out. · Half of all your grains should be whole grains. Fiber intake goals of 25-35 grams a day is recommended for bowel health and regularity. Your diary reflects that there could be improvements made. High fiber items are whole grain bread products, whole fruits, vegetables, and dried beans/legumes. Soluble fiber (like in oats and fruits) is great for reducing cholesterol. · Skim or 1% milk is the recommended variety when you choose milk. Lite yogurt also makes a great dairy choice. If you are having a difficult time changing to skim milk, try 1% for a while, or even mixing skim and 2% to wean yourself downward. · Be sure to get plenty of fluid daily, aiming for at least 8 cups. Water is typically the best choice. Other beverages count as well as long as they are not caffeinated. · No food is completely off limits. However, when you do have a treat or dessert, know what you are consuming from a Sodium, Saturated Fat, and Trans Fat standpoint. · Keeping a food diary can help keep all of this information in check.    · Daily activity (at least 5 days a week) is recommended for health maintenance, not just weight loss. Ideally, 30 minutes each day to aid with wellness. Recommendation snapshot: Increase whole grains (choose instead of refined wheat products)      Increase whole fruit and vegetable use (at least 5 a day combined)      Eat, instead of drinking fruit items. Incorporate daily activity (>30 minutes)      Read food labels for Saturated (<13 grams daily) and Trans fats (zero daily)      Read food labels for Sodium content (goal is <2000 mg daily)      Measure food portions and use MyPlate as guide for meal variety      Drink water throughout the day for hydration       Review nutrition information from restaurants      Work your way down to 1% or skim milk      Work on eating three balanced meals a day       Review highlighted food label handout for areas to focus on. Please contact a dietitian at [phone] or [phone] with any questions.

## 2019-07-03 ENCOUNTER — OFFICE VISIT (OUTPATIENT)
Dept: CARDIOLOGY | Age: 84
End: 2019-07-03
Payer: MEDICARE

## 2019-07-03 VITALS
WEIGHT: 178.2 LBS | SYSTOLIC BLOOD PRESSURE: 135 MMHG | BODY MASS INDEX: 27.01 KG/M2 | OXYGEN SATURATION: 96 % | DIASTOLIC BLOOD PRESSURE: 65 MMHG | HEART RATE: 77 BPM | HEIGHT: 68 IN

## 2019-07-03 DIAGNOSIS — I25.10 CORONARY ARTERY DISEASE INVOLVING NATIVE CORONARY ARTERY OF NATIVE HEART WITHOUT ANGINA PECTORIS: ICD-10-CM

## 2019-07-03 DIAGNOSIS — R42 LIGHTHEADEDNESS: Primary | ICD-10-CM

## 2019-07-03 DIAGNOSIS — I10 ESSENTIAL HYPERTENSION: ICD-10-CM

## 2019-07-03 DIAGNOSIS — E78.2 MIXED HYPERLIPIDEMIA: ICD-10-CM

## 2019-07-03 PROCEDURE — 99214 OFFICE O/P EST MOD 30 MIN: CPT | Performed by: INTERNAL MEDICINE

## 2019-07-03 PROCEDURE — 4040F PNEUMOC VAC/ADMIN/RCVD: CPT | Performed by: INTERNAL MEDICINE

## 2019-07-03 PROCEDURE — 1036F TOBACCO NON-USER: CPT | Performed by: INTERNAL MEDICINE

## 2019-07-03 PROCEDURE — 1123F ACP DISCUSS/DSCN MKR DOCD: CPT | Performed by: INTERNAL MEDICINE

## 2019-07-03 PROCEDURE — G8419 CALC BMI OUT NRM PARAM NOF/U: HCPCS | Performed by: INTERNAL MEDICINE

## 2019-07-03 PROCEDURE — G8427 DOCREV CUR MEDS BY ELIG CLIN: HCPCS | Performed by: INTERNAL MEDICINE

## 2019-07-03 PROCEDURE — G8598 ASA/ANTIPLAT THER USED: HCPCS | Performed by: INTERNAL MEDICINE

## 2019-07-03 RX ORDER — CLOPIDOGREL BISULFATE 75 MG/1
75 TABLET ORAL DAILY
Qty: 90 TABLET | Refills: 3 | Status: SHIPPED | OUTPATIENT
Start: 2019-07-03 | End: 2020-09-08

## 2019-07-03 RX ORDER — ATORVASTATIN CALCIUM 40 MG/1
40 TABLET, FILM COATED ORAL DAILY
Qty: 90 TABLET | Refills: 3 | Status: SHIPPED | OUTPATIENT
Start: 2019-07-03 | End: 2020-04-23

## 2019-07-03 NOTE — PROGRESS NOTES
I, Christina Rodriguez am scribing for and in the presence of Julia Ramos MD, F.A.C.C..    Patient: Gloria Gale  : 1935  Date of Visit: July 3, 2019    REASON FOR VISIT / CONSULTATION: 3 Month Follow-Up (HX: CAD, Stent, HTN, Dyslipidemia. Pt states he's been doing good other than having a fuzzy feeling off and on, but has been improving but this improving. He said he struggles with vertigo. SOB, unchanged. Denies: CP, palpitations.)      History of Present Illness:        Dear Tamiko Roman, APRN - CNP    I had the pleasure of seeing Gloria Gale in my office today. Mr. Lubna Chavarria is a 80 y.o. male who presented for follow-up    He initially presented with a chest tightness. Subsequent cardiac catheterizations showed two-vessel coronary artery disease. Patient status post PCI to LAD and RCA 3/28/2019. He denies any prior history of myocardial infarction or heart failure. No significant heart rhythm problem. He has history of abnormal stress test back in 2017, treated medically. He does have a long history of high cholesterol. He is a past smoker and has quit over 40 years ago. His mother had heart problems unsure about fathers history. S/P Heart Cath done on 3/28/019, S/P PCI to mid LAD and mid RCA. Mr. Lubna Chavarria is here for a 3 month follow up. He is doing well other than having issues with vertigo, which has been improving. He has been going to therapy for this. He would say this is somewhat helping. He would say he is drinking about 1 quart of fluids a day. He continues to have shortness of breath an weakness when he is doing certain activities like weed whacking, but this is unchanged. He is wanting to cut some of his medication out as he thinks this is contributing to his lightheadedness/dizziness. He denies any chest pain, pressure, or tightness. No worsening shortness of breath, orthopnea or PND. No palpitations.  No abdominal pain, bleeding issues, medication problems, or any other concerns at this time. PAST MEDICAL HISTORY:        Past Medical History:   Diagnosis Date    Abnormal stress test 2017    Arrhythmia     ASHD (arteriosclerotic heart disease)     CKD (chronic kidney disease)     Closed traumatic fracture of right femur with routine healing 1955    trauma while in [employer], plate and scews placed and removed    COPD (chronic obstructive pulmonary disease) (HCC)     Elevated PSA     Enlarged prostate     Gallstones     Gout     History of kidney stones     Hydrocele in adult     Hyperlipidemia     Hypertension     Renal stones     Trigger finger     Vertigo     Vertigo     Wrist fracture, left        CURRENT ALLERGIES: Penicillins REVIEW OF SYSTEMS: 14 systems were reviewed. Pertinent positives and negatives as above, all else negative. Past Surgical History:   Procedure Laterality Date    APPENDECTOMY      22 yo    CARDIAC CATHETERIZATION Left 2019    [facility]    plate and screws s/p trauma  and taken out in [address] [place] [address]  2012    LITHOTRIPSY      PROSTATE BIOPSY  10/22/1998, 2004    TOTAL HIP ARTHROPLASTY Right     Social History:  Social History     Tobacco Use    Smoking status: Former Smoker     Packs/day: 1.00     Years: 25.00     Pack years: 25.00     Last attempt to quit: 1974     Years since quittin.4    Smokeless tobacco: Never Used   Substance Use Topics    Alcohol use: No     Comment: quit 38 years ago.     Drug use: No        CURRENT MEDICATIONS:        Outpatient Medications Marked as Taking for the 7/3/19 encounter (Office Visit) with Jessy Peres MD   Medication Sig Dispense Refill    metoprolol tartrate (LOPRESSOR) 25 MG tablet Take 1 tablet by mouth 2 times daily 180 tablet 1    lisinopril (PRINIVIL;ZESTRIL) 5 MG tablet Take 0.5 tablets by mouth daily 90 edema. No cyanosis and no clubbing. Pulses are 2+ radial and carotid pulses. 2+ dorsalis pedis and posterior tibial pulses bilaterally. Neurological: He is alert and oriented to person. No evidence of gross cranial nerve deficit. Coordination appeared normal.   Skin: Skin is warm and dry. There is no rash or diaphoresis. Psychiatric: He has a normal mood and affect. His speech is normal and behavior is normal.      MOST RECENT LABS ON RECORD:   Lab Results   Component Value Date    WBC 7.6 05/30/2019    HGB 12.6 (L) 05/30/2019    HCT 40.7 05/30/2019     05/30/2019    CHOL 219 (H) 01/21/2019    TRIG 150 (H) 01/21/2019    HDL 42.5 01/21/2019    ALT 11 05/30/2019    AST 18 05/30/2019     05/30/2019    K 4.2 05/30/2019     05/30/2019    CREATININE 1.35 (H) 05/30/2019    BUN 24 (H) 05/30/2019    CO2 26 05/30/2019    PSA 9.27 (H) 07/18/2012    LABA1C 5.4 01/21/2019       ASSESSMENT:     1. Lightheadedness    2. Coronary artery disease involving native coronary artery of native heart without angina pectoris    3. Essential hypertension    4. Mixed hyperlipidemia         PLAN:        · Lightheadedness/dizziness: His home blood pressure readings do not indicate this is related to any blood pressure issues. · Symptoms are very classic of benign positional vertigo  · Stop Imdur  · Continue vestibular rehab therapy  · Nonpharmacologic counseling: Because of his condition, I reminded him to try and keep himself well-hydrated and to take extra time when moving from laying to sitting, sitting to standing and standing to walking. I also explained to him to help improve his symptoms he should include  1 or 2 L of sports drinks daily, knee-high compressions stockings. · Additional Testing List: None. No heart rate or blood pressure abnormalities during episodes of vertigo.     · Atherosclerotic Heart Disease: S/P Stents done on 3/28 by Dr. Isa Sims Antiplatelet Agent: Continue aspirin 81 mg daily and Plavix

## 2019-07-03 NOTE — PATIENT INSTRUCTIONS
SURVEY:    You may be receiving a survey from [employer] regarding your visit today. Please complete the survey to enable us to provide the highest quality of care to you and your family. If you cannot score us a very good on any question, please call the office to discuss how we could have made your experience a very good one. Thank you.

## 2019-09-09 ENCOUNTER — TELEPHONE (OUTPATIENT)
Dept: CARDIOLOGY | Age: 84
End: 2019-09-09

## 2019-10-30 ENCOUNTER — OFFICE VISIT (OUTPATIENT)
Dept: UROLOGY | Age: 84
End: 2019-10-30
Payer: MEDICARE

## 2019-10-30 ENCOUNTER — HOSPITAL ENCOUNTER (OUTPATIENT)
Age: 84
Setting detail: SPECIMEN
Discharge: HOME | End: 2019-10-30
Payer: MEDICARE

## 2019-10-30 VITALS — DIASTOLIC BLOOD PRESSURE: 60 MMHG | BODY MASS INDEX: 27.37 KG/M2 | SYSTOLIC BLOOD PRESSURE: 118 MMHG | WEIGHT: 180 LBS

## 2019-10-30 DIAGNOSIS — R31.0 GROSS HEMATURIA: ICD-10-CM

## 2019-10-30 DIAGNOSIS — R33.9 URINARY RETENTION: ICD-10-CM

## 2019-10-30 DIAGNOSIS — N13.8 BPH WITH OBSTRUCTION/LOWER URINARY TRACT SYMPTOMS: Primary | ICD-10-CM

## 2019-10-30 DIAGNOSIS — N20.0 RENAL STONE: ICD-10-CM

## 2019-10-30 DIAGNOSIS — N40.1 BPH WITH OBSTRUCTION/LOWER URINARY TRACT SYMPTOMS: Primary | ICD-10-CM

## 2019-10-30 DIAGNOSIS — N13.8 BPH WITH OBSTRUCTION/LOWER URINARY TRACT SYMPTOMS: ICD-10-CM

## 2019-10-30 DIAGNOSIS — K59.00 CONSTIPATION, UNSPECIFIED CONSTIPATION TYPE: ICD-10-CM

## 2019-10-30 DIAGNOSIS — N40.1 BPH WITH OBSTRUCTION/LOWER URINARY TRACT SYMPTOMS: ICD-10-CM

## 2019-10-30 LAB
-: ABNORMAL
AMORPHOUS: ABNORMAL
BACTERIA: ABNORMAL
BILIRUBIN URINE: NEGATIVE
CASTS UA: ABNORMAL /LPF
COLOR: YELLOW
COMMENT UA: ABNORMAL
CRYSTALS, UA: ABNORMAL /HPF
EPITHELIAL CELLS UA: ABNORMAL /HPF (ref 0–5)
GLUCOSE URINE: NEGATIVE
KETONES, URINE: NEGATIVE
LEUKOCYTE ESTERASE, URINE: ABNORMAL
MUCUS: ABNORMAL
NITRITE, URINE: NEGATIVE
OTHER OBSERVATIONS UA: ABNORMAL
PH UA: 5.5 (ref 5–9)
PROTEIN UA: ABNORMAL
RBC UA: ABNORMAL /HPF (ref 0–2)
RENAL EPITHELIAL, UA: ABNORMAL /HPF
SPECIFIC GRAVITY UA: 1.02 (ref 1.01–1.02)
TRICHOMONAS: ABNORMAL
TURBIDITY: CLEAR
URINE HGB: NEGATIVE
UROBILINOGEN, URINE: NORMAL
WBC UA: ABNORMAL /HPF (ref 0–5)
YEAST: ABNORMAL

## 2019-10-30 PROCEDURE — 1123F ACP DISCUSS/DSCN MKR DOCD: CPT | Performed by: NURSE PRACTITIONER

## 2019-10-30 PROCEDURE — 51798 US URINE CAPACITY MEASURE: CPT | Performed by: NURSE PRACTITIONER

## 2019-10-30 PROCEDURE — 87086 URINE CULTURE/COLONY COUNT: CPT

## 2019-10-30 PROCEDURE — 1036F TOBACCO NON-USER: CPT | Performed by: NURSE PRACTITIONER

## 2019-10-30 PROCEDURE — G8484 FLU IMMUNIZE NO ADMIN: HCPCS | Performed by: NURSE PRACTITIONER

## 2019-10-30 PROCEDURE — G8417 CALC BMI ABV UP PARAM F/U: HCPCS | Performed by: NURSE PRACTITIONER

## 2019-10-30 PROCEDURE — G8427 DOCREV CUR MEDS BY ELIG CLIN: HCPCS | Performed by: NURSE PRACTITIONER

## 2019-10-30 PROCEDURE — 99214 OFFICE O/P EST MOD 30 MIN: CPT | Performed by: NURSE PRACTITIONER

## 2019-10-30 PROCEDURE — 4040F PNEUMOC VAC/ADMIN/RCVD: CPT | Performed by: NURSE PRACTITIONER

## 2019-10-30 PROCEDURE — 81001 URINALYSIS AUTO W/SCOPE: CPT

## 2019-10-30 PROCEDURE — G8598 ASA/ANTIPLAT THER USED: HCPCS | Performed by: NURSE PRACTITIONER

## 2019-10-30 RX ORDER — POLYETHYLENE GLYCOL 3350 17 G/17G
17 POWDER, FOR SOLUTION ORAL DAILY
Qty: 1530 G | Refills: 1 | Status: SHIPPED | OUTPATIENT
Start: 2019-10-30 | End: 2019-11-29

## 2019-10-30 ASSESSMENT — ENCOUNTER SYMPTOMS
EYE REDNESS: 0
NAUSEA: 0
ABDOMINAL PAIN: 0
COUGH: 0
SHORTNESS OF BREATH: 0
EYE PAIN: 0
BACK PAIN: 0
VOMITING: 0
COLOR CHANGE: 0
WHEEZING: 0
CONSTIPATION: 1

## 2019-10-31 LAB
CULTURE: NO GROWTH
Lab: NORMAL
SPECIMEN DESCRIPTION: NORMAL

## 2019-11-01 ENCOUNTER — TELEPHONE (OUTPATIENT)
Dept: UROLOGY | Age: 84
End: 2019-11-01

## 2019-12-10 ENCOUNTER — HOSPITAL ENCOUNTER (OUTPATIENT)
Age: 84
Discharge: HOME | End: 2019-12-12
Payer: MEDICARE

## 2019-12-10 ENCOUNTER — HOSPITAL ENCOUNTER (OUTPATIENT)
Dept: GENERAL RADIOLOGY | Age: 84
Discharge: HOME | End: 2019-12-12
Payer: MEDICARE

## 2019-12-10 DIAGNOSIS — G89.29 NECK PAIN, CHRONIC: ICD-10-CM

## 2019-12-10 DIAGNOSIS — M54.2 NECK PAIN, CHRONIC: ICD-10-CM

## 2019-12-10 PROCEDURE — 72050 X-RAY EXAM NECK SPINE 4/5VWS: CPT

## 2019-12-17 ENCOUNTER — HOSPITAL ENCOUNTER (OUTPATIENT)
Dept: VASCULAR LAB | Age: 84
Discharge: HOME | End: 2019-12-19
Payer: MEDICARE

## 2019-12-17 DIAGNOSIS — R42 VERTIGO: ICD-10-CM

## 2019-12-17 PROCEDURE — 93880 EXTRACRANIAL BILAT STUDY: CPT

## 2020-01-06 ENCOUNTER — OFFICE VISIT (OUTPATIENT)
Dept: CARDIOLOGY | Age: 85
End: 2020-01-06
Payer: MEDICARE

## 2020-01-06 VITALS
SYSTOLIC BLOOD PRESSURE: 144 MMHG | WEIGHT: 174 LBS | HEIGHT: 68 IN | BODY MASS INDEX: 26.37 KG/M2 | RESPIRATION RATE: 18 BRPM | OXYGEN SATURATION: 100 % | HEART RATE: 69 BPM | DIASTOLIC BLOOD PRESSURE: 67 MMHG

## 2020-01-06 PROCEDURE — 1123F ACP DISCUSS/DSCN MKR DOCD: CPT | Performed by: INTERNAL MEDICINE

## 2020-01-06 PROCEDURE — G8484 FLU IMMUNIZE NO ADMIN: HCPCS | Performed by: INTERNAL MEDICINE

## 2020-01-06 PROCEDURE — 99214 OFFICE O/P EST MOD 30 MIN: CPT | Performed by: INTERNAL MEDICINE

## 2020-01-06 PROCEDURE — 99213 OFFICE O/P EST LOW 20 MIN: CPT | Performed by: INTERNAL MEDICINE

## 2020-01-06 PROCEDURE — 4040F PNEUMOC VAC/ADMIN/RCVD: CPT | Performed by: INTERNAL MEDICINE

## 2020-01-06 PROCEDURE — 1036F TOBACCO NON-USER: CPT | Performed by: INTERNAL MEDICINE

## 2020-01-06 PROCEDURE — G8427 DOCREV CUR MEDS BY ELIG CLIN: HCPCS | Performed by: INTERNAL MEDICINE

## 2020-01-06 PROCEDURE — G8417 CALC BMI ABV UP PARAM F/U: HCPCS | Performed by: INTERNAL MEDICINE

## 2020-01-06 NOTE — PROGRESS NOTES
Laci Lobo am scribing for and in the presence of Charly Lozada MD, F.A.C.C. Patient: Leonor Grace  : 1935  Date of Visit: 2020    REASON FOR VISIT / CONSULTATION: Follow-up (Hx:lightheaded, CAD, HTN, HLD. Pt is here today for 6 month follow up. Pt forgets to take nightly meds often. Pt is doing good today but has had some dizziness and lightheaded with no falls. Denies: CP, SOB, palpitations.)      History of Present Illness:        Dear Severiano Beam, APRN - CNP    I had the pleasure of seeing Leonor Grace in my office today. Mr. Benjamin Acevedo is a 80 y.o. male who presented for follow-up. He initially presented with a chest tightness. Subsequent cardiac catheterizations showed two-vessel coronary artery disease. Patient status post PCI to LAD and RCA 3/28/2019. He denies any prior history of myocardial infarction or heart failure. No significant heart rhythm problem. He does have a long history of high cholesterol. He is a past smoker and has quit over 40 years ago. His mother had heart problems unsure about fathers history. S/P Heart Cath done on 3/28/019, S/P PCI to mid LAD and mid RCA. Mr. Benjamin Acevedo is here for a follow up today. He is doing well other than having issues with vertigo. He has been going to therapy for this. No falls or near falls. He admits having lightheadedness with 1 near passing out episode several weeks ago. He attributes this to his vertigo. He denies any chest pain, pressure, or tightness. No worsening shortness of breath, orthopnea or PND. No palpitations. No abdominal pain, bleeding issues or any other concern at this time. His wife reported that he is not taking his medicines. Frequently forgets his morning meds and is not taking his night meds at all.     PAST MEDICAL HISTORY:        Past Medical History:   Diagnosis Date    Abnormal stress test     Arrhythmia     ASHD (arteriosclerotic heart disease)     CKD (chronic kidney disease)     Closed traumatic fracture of right femur with routine healing 1955    trauma while in [employer], plate and scews placed and removed    COPD (chronic obstructive pulmonary disease) (HCC)     Elevated PSA     Enlarged prostate     Gallstones     Gout     History of kidney stones     Hydrocele in adult     Hyperlipidemia     Hypertension     Renal stones     Trigger finger     Vertigo     Vertigo     Wrist fracture, left        CURRENT ALLERGIES: Penicillins REVIEW OF SYSTEMS: 14 systems were reviewed. Pertinent positives and negatives as above, all else negative. Past Surgical History:   Procedure Laterality Date    APPENDECTOMY      22 yo    CARDIAC CATHETERIZATION Left 2019    Right Radial[facility] : [facility]    plate and screws s/p trauma  and taken out in [address] [place] [address]  2012    LITHOTRIPSY      PROSTATE BIOPSY  10/22/1998, 2004    TOTAL HIP ARTHROPLASTY Right     Social History:  Social History     Tobacco Use    Smoking status: Former Smoker     Packs/day: 1.00     Years: 25.00     Pack years: 25.00     Last attempt to quit: 1974     Years since quittin.9    Smokeless tobacco: Never Used   Substance Use Topics    Alcohol use: No     Comment: quit 38 years ago.     Drug use: No        CURRENT MEDICATIONS:        Outpatient Medications Marked as Taking for the 20 encounter (Office Visit) with Gurinder Norris MD   Medication Sig Dispense Refill    clopidogrel (PLAVIX) 75 MG tablet Take 1 tablet by mouth daily 90 tablet 3    atorvastatin (LIPITOR) 40 MG tablet Take 1 tablet by mouth daily 90 tablet 3    metoprolol tartrate (LOPRESSOR) 25 MG tablet Take 1 tablet by mouth 2 times daily 180 tablet 1    lisinopril (PRINIVIL;ZESTRIL) 5 MG tablet Take 0.5 tablets by mouth daily 90 tablet 1    Omega-3 Fatty Acids (FISH OIL) 1000 MG CAPS Take 3,000 mg by mouth daily       amLODIPine (NORVASC) 5 MG tablet Take 1 tablet by mouth daily 90 tablet 3    nitroGLYCERIN (NITROSTAT) 0.4 MG SL tablet Place 1 tablet under the tongue every 5 minutes as needed for Chest pain 25 tablet 0    aspirin 81 MG EC tablet Take 81 mg by mouth daily. FAMILY HISTORY: family history includes Arrhythmia in his sister; Heart Attack in his father and mother; Heart Disease in his father and mother; Hypertension in his mother; Kidney Disease in his brother; No Known Problems in his brother; Parkinsonism in his father. Physical Examination:      BP (!) 144/67 (Site: Right Upper Arm, Position: Sitting, Cuff Size: Medium Adult)   Pulse 69   Resp 18   Ht 5' 7.99\" (1.727 m)   Wt 174 lb (78.9 kg)   SpO2 100%   BMI 26.46 kg/m²  Body mass index is 26.46 kg/m². Constitutional: He is oriented to person. He appears well-developed and well-nourished. In no acute distress. HEENT: Normocephalic and atraumatic. No JVD present. Carotid bruit is not present. No mass and no thyromegaly present. No lymphadenopathy present. Cardiovascular: Normal rate, regular rhythm, normal heart sounds. Exam reveals no gallop and no friction rubs. No heart murmur heard. Pulmonary/Chest: Effort normal and breath sounds normal. No respiratory distress. He has no wheezes, rhonchi or rales. Abdominal: Soft, non-tender. Bowel sounds and aorta are normal. He exhibits no organomegaly, mass or bruit. Extremities: No edema. No cyanosis and no clubbing. Pulses are 2+ radial and carotid pulses. 2+ dorsalis pedis and posterior tibial pulses bilaterally. Neurological: He is alert and oriented to person. No evidence of gross cranial nerve deficit. Coordination appeared normal.   Skin: Skin is warm and dry. There is no rash or diaphoresis. Psychiatric: He has a normal mood and affect.  His speech is normal and behavior is normal.      MOST RECENT LABS ON RECORD:   Lab Results   Component 75 mg/dL   · Cholesterol Reduction Therapy: Continue Atorvastatin (Lipitor) 40 mg daily. · Laboratory testing: Ordered a Lipid Panel for him to get done. · Counseled patient extensively to take his medication as prescribed. There is no barrier to taking medications except that he does not like taking medicines. FOLLOW UP:   I told Mr. Ramesh Tobin to call my office if he had any problems, but otherwise I asked him to Return in about 6 months (around 7/6/2020). However, I would be happy to see him sooner should the need arise. Sincerely,  Nikolay Werner MD, F.A.C.C. [facility] Cardiology Specialist    [address] [facility], [address] [place] [address]  Phone: [phone], Fax: [phone]     I believe that the risk of significant morbidity and mortality related to the patient's current medical conditions are: intermediate-high. The documentation recorded by the scribe, accurately and completely reflects the services I personally performed and the decisions made by me. Nikolay Werner MD, F.A.C.C.  January 6, 2020

## 2020-01-06 NOTE — PATIENT INSTRUCTIONS
SURVEY:    You may be receiving a survey from [employer] regarding your visit today. Please complete the survey to enable us to provide the highest quality of care to you and your family. If you cannot score us a very good on any question, please call the office to discuss how we could have made your experience a very good one. Thank you.

## 2020-03-26 ENCOUNTER — HOSPITAL ENCOUNTER (OUTPATIENT)
Dept: MRI IMAGING | Age: 85
Discharge: HOME | End: 2020-03-28
Payer: MEDICARE

## 2020-03-26 PROCEDURE — 70551 MRI BRAIN STEM W/O DYE: CPT

## 2020-03-26 PROCEDURE — 70544 MR ANGIOGRAPHY HEAD W/O DYE: CPT

## 2020-03-26 PROCEDURE — 72141 MRI NECK SPINE W/O DYE: CPT

## 2020-04-23 ENCOUNTER — OFFICE VISIT (OUTPATIENT)
Dept: NEUROLOGY | Age: 85
End: 2020-04-23
Payer: MEDICARE

## 2020-04-23 VITALS
RESPIRATION RATE: 18 BRPM | BODY MASS INDEX: 25.85 KG/M2 | HEART RATE: 68 BPM | DIASTOLIC BLOOD PRESSURE: 61 MMHG | TEMPERATURE: 97.8 F | HEIGHT: 68 IN | SYSTOLIC BLOOD PRESSURE: 124 MMHG | WEIGHT: 170.6 LBS

## 2020-04-23 PROCEDURE — G8417 CALC BMI ABV UP PARAM F/U: HCPCS | Performed by: NEUROMUSCULOSKELETAL MEDICINE, SPORTS MEDICINE

## 2020-04-23 PROCEDURE — 99213 OFFICE O/P EST LOW 20 MIN: CPT

## 2020-04-23 PROCEDURE — 1036F TOBACCO NON-USER: CPT | Performed by: NEUROMUSCULOSKELETAL MEDICINE, SPORTS MEDICINE

## 2020-04-23 PROCEDURE — 1123F ACP DISCUSS/DSCN MKR DOCD: CPT | Performed by: NEUROMUSCULOSKELETAL MEDICINE, SPORTS MEDICINE

## 2020-04-23 PROCEDURE — 4040F PNEUMOC VAC/ADMIN/RCVD: CPT | Performed by: NEUROMUSCULOSKELETAL MEDICINE, SPORTS MEDICINE

## 2020-04-23 PROCEDURE — 99203 OFFICE O/P NEW LOW 30 MIN: CPT | Performed by: NEUROMUSCULOSKELETAL MEDICINE, SPORTS MEDICINE

## 2020-04-23 PROCEDURE — G8427 DOCREV CUR MEDS BY ELIG CLIN: HCPCS | Performed by: NEUROMUSCULOSKELETAL MEDICINE, SPORTS MEDICINE

## 2020-04-23 RX ORDER — ZINC GLUCONATE 50 MG
50 TABLET ORAL DAILY
COMMUNITY

## 2020-04-23 NOTE — PATIENT INSTRUCTIONS
SURVEY:    You may be receiving a survey from [employer] regarding your visit today. Please complete the survey to enable us to provide the highest quality of care to you and your family. If you cannot score us a very good on any question, please call the office to discuss how we could have made your experience a very good one. Thank you. Patient Education        Headache: Care Instructions  Your Care Instructions    Headaches have many possible causes. Most headaches aren't a sign of a more serious problem, and they will get better on their own. Home treatment may help you feel better faster. The doctor has checked you carefully, but problems can develop later. If you notice any problems or new symptoms, get medical treatment right away. Follow-up care is a key part of your treatment and safety. Be sure to make and go to all appointments, and call your doctor if you are having problems. It's also a good idea to know your test results and keep a list of the medicines you take. How can you care for yourself at home? · Do not drive if you have taken a prescription pain medicine. · Rest in a quiet, dark room until your headache is gone. Close your eyes and try to relax or go to sleep. Don't watch TV or read. · Put a cold, moist cloth or cold pack on the painful area for 10 to 20 minutes at a time. Put a thin cloth between the cold pack and your skin. · Use a warm, moist towel or a heating pad set on low to relax tight shoulder and neck muscles. · Have someone gently massage your neck and shoulders. · Take pain medicines exactly as directed. ? If the doctor gave you a prescription medicine for pain, take it as prescribed. ? If you are not taking a prescription pain medicine, ask your doctor if you can take an over-the-counter medicine.   · Be careful not to take pain medicine more often than the instructions allow, because you may get worse or more frequent headaches when the medicine wears

## 2020-04-23 NOTE — PROGRESS NOTES
Shortness of breath: absent, choking:  absent, Cough: absent, Snoring : absent   Cardiovascular Chest pain: present, Leg swelling :absent, palpitations : absent, fainting : absent   GI Constipation: absent, Diarrhea: absent, Swallowing change: absent    Urinary frequency: absent, Urinary urgency: absent, Urinary incontinence: absent   Musculoskeletal Neck pain: present, Back pain: present, Stiffness: absent, Muscle pain: absent, Joint pain: absent, restless leg : absent   Dermatological Hair loss: absent, Skin changes: absent   Neurological Confusion: absent, Trouble concentrating: absent, Seizures: absent;  Memory loss: absent, balance problem: present, Dizziness: present, vertigo: absent, Weakness: present, Numbness absent, Tremor: absent, Spasm: absent, involuntary movement: absent, Speech difficulty: absent, Headache: present, Light sensitivity: present   Psychiatric Anxiety: absent, Depression  absent, drug abuse: absent, Hallucination: absent, mood disorder: absent, Suicidal ideations absent   Hematologic Abnormal bleeding: absent, Anemia: absent, Lymph gland changes: absent Clotting disorder: absent     Past Medical History:   Diagnosis Date    Abnormal stress test 2017    Arrhythmia     ASHD (arteriosclerotic heart disease)     CKD (chronic kidney disease)     Closed traumatic fracture of right femur with routine healing 1955    trauma while in [employer], plate and scews placed and removed    COPD (chronic obstructive pulmonary disease) (HCC)     Elevated PSA     Enlarged prostate     Gallstones     Gout     History of kidney stones     Hydrocele in adult     Hyperlipidemia     Hypertension     Renal stones     Trigger finger     Vertigo     Vertigo     Wrist fracture, left        Past Surgical History:   Procedure Laterality Date    APPENDECTOMY      20 yo    CARDIAC CATHETERIZATION Left 03/28/2019    [facility] OMAR to mid RCA and mid LAD inge Miller

## 2020-04-27 ENCOUNTER — HOSPITAL ENCOUNTER (OUTPATIENT)
Dept: PHYSICAL THERAPY | Age: 85
Setting detail: THERAPIES SERIES
Discharge: HOME | End: 2020-04-27
Payer: MEDICARE

## 2020-04-27 PROCEDURE — 97161 PT EVAL LOW COMPLEX 20 MIN: CPT

## 2020-04-27 PROCEDURE — 97035 APP MDLTY 1+ULTRASOUND EA 15: CPT

## 2020-04-27 PROCEDURE — G0283 ELEC STIM OTHER THAN WOUND: HCPCS

## 2020-04-28 ENCOUNTER — HOSPITAL ENCOUNTER (OUTPATIENT)
Dept: GENERAL RADIOLOGY | Age: 85
Discharge: HOME | End: 2020-04-30
Payer: MEDICARE

## 2020-04-28 ENCOUNTER — HOSPITAL ENCOUNTER (OUTPATIENT)
Age: 85
Discharge: HOME | End: 2020-04-30
Payer: MEDICARE

## 2020-04-28 PROCEDURE — 74018 RADEX ABDOMEN 1 VIEW: CPT

## 2020-04-29 ENCOUNTER — HOSPITAL ENCOUNTER (OUTPATIENT)
Dept: PHYSICAL THERAPY | Age: 85
Setting detail: THERAPIES SERIES
Discharge: HOME | End: 2020-04-29
Payer: MEDICARE

## 2020-04-29 ENCOUNTER — HOSPITAL ENCOUNTER (OUTPATIENT)
Age: 85
Setting detail: SPECIMEN
Discharge: HOME | End: 2020-04-29
Payer: MEDICARE

## 2020-04-29 ENCOUNTER — OFFICE VISIT (OUTPATIENT)
Dept: UROLOGY | Age: 85
End: 2020-04-29
Payer: MEDICARE

## 2020-04-29 VITALS
DIASTOLIC BLOOD PRESSURE: 58 MMHG | HEIGHT: 68 IN | WEIGHT: 172 LBS | BODY MASS INDEX: 26.07 KG/M2 | SYSTOLIC BLOOD PRESSURE: 108 MMHG

## 2020-04-29 LAB
-: ABNORMAL
AMORPHOUS: ABNORMAL
BACTERIA: ABNORMAL
BILIRUBIN URINE: NEGATIVE
CASTS UA: ABNORMAL /LPF
COLOR: YELLOW
COMMENT UA: ABNORMAL
CRYSTALS, UA: ABNORMAL /HPF
EPITHELIAL CELLS UA: ABNORMAL /HPF (ref 0–5)
GLUCOSE URINE: NEGATIVE
KETONES, URINE: NEGATIVE
LEUKOCYTE ESTERASE, URINE: NEGATIVE
MUCUS: ABNORMAL
NITRITE, URINE: NEGATIVE
OTHER OBSERVATIONS UA: ABNORMAL
PH UA: 6 (ref 5–9)
PROTEIN UA: NEGATIVE
RBC UA: ABNORMAL /HPF (ref 0–2)
RENAL EPITHELIAL, UA: ABNORMAL /HPF
SPECIFIC GRAVITY UA: 1.02 (ref 1.01–1.02)
TRICHOMONAS: ABNORMAL
TURBIDITY: CLEAR
URINE HGB: NEGATIVE
UROBILINOGEN, URINE: NORMAL
WBC UA: ABNORMAL /HPF (ref 0–5)
YEAST: ABNORMAL

## 2020-04-29 PROCEDURE — G0283 ELEC STIM OTHER THAN WOUND: HCPCS

## 2020-04-29 PROCEDURE — 99214 OFFICE O/P EST MOD 30 MIN: CPT | Performed by: UROLOGY

## 2020-04-29 PROCEDURE — 51798 US URINE CAPACITY MEASURE: CPT | Performed by: UROLOGY

## 2020-04-29 PROCEDURE — 99211 OFF/OP EST MAY X REQ PHY/QHP: CPT

## 2020-04-29 PROCEDURE — 81001 URINALYSIS AUTO W/SCOPE: CPT

## 2020-04-29 PROCEDURE — 87086 URINE CULTURE/COLONY COUNT: CPT

## 2020-04-29 PROCEDURE — 97140 MANUAL THERAPY 1/> REGIONS: CPT

## 2020-04-29 PROCEDURE — 97110 THERAPEUTIC EXERCISES: CPT

## 2020-04-29 ASSESSMENT — ENCOUNTER SYMPTOMS
GASTROINTESTINAL NEGATIVE: 1
EYES NEGATIVE: 1
RESPIRATORY NEGATIVE: 1

## 2020-04-29 NOTE — PATIENT INSTRUCTIONS
FOR CONSTIPATION    It is very important to have regular, soft, daily bowel movements, because it WILL improve your urinary symptoms. Take Miralax (or generic equivalent) 17g once daily (one capful). Take every day, DO NOT skip days, as it must be taken daily in order to be effective. DO NOT take just \"as needed\". It is safe to take long term and is recommended for your symptoms. If one dose daily causes loose stools/diarrhea, decrease to 1/2 capful or 1/4 capful. If you cannot tolerate this medication, please notify our office. If one dose daily of Miralax is not sufficient to produce a soft, easy to pass daily stool, you may also add an over-the-counter stool softener capsule. For example: colace (docusate). For Miralax to have maximal effectiveness be sure to increase your water intake - aim for 80 oz daily unless you are on a fluid-restriction from another provider.      STONE PREVENTION    To prevent future stone formation:    1) DO drink ~65-80 oz (2-2.5L) of water per day to stay adequately hydrated     2) AVOID/LIMIT intake of \"bad fluids\": soda/pop, coffee, tea, alcohol, energy drinks, any beverage with caffeine can act to dehydrate you       \"BAD FLUIDS\" DO NOT COUNT TOWARD THE 65-80oz of water    3) REDUCE consumption of sodium/salt - DO NOT salt your food, read labels (lunch meats, canned soups, prepared meals are high in salt), choose low salt options    4) DO NOT EAT animal protein/meat more than 2 meals a day - this includes red meat, pork, poultry and fish

## 2020-04-29 NOTE — PROGRESS NOTES
lymphadenopathy  Rectal: Deferred        Lab Results   Component Value Date    BUN 31 (H) 04/15/2020     Lab Results   Component Value Date    CREATININE 1.69 (H) 04/15/2020     Lab Results   Component Value Date    PSA 9.27 (H) 07/18/2012       ASSESSMENT:   Diagnosis Orders   1. BPH with obstruction/lower urinary tract symptoms  NC MEASUREMENT,POST-VOID RESIDUAL VOLUME BY US,NON-IMAGING    Culture, Urine    Urinalysis with Microscopic   2. Kidney stones  XR ABDOMEN (KUB) (SINGLE AP VIEW)           PLAN:  We will see him in 1 year with repeat KUB. We did get a repeat urinalysis and culture for his history of hematuria. We will call him with the results. We discussed with him use of MiraLAX and stone prophylaxis.   To continue off of Flomax as he is doing well

## 2020-04-29 NOTE — PROGRESS NOTES
Phone: [facility]           Fax: [phone]                           Outpatient Physical Therapy                                                                            Daily Note    Patient: Fidelina Fregoso : 1935  CSN #: [id number]   Referring Practitioner:  Arleth Cortes CNP    Referral Date : 20     Date: 2020    Diagnosis: Cervical neck pain with evidence of disc disease M50.90, BPPV H81.10  Treatment Diagnosis: cervicogenic dizziness, cervical pain    Onset Date: 20  PT Insurance Information: Medicare A and B  Total # of Visits Approved: 18 Per Physician Order  Total # of Visits to Date: 2  No Show: 0  Canceled Appointment: 0      Pre-Treatment Pain:  0/10  Subjective: Pt states he had a rough monday but he just has those days at times. Pt states today he is feeling okay with no current dizzy spells. Pt denies resting pain. Exercises:  Exercise 2: Yes/ no 15x ea  Exercise 3: UT stretch with therapist assist  Exercise 4: eye habituation ex 15x ea    Manual:  Manual traction: Soft manual traction, rotation stretch/ ut stretch. Modalities:  Moist heat: x15 neil UTs  E-stim (parameters): IFC x15 min to cervical/UT area for pain and tightness       Assessment  Assessment: Relief noted today with use of gentle manual traction. Pt educated on relaxation techniques and postural awareness to decrease stress placed on cervical spine. Patient Education   Postural education. Pt verbalized/demonstrated good understanding:     [x] Yes         [] No, pt required further clarification.     Post Treatment Pain:  0/10      Plan  Times per week: 3  Plan weeks: 6      Goals  (Total # of Visits to Date: 2)   Short Term Goals - Time Frame for Short term goals: 3 weeks     Short term goal 1: Initiate HEP and progress as tolerated                                        [x]Met   []Partially met  []Not met   Short term goal 2: Pt will report min to mod

## 2020-04-30 ENCOUNTER — TELEPHONE (OUTPATIENT)
Dept: UROLOGY | Age: 85
End: 2020-04-30

## 2020-04-30 LAB
CULTURE: NO GROWTH
Lab: NORMAL
SPECIMEN DESCRIPTION: NORMAL

## 2020-05-01 ENCOUNTER — HOSPITAL ENCOUNTER (OUTPATIENT)
Dept: PHYSICAL THERAPY | Age: 85
Setting detail: THERAPIES SERIES
Discharge: HOME | End: 2020-05-01
Payer: MEDICARE

## 2020-05-01 PROCEDURE — 97110 THERAPEUTIC EXERCISES: CPT

## 2020-05-01 PROCEDURE — 97140 MANUAL THERAPY 1/> REGIONS: CPT

## 2020-05-01 PROCEDURE — G0283 ELEC STIM OTHER THAN WOUND: HCPCS

## 2020-05-01 NOTE — PROGRESS NOTES
Phone: [facility]           Fax: [phone]                           Outpatient Physical Therapy                                                                            Daily Note    Patient: Emily Smith : 1935  CSN #: [id number]   Referring Practitioner:  Umesh Cazares CNP    Referral Date : 20     Date: 2020    Diagnosis: Cervical neck pain with evidence of disc disease M50.90, BPPV H81.10  Treatment Diagnosis: cervicogenic dizziness, cervical pain    Onset Date: 20  PT Insurance Information: Medicare A and B  Total # of Visits Approved: 18 Per Physician Order  Total # of Visits to Date: 3  No Show: 0  Canceled Appointment: 0      Pre-Treatment Pain:  3/10  Subjective: Pt states he hasnt had dizziness the past few days. Pt states he is still getting some pain in the base of his head/ skull but its getting better. Pt rates current pain a 2-3/10. Exercises:  Exercise 1: **HEP  Exercise 2: Yes/ no 15x ea  Exercise 3: UT stretch with therapist assist  Exercise 4: eye habituation ex 15x ea  Exercise 5: Shrugs/ rolls/ retracs 15x ea     Manual:  Manual traction: Soft manual traction, rotation stretch/ ut stretch. Modalities:  Moist heat: x15 neil UTs  Ultrasound: 1.2 W/cm2, 1 MHz, x8 min  E-stim (parameters): IFC x15 min to cervical/UT area for pain and tightness       Assessment  Assessment: Neil UT tightness remains with palpation of UT. Advanced to light scap strengthening program with appropriate tolerance. No dizzy symptoms today throughout session. Patient Education   Cont stretching program.   Pt verbalized/demonstrated good understanding:     [x] Yes         [] No, pt required further clarification.     Post Treatment Pain:  3/10      Plan  Times per week: 3  Plan weeks: 6      Goals  (Total # of Visits to Date: 3)   Short Term Goals - Time Frame for Short term goals: 3 weeks     Short term goal 1: Initiate HEP and progress as

## 2020-05-04 ENCOUNTER — HOSPITAL ENCOUNTER (OUTPATIENT)
Dept: PHYSICAL THERAPY | Age: 85
Setting detail: THERAPIES SERIES
Discharge: HOME | End: 2020-05-04
Payer: MEDICARE

## 2020-05-04 PROCEDURE — G0283 ELEC STIM OTHER THAN WOUND: HCPCS

## 2020-05-04 PROCEDURE — 97110 THERAPEUTIC EXERCISES: CPT

## 2020-05-04 PROCEDURE — 97035 APP MDLTY 1+ULTRASOUND EA 15: CPT

## 2020-05-04 PROCEDURE — 97140 MANUAL THERAPY 1/> REGIONS: CPT

## 2020-05-04 NOTE — PROGRESS NOTES
Phone: Brig[facility]           Fax: [phone]                           Outpatient Physical Therapy                                                                            Daily Note    Patient: Kaya Armstrong : 1935  [facility] #: [id number]   Referring Practitioner:  Sonia Erickson CNP    Referral Date : 20     Date: 2020    Diagnosis: Cervical neck pain with evidence of disc disease M50.90, BPPV H81.10  Treatment Diagnosis: cervicogenic dizziness, cervical pain    Onset Date: 20  PT Insurance Information: Medicare A and B  Total # of Visits Approved: 18 Per Physician Order  Total # of Visits to Date: 4  No Show: 0  Canceled Appointment: 0      Pre-Treatment Pain:  2/10  Subjective: Pt reports his neck continues to get better but he still is sore on R side greater than L. Pt rates current pain a 2/10. Exercises:  Exercise 2: Yes/ no 15x ea  Exercise 3: UT stretch with therapist assist  Exercise 4: eye habituation ex 15x ea  Exercise 5: Shrugs/ rolls/ retracs 15x ea   Exercise 6: UBE retro 3 mins. Exercise 7: HAB (supine) OTB 10x     Manual:  Manual traction: Soft manual traction, rotation stretch/ ut stretch. Modalities:  Moist heat: x15 neil UTs  Ultrasound: 1.2 W/cm2, 1 MHz, x8 min  E-stim (parameters): IFC x15 min to cervical/UT area for pain and tightness  -seated-     Assessment  Assessment: Minor dizzy symptoms reported with return form supine to sit. Neck pain continues to decrease with increased awareness to posture throughout session. Will cont to progress as tolerable. Patient Education   Cont HEP. Pt verbalized/demonstrated good understanding:     [x] Yes         [] No, pt required further clarification.     Post Treatment Pain:  1/10      Plan  Times per week: 3  Plan weeks: 6      Goals  (Total # of Visits to Date: 4)   Short Term Goals - Time Frame for Short term goals: 3 weeks     Short term goal 1: Initiate HEP and progress as tolerated                                        [x]Met   []Partially met  []Not met   Short term goal 2: Pt will report min to mod pain with general activity to improve tolerance to ADLs  []Met   []Partially met  [x]Not met   Short term goal 3: Pt will report improved mobility overall. []Met   []Partially met  [x]Not met      []Met  []Partially met  []Not met     Long Term Goals - Time Frame for Long term goals : 6 weeks  Long term goal 1: Pt will be independent and compliant with his HEP []Met  []Partially met  [x]Not met   Long term goal 2: Pt will report no greater than min pain level with day to day tasks.  []Met  []Partially met  [x]Not met   Long term goal 3: Pt will report at least 50% improvement regarding pain, CROM, and tolerance to upright for ADLs and driving []Met  []Partially met  [x]Not met   Long term goal 4: Pt gross cervical strength will be 3+ to 4-/5 without reports of fatigue and tolerance through the day with household activities []Met  []Partially met  [x]Not met     []Met  []Partially met  []Not met       Minutes Tracking:  Time In: 1101  Time Out: 1200  Minutes: 59  Timed Code Treatment Minutes: [address] [place]       Date: 5/4/2020

## 2020-05-06 ENCOUNTER — HOSPITAL ENCOUNTER (OUTPATIENT)
Dept: PHYSICAL THERAPY | Age: 85
Setting detail: THERAPIES SERIES
Discharge: HOME | End: 2020-05-06
Payer: MEDICARE

## 2020-05-06 PROCEDURE — 97140 MANUAL THERAPY 1/> REGIONS: CPT

## 2020-05-06 PROCEDURE — 97112 NEUROMUSCULAR REEDUCATION: CPT

## 2020-05-06 PROCEDURE — G0283 ELEC STIM OTHER THAN WOUND: HCPCS

## 2020-05-06 PROCEDURE — 97110 THERAPEUTIC EXERCISES: CPT

## 2020-05-06 NOTE — PROGRESS NOTES
Phone: [facility]           Fax: [phone]                           Outpatient Physical Therapy                                                                            Daily Note    Patient: Nataliia Nathan : 1935  CSN #: [id number]   Referring Practitioner:  Sherlyn Soriano CNP    Referral Date : 20     Date: 2020    Diagnosis: Cervical neck pain with evidence of disc disease M50.90, BPPV H81.10  Treatment Diagnosis: cervicogenic dizziness, cervical pain    Onset Date: 20  PT Insurance Information: Medicare A and B  Total # of Visits Approved: 18 Per Physician Order  Total # of Visits to Date: 5  No Show: 0  Canceled Appointment: 0      Pre-Treatment Pain:  2/10  Subjective: Pt states neck seems to be getting better. Pain comes and goes. Counts to 10 when transitioning out of bed due to dizziness. Exercises:  Exercise 5: Shrugs/ rolls/ retracs 15x ea   Exercise 6: UBE retro 6 mins. Exercise 8: **repositioning for left Hallpike x2    Manual:  Soft Tissue Mobalization: STM with heated probe in sitting    Modalities:  Moist heat: x15 neil UTs  E-stim (parameters): IFC x15 min to cervical/UT area for pain and tightness  -seated-       Assessment  Assessment: Pt with + left Hallpike with nystagmus noted. Mild difficulty with repositioning due to limited right c-spine rotation. Will continue. Patient Education  Patient Education: purpose of hallpike and treatment  Pt verbalized/demonstrated good understanding:     [x] Yes         [] No, pt required further clarification.     Post Treatment Pain:  2/10      Plan  Times per week: 3  Plan weeks: 6      Goals  (Total # of Visits to Date: 5)   Short Term Goals - Time Frame for Short term goals: 3 weeks     Short term goal 1: Initiate HEP and progress as tolerated - met                                        []Met   []Partially met  []Not met   Short term goal 2: Pt will report min to mod pain with

## 2020-05-08 ENCOUNTER — HOSPITAL ENCOUNTER (OUTPATIENT)
Dept: PHYSICAL THERAPY | Age: 85
Setting detail: THERAPIES SERIES
Discharge: HOME | End: 2020-05-08
Payer: MEDICARE

## 2020-05-08 PROCEDURE — 97140 MANUAL THERAPY 1/> REGIONS: CPT

## 2020-05-08 PROCEDURE — 97110 THERAPEUTIC EXERCISES: CPT

## 2020-05-08 PROCEDURE — G0283 ELEC STIM OTHER THAN WOUND: HCPCS

## 2020-05-08 NOTE — PROGRESS NOTES
Phone: [facility]           Fax: [phone]                           Outpatient Physical Therapy                                                                            Daily Note    Patient: Isis Briones : 1935  CSN #: [id number]   Referring Practitioner:  Jordan Vera CNP    Referral Date : 20     Date: 2020    Diagnosis: Cervical neck pain with evidence of disc disease M50.90, BPPV H81.10  Treatment Diagnosis: cervicogenic dizziness, cervical pain    Onset Date: 20  PT Insurance Information: Medicare A and B  Total # of Visits Approved: 18 Per Physician Order  Total # of Visits to Date: 6  No Show: 0  Canceled Appointment: 0      Pre-Treatment Pain:  0/10  Subjective: Pt reports his neck feels a \"little tough\" today. Pt states he is a little sore/stiff on R side of neck. Pt reports he has shoveled dirt some over the past few days. He states he isnt getting into a 1-10 scale it just hurts. Exercises:  Exercise 1: **HEP  Exercise 2: Yes/ no 15x ea  Exercise 3: UT stretch with therapist assist  Exercise 5: Shrugs/ rolls/ retracs 15x ea   Exercise 6: UBE retro 6 mins. Exercise 7: HAB (supine) OTB 10x   Exercise 8: **repositioning for left Hallpike x2    Manual:  Manual traction: Soft manual traction, rotation stretch/ ut stretch. Soft Tissue Mobalization: STM with heated probe in sitting    Modalities:  Moist heat: x15 neil UTs  E-stim (parameters): IFC x15 min to cervical/UT area for pain and tightness  -seated-     Assessment  Assessment: No nystagmus noted today with lazaro hallpike. Continued work on CROM/ neck strengthening. Patient Education   Cont current HEP. Pt verbalized/demonstrated good understanding:     [x] Yes         [] No, pt required further clarification.     Post Treatment Pain:  1/10      Plan  Times per week: 3  Plan weeks: 6      Goals  (Total # of Visits to Date: 6)   Short Term Goals - Time Frame for Short term goals: 3 weeks     Short term goal 1: Initiate HEP and progress as tolerated - met                                        [x]Met   []Partially met  []Not met   Short term goal 2: Pt will report min to mod pain with general activity to improve tolerance to ADLs - met  [x]Met   []Partially met  []Not met   Short term goal 3: Pt will report improved mobility overall. - met  [x]Met  []Partially met  []Not met      []Met   []Partially met  []Not met     Long Term Goals - Time Frame for Long term goals : 6 weeks  Long term goal 1: Pt will be independent and compliant with his HEP []Met  []Partially met  [x]Not met   Long term goal 2: Pt will report no greater than min pain level with day to day tasks.  []Met  []Partially met  [x]Not met   Long term goal 3: Pt will report at least 50% improvement regarding pain, CROM, and tolerance to upright for ADLs and driving []Met  []Partially met  [x]Not met   Long term goal 4: Pt gross cervical strength will be 3+ to 4-/5 without reports of fatigue and tolerance through the day with household activities []Met  []Partially met  [x]Not met     []Met  []Partially met  []Not met       Minutes Tracking:  Time In: 1234  Time Out: 1330  Minutes: 56  Timed Code Treatment Minutes: [place], [place]       Date: 5/8/2020

## 2020-05-11 ENCOUNTER — HOSPITAL ENCOUNTER (OUTPATIENT)
Dept: PHYSICAL THERAPY | Age: 85
Setting detail: THERAPIES SERIES
Discharge: HOME | End: 2020-05-11
Payer: MEDICARE

## 2020-05-11 PROCEDURE — G0283 ELEC STIM OTHER THAN WOUND: HCPCS

## 2020-05-11 PROCEDURE — 97140 MANUAL THERAPY 1/> REGIONS: CPT

## 2020-05-11 PROCEDURE — 97110 THERAPEUTIC EXERCISES: CPT

## 2020-05-11 NOTE — PROGRESS NOTES
met  []Not met   Short term goal 2: Pt will report min to mod pain with general activity to improve tolerance to ADLs - met  [x]Met   []Partially met  []Not met   Short term goal 3: Pt will report improved mobility overall. - met  [x]Met   []Partially met  []Not met      []Met   []Partially met  []Not met     Long Term Goals - Time Frame for Long term goals : 6 weeks  Long term goal 1: Pt will be independent and compliant with his HEP []Met  []Partially met  [x]Not met   Long term goal 2: Pt will report no greater than min pain level with day to day tasks.  []Met  []Partially met  [x]Not met   Long term goal 3: Pt will report at least 50% improvement regarding pain, CROM, and tolerance to upright for ADLs and driving []Met  []Partially met  [x]Not met   Long term goal 4: Pt gross cervical strength will be 3+ to 4-/5 without reports of fatigue and tolerance through the day with household activities []Met  []Partially met  [x]Not met     []Met  []Partially met  []Not met       Minutes Tracking:  Time In: 0931  Time Out: 1030  Minutes: 59  Timed Code Treatment Minutes: 65 Naheed Sp[address] [place]       Date: 5/11/2020

## 2020-05-13 ENCOUNTER — HOSPITAL ENCOUNTER (OUTPATIENT)
Dept: PHYSICAL THERAPY | Age: 85
Setting detail: THERAPIES SERIES
Discharge: HOME | End: 2020-05-13
Payer: MEDICARE

## 2020-05-15 ENCOUNTER — HOSPITAL ENCOUNTER (OUTPATIENT)
Dept: PHYSICAL THERAPY | Age: 85
Setting detail: THERAPIES SERIES
Discharge: HOME | End: 2020-05-15
Payer: MEDICARE

## 2020-05-15 PROCEDURE — 97110 THERAPEUTIC EXERCISES: CPT

## 2020-05-15 PROCEDURE — 97140 MANUAL THERAPY 1/> REGIONS: CPT

## 2020-05-15 PROCEDURE — G0283 ELEC STIM OTHER THAN WOUND: HCPCS

## 2020-05-15 NOTE — PROGRESS NOTES
1: Initiate HEP and progress as tolerated - met                                        [x]Met   []Partially met  []Not met   Short term goal 2: Pt will report min to mod pain with general activity to improve tolerance to ADLs - met  [x]Met  []Partially met  []Not met   Short term goal 3: Pt will report improved mobility overall. - met  [x]Met   []Partially met  []Not met      []Met   []Partially met  []Not met     Long Term Goals - Time Frame for Long term goals : 6 weeks  Long term goal 1: Pt will be independent and compliant with his HEP []Met  []Partially met  [x]Not met   Long term goal 2: Pt will report no greater than min pain level with day to day tasks.  []Met  []Partially met  [x]Not met   Long term goal 3: Pt will report at least 50% improvement regarding pain, CROM, and tolerance to upright for ADLs and driving []Met  []Partially met  [x]Not met   Long term goal 4: Pt gross cervical strength will be 3+ to 4-/5 without reports of fatigue and tolerance through the day with household activities []Met  []Partially met  [x]Not met     []Met  []Partially met  []Not met       Minutes Tracking:  Time In: 1231  Time Out: 1329  Minutes: 58  Timed Code Treatment Minutes: [address], [place]       Date: 5/15/2020

## 2020-05-18 ENCOUNTER — HOSPITAL ENCOUNTER (OUTPATIENT)
Dept: PHYSICAL THERAPY | Age: 85
Setting detail: THERAPIES SERIES
Discharge: HOME | End: 2020-05-18
Payer: MEDICARE

## 2020-05-18 PROCEDURE — 97140 MANUAL THERAPY 1/> REGIONS: CPT

## 2020-05-18 PROCEDURE — 97110 THERAPEUTIC EXERCISES: CPT

## 2020-05-18 PROCEDURE — G0283 ELEC STIM OTHER THAN WOUND: HCPCS

## 2020-05-20 ENCOUNTER — HOSPITAL ENCOUNTER (OUTPATIENT)
Dept: PHYSICAL THERAPY | Age: 85
Setting detail: THERAPIES SERIES
Discharge: HOME | End: 2020-05-20
Payer: MEDICARE

## 2020-05-20 PROCEDURE — G0283 ELEC STIM OTHER THAN WOUND: HCPCS

## 2020-05-20 PROCEDURE — 97140 MANUAL THERAPY 1/> REGIONS: CPT

## 2020-05-20 PROCEDURE — 97110 THERAPEUTIC EXERCISES: CPT

## 2020-05-20 NOTE — PROGRESS NOTES
Time Frame for Short term goals: 3 weeks     Short term goal 1: Initiate HEP and progress as tolerated - met                                        []Met   []Partially met  []Not met   Short term goal 2: Pt will report min to mod pain with general activity to improve tolerance to ADLs - met  []Met   []Partially met  []Not met   Short term goal 3: Pt will report improved mobility overall. - met  []Met   []Partially met  []Not met      []Met  []Partially met  []Not met     Long Term Goals - Time Frame for Long term goals : 6 weeks  Long term goal 1: Pt will be independent and compliant with his HEP []Met  []Partially met  []Not met   Long term goal 2: Pt will report no greater than min pain level with day to day tasks.  []Met  []Partially met  []Not met   Long term goal 3: Pt will report at least 50% improvement regarding pain, CROM, and tolerance to upright for ADLs and driving []Met  []Partially met  []Not met   Long term goal 4: Pt gross cervical strength will be 3+ to 4-/5 without reports of fatigue and tolerance through the day with household activities []Met  []Partially met  []Not met     []Met  []Partially met  []Not met       Minutes Tracking:  Time In: 1002  Time Out: 1104  Minutes: 62  Timed Code Treatment Minutes: James Unger 61 , PT, DPT, CMPT      Date: 5/20/2020

## 2020-05-22 ENCOUNTER — HOSPITAL ENCOUNTER (OUTPATIENT)
Dept: PHYSICAL THERAPY | Age: 85
Setting detail: THERAPIES SERIES
Discharge: HOME | End: 2020-05-22
Payer: MEDICARE

## 2020-05-22 PROCEDURE — 97140 MANUAL THERAPY 1/> REGIONS: CPT

## 2020-05-22 PROCEDURE — G0283 ELEC STIM OTHER THAN WOUND: HCPCS

## 2020-05-22 PROCEDURE — 97110 THERAPEUTIC EXERCISES: CPT

## 2020-05-27 ENCOUNTER — APPOINTMENT (OUTPATIENT)
Dept: PHYSICAL THERAPY | Age: 85
End: 2020-05-27
Payer: MEDICARE

## 2020-05-29 ENCOUNTER — HOSPITAL ENCOUNTER (OUTPATIENT)
Dept: PHYSICAL THERAPY | Age: 85
Setting detail: THERAPIES SERIES
Discharge: HOME | End: 2020-05-29
Payer: MEDICARE

## 2020-05-29 PROCEDURE — 97110 THERAPEUTIC EXERCISES: CPT

## 2020-05-29 PROCEDURE — G0283 ELEC STIM OTHER THAN WOUND: HCPCS

## 2020-05-29 PROCEDURE — 97140 MANUAL THERAPY 1/> REGIONS: CPT

## 2020-06-01 ENCOUNTER — HOSPITAL ENCOUNTER (OUTPATIENT)
Dept: PHYSICAL THERAPY | Age: 85
Setting detail: THERAPIES SERIES
Discharge: HOME | End: 2020-06-01
Payer: MEDICARE

## 2020-06-01 PROCEDURE — 97110 THERAPEUTIC EXERCISES: CPT

## 2020-06-01 PROCEDURE — G0283 ELEC STIM OTHER THAN WOUND: HCPCS

## 2020-06-01 PROCEDURE — 97140 MANUAL THERAPY 1/> REGIONS: CPT

## 2020-06-03 ENCOUNTER — HOSPITAL ENCOUNTER (OUTPATIENT)
Dept: PHYSICAL THERAPY | Age: 85
Setting detail: THERAPIES SERIES
Discharge: HOME | End: 2020-06-03
Payer: MEDICARE

## 2020-06-03 PROCEDURE — 97110 THERAPEUTIC EXERCISES: CPT

## 2020-06-03 PROCEDURE — 97140 MANUAL THERAPY 1/> REGIONS: CPT

## 2020-06-04 NOTE — DISCHARGE SUMMARY
term goal 2: Pt will report no greater than min pain level with day to day tasks.  - met  Long term goal 3: Pt will report at least 50% improvement regarding pain, CROM, and tolerance to upright for ADLs and driving - met for pain, ROM not met  Long term goal 4: Pt gross cervical strength will be 3+ to 4-/5 without reports of fatigue and tolerance through the day with household activities - met      Reason for Discharge  [] Goals Achieved                        []  Poor Follow Through/Attendance                  [x]  Optimal Function Achieved     [x]  Patient Discharged Self    []  Hospitalization                         []  Physician discharge      Thank you for this referral      Pam Doan PT, DPT, CMPT               Date: 6/3/2020

## 2020-06-04 NOTE — PROGRESS NOTES
Phone: [facility]           Fax: [phone]                           Outpatient Physical Therapy                                                                            Daily Note    Patient: Nikolay Awan : 1935  CSN #: [id number]   Referring Practitioner:  Cece Leiva CNP    Referral Date : 20     Date: 6/3/2020    Diagnosis: Cervical neck pain with evidence of disc disease M50.90, BPPV H81.10  Treatment Diagnosis: cervicogenic dizziness, cervical pain    Onset Date: 20  PT Insurance Information: Medicare Advantage  Total # of Visits Approved: 18 Per Physician Order  Total # of Visits to Date: 14  No Show: 0  Canceled Appointment: 1      Pre-Treatment Pain:  0.5/10  Subjective: Pt states he is doing well and would like to DC therapy. Pain today rates only 0.5/10. Feels like moving head more but still limited right rot. Exercises:  Exercise 5: Shrugs/ rolls/ retracs 15x ea 2#  Exercise 6: UBE retro 8 mins. Exercise 7: HAB (sitting) OTB 10x2  Exercise 9: 90/90 1# - 15x2  Exercise 10: LAE/ Rows blue TB 15x2    Manual:  Joint mobilization: grade III in supine  Manual traction: Soft manual traction, rotation stretch/ ut stretch. Assessment  Assessment: Pt has completed 14 PT visits for neck pain and dizziness. Right rotation currently 44 degrees, 62 degrees left. Strength with MMT is grossly 4-/5. Pt states no longer having headaches and dizziness is infrequent and mild. We will now discharge per pt request.     Activity Tolerance  Activity Tolerance: Patient Tolerated treatment well    Patient Education  Patient Education: tbands for HEP, discharge  Pt verbalized/demonstrated good understanding:     [x] Yes         [] No, pt required further clarification.     Post Treatment Pain:  0/10      Plan  Times per week: 3  Plan weeks: 6      Goals  (Total # of Visits to Date: 15)   Short Term Goals - Time Frame for Short term goals: 3 weeks

## 2020-06-05 ENCOUNTER — APPOINTMENT (OUTPATIENT)
Dept: PHYSICAL THERAPY | Age: 85
End: 2020-06-05
Payer: MEDICARE

## 2020-06-08 ENCOUNTER — APPOINTMENT (OUTPATIENT)
Dept: PHYSICAL THERAPY | Age: 85
End: 2020-06-08
Payer: MEDICARE

## 2020-06-10 ENCOUNTER — APPOINTMENT (OUTPATIENT)
Dept: PHYSICAL THERAPY | Age: 85
End: 2020-06-10
Payer: MEDICARE

## 2020-07-07 ENCOUNTER — HOSPITAL ENCOUNTER (OUTPATIENT)
Dept: NON INVASIVE DIAGNOSTICS | Age: 85
Discharge: HOME | End: 2020-07-07
Payer: MEDICARE

## 2020-07-07 ENCOUNTER — OFFICE VISIT (OUTPATIENT)
Dept: CARDIOLOGY | Age: 85
End: 2020-07-07
Payer: MEDICARE

## 2020-07-07 VITALS
OXYGEN SATURATION: 98 % | DIASTOLIC BLOOD PRESSURE: 70 MMHG | SYSTOLIC BLOOD PRESSURE: 120 MMHG | HEART RATE: 68 BPM | BODY MASS INDEX: 26.37 KG/M2 | RESPIRATION RATE: 18 BRPM | HEIGHT: 68 IN | WEIGHT: 174 LBS

## 2020-07-07 PROCEDURE — 99214 OFFICE O/P EST MOD 30 MIN: CPT | Performed by: INTERNAL MEDICINE

## 2020-07-07 PROCEDURE — 1123F ACP DISCUSS/DSCN MKR DOCD: CPT | Performed by: INTERNAL MEDICINE

## 2020-07-07 PROCEDURE — 93010 ELECTROCARDIOGRAM REPORT: CPT | Performed by: INTERNAL MEDICINE

## 2020-07-07 PROCEDURE — 4040F PNEUMOC VAC/ADMIN/RCVD: CPT | Performed by: INTERNAL MEDICINE

## 2020-07-07 PROCEDURE — 93225 XTRNL ECG REC<48 HRS REC: CPT

## 2020-07-07 PROCEDURE — 93005 ELECTROCARDIOGRAM TRACING: CPT | Performed by: INTERNAL MEDICINE

## 2020-07-07 PROCEDURE — G8417 CALC BMI ABV UP PARAM F/U: HCPCS | Performed by: INTERNAL MEDICINE

## 2020-07-07 PROCEDURE — G8427 DOCREV CUR MEDS BY ELIG CLIN: HCPCS | Performed by: INTERNAL MEDICINE

## 2020-07-07 PROCEDURE — 1036F TOBACCO NON-USER: CPT | Performed by: INTERNAL MEDICINE

## 2020-07-07 RX ORDER — AMLODIPINE BESYLATE 2.5 MG/1
2.5 TABLET ORAL DAILY
Qty: 30 TABLET | Refills: 3 | Status: SHIPPED | OUTPATIENT
Start: 2020-07-07 | End: 2020-10-26 | Stop reason: SDUPTHER

## 2020-07-07 RX ORDER — NITROGLYCERIN 0.4 MG/1
0.4 TABLET SUBLINGUAL EVERY 5 MIN PRN
Qty: 25 TABLET | Refills: 2 | Status: SHIPPED | OUTPATIENT
Start: 2020-07-07 | End: 2020-10-26 | Stop reason: SDUPTHER

## 2020-07-07 RX ORDER — METOPROLOL SUCCINATE 25 MG/1
12.5 TABLET, EXTENDED RELEASE ORAL DAILY
Qty: 30 TABLET | Refills: 3 | Status: SHIPPED | OUTPATIENT
Start: 2020-07-07 | End: 2020-10-26 | Stop reason: SDUPTHER

## 2020-07-07 NOTE — PROGRESS NOTES
test 2017    Arrhythmia     ASHD (arteriosclerotic heart disease)     CKD (chronic kidney disease)     Closed traumatic fracture of right femur with routine healing 1955    trauma while in [employer], plate and scews placed and removed    COPD (chronic obstructive pulmonary disease) (HCC)     Elevated PSA     Enlarged prostate     Gallstones     Gout     History of kidney stones     Hydrocele in adult     Hyperlipidemia     Hypertension     Renal stones     Trigger finger     Vertigo     Vertigo     Wrist fracture, left        CURRENT ALLERGIES: Penicillins REVIEW OF SYSTEMS: 14 systems were reviewed. Pertinent positives and negatives as above, all else negative. Past Surgical History:   Procedure Laterality Date    APPENDECTOMY      20 yo    CARDIAC CATHETERIZATION Left 2019    Right [facility]    plate and screws s/p trauma  and taken out in [address] [place] [address]  2012    LITHOTRIPSY      PROSTATE BIOPSY  10/22/1998, 2004    TOTAL HIP ARTHROPLASTY Right     Social History:  Social History     Tobacco Use    Smoking status: Former Smoker     Packs/day: 1.00     Years: 25.00     Pack years: 25.00     Last attempt to quit: 1974     Years since quittin.4    Smokeless tobacco: Never Used   Substance Use Topics    Alcohol use: No     Frequency: Never     Binge frequency: Never     Comment: quit 38 years ago.     Drug use: No        CURRENT MEDICATIONS:        Outpatient Medications Marked as Taking for the 20 encounter (Office Visit) with Maris Ho MD   Medication Sig Dispense Refill    zinc gluconate 50 MG tablet Take 50 mg by mouth daily      metoprolol tartrate (LOPRESSOR) 25 MG tablet Take 1 tablet by mouth 2 times daily 180 tablet 1    amLODIPine (NORVASC) 5 MG tablet Take 1 tablet by mouth daily 90 tablet 1    clopidogrel (PLAVIX) 75 MG tablet Take 1 tablet by mouth daily 90 tablet 3    Omega-3 Fatty Acids (FISH OIL) 1000 MG CAPS Take 3,000 mg by mouth daily       nitroGLYCERIN (NITROSTAT) 0.4 MG SL tablet Place 1 tablet under the tongue every 5 minutes as needed for Chest pain 25 tablet 0    aspirin 81 MG EC tablet Take 81 mg by mouth daily. FAMILY HISTORY: family history includes Arrhythmia in his sister; Heart Attack in his father and mother; Heart Disease in his father and mother; Hypertension in his mother; Kidney Disease in his brother; No Known Problems in his brother; Parkinsonism in his father. Physical Examination:      Resp 18   Ht 5' 7.99\" (1.727 m)   Wt 174 lb (78.9 kg)   BMI 26.46 kg/m²  Body mass index is 26.46 kg/m². Constitutional: He is oriented to person. He appears well-developed and well-nourished. In no acute distress. HEENT: Normocephalic and atraumatic. No JVD present. Carotid bruit is not present. No mass and no thyromegaly present. No lymphadenopathy present. Cardiovascular: Normal rate, regular rhythm, normal heart sounds. Exam reveals no gallop and no friction rubs. No heart murmur heard. Pulmonary/Chest: Effort normal and breath sounds normal. No respiratory distress. He has no wheezes, rhonchi or rales. Abdominal: Soft, non-tender. Bowel sounds and aorta are normal. He exhibits no organomegaly, mass or bruit. Extremities: No edema. No cyanosis and no clubbing. Pulses are 2+ radial and carotid pulses. 2+ dorsalis pedis and posterior tibial pulses bilaterally. Neurological: He is alert and oriented to person. No evidence of gross cranial nerve deficit. Coordination appeared normal.   Skin: Skin is warm and dry. There is no rash or diaphoresis. Psychiatric: He has a normal mood and affect.  His speech is normal and behavior is normal.      MOST RECENT LABS ON RECORD:   Lab Results   Component Value Date    WBC 6.6 08/31/2019    HGB 13.8 08/31/2019    HCT 41.5 08/31/2019     08/31/2019    CHOL 138 (L) 08/31/2019    TRIG 114 08/31/2019    HDL 40 08/31/2019    ALT 12 03/19/2020    AST 21 03/19/2020     04/15/2020    K 4.3 04/15/2020     04/15/2020    CREATININE 1.69 (H) 04/15/2020    BUN 31 (H) 04/15/2020    CO2 25 04/15/2020    PSA 9.27 (H) 07/18/2012    LABA1C 5.4 01/21/2019       ASSESSMENT:     1. Coronary artery disease involving native coronary artery of native heart without angina pectoris    2. S/P angioplasty with stent    3. Lightheadedness    4. SOB (shortness of breath)    5. Dyslipidemia    6. Essential hypertension       PLAN:        · Lightheadedness/dizziness:   · He does suffer from intermittent vertigo episodes but I am concerned about cardiac etiology of his current symptoms. · His ECG showing sinus rhythm with long first-degree AV block and APCs. · I will order 48-hour Holter monitor  · I will call ultrasound to rule out vertebrobasilar insufficiency. · I will decrease his beta-blocker therapy to Toprol-XL 12.5 mg daily. · Decrease amlodipine to 2.5 mg daily. · Nonpharmacologic counseling: Because of his condition, I reminded him to try and keep himself well-hydrated and to take extra time when moving from laying to sitting, sitting to standing and standing to walking. I also explained to him to help improve his symptoms he should include  1 or 2 L of sports drinks daily, knee-high compressions stockings. · Continue vestibular rehab. · Atherosclerotic Heart Disease: S/P Stents done on 3/28 by Dr. Tracy Spurling Antiplatelet Agent: Continue aspirin 81 mg daily and Plavix 75 mg daily.  Beta Blocker: Change Lopressor to Toprol-XL 12.5 mg daily.  Anti-anginal medications: DECREASE to amlodipine (Norvasc) 5 mg 1/2 tab once daily.  Cholesterol Reduction Therapy: Continue Atorvastatin (Lipitor) 40 mg daily.  Check CBC, basic metabolic panel, BNP and lipid profile.       Essential Hypertension: Controlled   · Beta Blocker: Continue Toprol-XL as above. · Continue amlodipine 2.5 mg daily. · Hyperlipidemia: Mixed, LDL done on 8/31/2019 was 75 mg/dL   · Cholesterol Reduction Therapy: Continue Atorvastatin (Lipitor) 40 mg daily. · Laboratory testing: Ordered a Lipid Panel for him to get done. FOLLOW UP:   I told Mr. Jolly Alaniz to call my office if he had any problems, but otherwise I asked him to Return in about 2 weeks (around 7/21/2020) for Follow up. However, I would be happy to see him sooner should the need arise. Sincerely,  Broderick Santos MD, F.A.C.C. [facility] Cardiology Specialist    [address] [facility], [address] [place] [address]  Phone: [phone], Fax: [phone]     I believe that the risk of significant morbidity and mortality related to the patient's current medical conditions are: intermediate-high.

## 2020-07-08 LAB
ABSOLUTE BASO #: 0 X10E9/L (ref 0–0.9)
ABSOLUTE EOS #: 0.2 X10E9/L (ref 0–0.4)
ABSOLUTE LYMPH #: 1.5 X10E9/L (ref 1–3.5)
ABSOLUTE MONO #: 0.4 X10E9/L (ref 0–0.9)
ABSOLUTE NEUT #: 4.6 X10E9/L (ref 1.5–6.6)
ANION GAP SERPL CALCULATED.3IONS-SCNC: 10 MMOL/L (ref 5–15)
B-TYPE NATRIURETIC PEPTIDE: 308 PG/ML
BASOPHILS RELATIVE PERCENT: 0.5 %
BUN BLDV-MCNC: 31 MG/DL (ref 5–27)
CALCIUM SERPL-MCNC: 9.7 MG/DL (ref 8.5–10.5)
CHLORIDE BLD-SCNC: 108 MMOL/L (ref 98–109)
CHOLESTEROL/HDL RATIO: 5.8 (ref 1–5)
CHOLESTEROL: 209 MG/DL (ref 150–200)
CO2: 25 MMOL/L (ref 22–32)
CREAT SERPL-MCNC: 1.7 MG/DL (ref 0.6–1.3)
EGFR AFRICAN AMERICAN: 47 ML/MIN/1.73SQ.M
EGFR IF NONAFRICAN AMERICAN: 39 ML/MIN/1.73SQ.M
EOSINOPHILS RELATIVE PERCENT: 3.6 %
GLUCOSE: 99 MG/DL (ref 65–99)
HCT VFR BLD CALC: 43 % (ref 36–53)
HDLC SERPL-MCNC: 36 MG/DL
HEMOGLOBIN: 14.2 G/DL (ref 12.6–17.4)
LDL CHOLESTEROL CALCULATED: 139 MG/DL
LDL/HDL RATIO: 3.9
LYMPHOCYTE %: 21.8 %
MCH RBC QN AUTO: 28.1 PG (ref 27–35)
MCHC RBC AUTO-ENTMCNC: 33.1 G/DL (ref 31–36)
MCV RBC AUTO: 85 FL (ref 81–101)
MONOCYTES # BLD: 6.1 %
NEUTROPHILS RELATIVE PERCENT: 68 %
PDW BLD-RTO: 13.8 % (ref 11.4–14.3)
PLATELETS: 232 X10E9/L (ref 150–450)
PMV BLD AUTO: 8.8 FL (ref 7–12)
POTASSIUM SERPL-SCNC: 4.9 MMOL/L (ref 3.5–5)
RBC: 5.07 X10E12/L (ref 3.3–5.5)
SODIUM BLD-SCNC: 143 MMOL/L (ref 134–146)
TRIGL SERPL-MCNC: 172 MG/DL (ref 27–150)
VLDLC SERPL CALC-MCNC: 34 MG/DL (ref 0–30)
WBC: 6.8 X10E9/L (ref 4.4–12)

## 2020-07-09 ENCOUNTER — TELEPHONE (OUTPATIENT)
Dept: CARDIOLOGY | Age: 85
End: 2020-07-09

## 2020-07-13 LAB
ACQUISITION DURATION: NORMAL S
AVERAGE HEART RATE: 65 BPM
EKG DIAGNOSIS: NORMAL
FASTEST VENTRICULAR RATE: 92 BPM
HOLTER MAX HEART RATE: 118 BPM
HOOKUP DATE: NORMAL
HOOKUP TIME: NORMAL
LONGEST VE RUN RATE: 92 BPM
MAX HEART RATE TIME/DATE: NORMAL
MIN HEART RATE TIME/DATE: NORMAL
MIN HEART RATE: 43 BPM
NUMBER OF FASTEST VENTRICULAR BEATS: 3
NUMBER OF LONGEST VENTRICULAR BEATS: 3
NUMBER OF QRS COMPLEXES: NORMAL
NUMBER OF SUPRAVENTRICULAR BEATS IN RUNS: 0
NUMBER OF SUPRAVENTRICULAR COUPLETS: 0
NUMBER OF SUPRAVENTRICULAR ECTOPICS: 122
NUMBER OF SUPRAVENTRICULAR ISOLATED BEATS: 122
NUMBER OF SUPRAVENTRICULAR RUNS: 0
NUMBER OF VENTRICULAR BEATS IN RUNS: 3
NUMBER OF VENTRICULAR BIGEMINAL CYCLES: 6
NUMBER OF VENTRICULAR COUPLETS: 11
NUMBER OF VENTRICULAR ECTOPICS: 668
NUMBER OF VENTRICULAR ISOLATED BEATS: 642
NUMBER OF VENTRICULAR RUNS: 1

## 2020-07-14 ENCOUNTER — HOSPITAL ENCOUNTER (OUTPATIENT)
Dept: VASCULAR LAB | Age: 85
Discharge: HOME | End: 2020-07-16
Payer: MEDICARE

## 2020-07-14 PROCEDURE — 93880 EXTRACRANIAL BILAT STUDY: CPT

## 2020-07-15 ENCOUNTER — TELEPHONE (OUTPATIENT)
Dept: CARDIOLOGY | Age: 85
End: 2020-07-15

## 2020-07-16 ENCOUNTER — TELEPHONE (OUTPATIENT)
Dept: CARDIOLOGY | Age: 85
End: 2020-07-16

## 2020-07-16 NOTE — TELEPHONE ENCOUNTER
----- Message from Caro Edgar MD sent at 7/16/2020  8:17 AM EDT -----  Heart monitor is okay. Continue current therapy and follow-up. We will discuss details on follow-up.   Thank you

## 2020-07-27 ENCOUNTER — OFFICE VISIT (OUTPATIENT)
Dept: CARDIOLOGY | Age: 85
End: 2020-07-27
Payer: MEDICARE

## 2020-07-27 VITALS
OXYGEN SATURATION: 99 % | DIASTOLIC BLOOD PRESSURE: 71 MMHG | BODY MASS INDEX: 25.91 KG/M2 | HEIGHT: 68 IN | WEIGHT: 171 LBS | RESPIRATION RATE: 18 BRPM | HEART RATE: 61 BPM | SYSTOLIC BLOOD PRESSURE: 137 MMHG

## 2020-07-27 PROCEDURE — 99211 OFF/OP EST MAY X REQ PHY/QHP: CPT | Performed by: INTERNAL MEDICINE

## 2020-07-27 PROCEDURE — G8417 CALC BMI ABV UP PARAM F/U: HCPCS | Performed by: INTERNAL MEDICINE

## 2020-07-27 PROCEDURE — 4040F PNEUMOC VAC/ADMIN/RCVD: CPT | Performed by: INTERNAL MEDICINE

## 2020-07-27 PROCEDURE — 1123F ACP DISCUSS/DSCN MKR DOCD: CPT | Performed by: INTERNAL MEDICINE

## 2020-07-27 PROCEDURE — 1036F TOBACCO NON-USER: CPT | Performed by: INTERNAL MEDICINE

## 2020-07-27 PROCEDURE — 99214 OFFICE O/P EST MOD 30 MIN: CPT | Performed by: INTERNAL MEDICINE

## 2020-07-27 PROCEDURE — G8427 DOCREV CUR MEDS BY ELIG CLIN: HCPCS | Performed by: INTERNAL MEDICINE

## 2020-07-27 RX ORDER — EZETIMIBE 10 MG/1
10 TABLET ORAL DAILY
Qty: 90 TABLET | Refills: 3 | Status: SHIPPED | OUTPATIENT
Start: 2020-07-27 | End: 2021-08-03

## 2020-07-27 NOTE — PATIENT INSTRUCTIONS
SURVEY:    You may be receiving a survey from [employer] regarding your visit today. Please complete the survey to enable us to provide the highest quality of care to you and your family. If you cannot score us a very good on any question, please call the office to discuss how we could have made your experience a very good one. Thank you.

## 2020-07-27 NOTE — PROGRESS NOTES
Bhavik Sanchez am scribing for and in the presence of Damaris Painting MD, F.A.C.C..      Patient: Mauro Angel  : 1935  Date of Visit: 2020    REASON FOR VISIT / CONSULTATION: Follow-up (Hx: CAD s/p stent, lightheadd, SOB, HLD, HTN. Patient is feeling okay. Took nitro for lightheaded and weakness. Denies: CP, SOB, palpitations.)      History of Present Illness:        Dear Ariel Perez, APRN - CNP    I had the pleasure of seeing Mauro Angel in my office today. Mr. Ajith Nichols is a 80 y.o. male who presented for follow-up. He initially presented with a chest tightness. Subsequent cardiac catheterizations showed two-vessel coronary artery disease. Patient status post PCI to LAD and RCA 3/28/2019. He denies any prior history of myocardial infarction or heart failure. No significant heart rhythm problem. He does have a long history of high cholesterol. He is a past smoker and has quit over 40 years ago. His mother had heart problems unsure about fathers history. S/P Heart Cath done on 3/28/019, S/P PCI to mid LAD and mid RCA. ECG 20 showed sinus rhythm with APCs and long first-degree AV block. No acute ischemic changes. Holter monitor worn on 20: No heart rate or rhythm abnormalities that can clearly explain his dizziness. Normal sinus rhythm with rare PAC's and PVC's with 1 PVC triplet. Carotid ultrasound on 2020 showed mild bilateral internal carotid artery stenosis. Bilateral vertebral arteries were patent with antegrade flow. Mr. Ajith Nichols is here for a follow up today. He said he did take a Nitroglycerin this morning. He said his legs felt like jello. He said he also felt like he saw \" a thousand Lena lights flashing at him\" . He said it feels like a fuzziness like he cant concentrate. He said he felt better after taking the Nitro. He denies any chest pain, pressure or tightness. No significant shortness of breath. No palpitations.   Continues to have intermittent dizziness and lightheadedness even with decrease in medications. No falls, near falls. No presyncope or syncopal episode. No orthopnea or PND. No problems with his current medications. Exercise Tolerance: Mr. Yelena Pandey reports that he has a fairly good exercise tolerance. His says that he works an hour and rests two hours. He has been doing this since last visit. PAST MEDICAL HISTORY:        Past Medical History:   Diagnosis Date    Abnormal stress test     Arrhythmia     ASHD (arteriosclerotic heart disease)     CKD (chronic kidney disease)     Closed traumatic fracture of right femur with routine healing     trauma while in [employer], plate and scews placed and removed    COPD (chronic obstructive pulmonary disease) (HCC)     Elevated PSA     Enlarged prostate     Gallstones     Gout     History of kidney stones     Hydrocele in adult     Hyperlipidemia     Hypertension     Renal stones     Trigger finger     Vertigo     Vertigo     Wrist fracture, left        CURRENT ALLERGIES: Penicillins REVIEW OF SYSTEMS: 14 systems were reviewed. Pertinent positives and negatives as above, all else negative.      Past Surgical History:   Procedure Laterality Date    APPENDECTOMY      22 yo    CARDIAC CATHETERIZATION Left 2019    [facility] Radial[facility] : [facility]    plate and screws s/p trauma  and taken out in [address] [place] [address]  2012    LITHOTRIPSY      PROSTATE BIOPSY  10/22/1998, 2004    TOTAL HIP ARTHROPLASTY Right     Social History:  Social History     Tobacco Use    Smoking status: Former Smoker     Packs/day: 1.00     Years: 25.00     Pack years: 25.00     Last attempt to quit: 1974     Years since quittin.5    Smokeless tobacco: Never Used   Substance Use Topics    Alcohol use: No     Frequency: Never     Binge frequency: Never     Comment: quit 38 years ago.  Drug use: No        CURRENT MEDICATIONS:        Outpatient Medications Marked as Taking for the 7/27/20 encounter (Office Visit) with Merlinda Moan, MD   Medication Sig Dispense Refill    nitroGLYCERIN (NITROSTAT) 0.4 MG SL tablet Place 1 tablet under the tongue every 5 minutes as needed for Chest pain 25 tablet 2    amLODIPine (NORVASC) 2.5 MG tablet Take 1 tablet by mouth daily 30 tablet 3    metoprolol succinate (TOPROL XL) 25 MG extended release tablet Take 0.5 tablets by mouth daily 30 tablet 3    zinc gluconate 50 MG tablet Take 50 mg by mouth daily      clopidogrel (PLAVIX) 75 MG tablet Take 1 tablet by mouth daily 90 tablet 3    Omega-3 Fatty Acids (FISH OIL) 1000 MG CAPS Take 3,000 mg by mouth daily       aspirin 81 MG EC tablet Take 81 mg by mouth daily. FAMILY HISTORY: family history includes Arrhythmia in his sister; Heart Attack in his father and mother; Heart Disease in his father and mother; Hypertension in his mother; Kidney Disease in his brother; No Known Problems in his brother; Parkinsonism in his father. Physical Examination:      BP (!) 169/88 (Site: Right Upper Arm, Position: Sitting, Cuff Size: Medium Adult)   Pulse 78   Resp 18   Ht 5' 7.99\" (1.727 m)   Wt 171 lb (77.6 kg)   SpO2 99%   BMI 26.01 kg/m²  Body mass index is 26.01 kg/m². Constitutional: He is oriented to person. He appears well-developed and well-nourished. In no acute distress. HEENT: Normocephalic and atraumatic. No JVD present. Carotid bruit is not present. No mass and no thyromegaly present. No lymphadenopathy present. Cardiovascular: Normal rate, regular rhythm, normal heart sounds. Exam reveals no gallop and no friction rubs. No heart murmur heard. Pulmonary/Chest: Effort normal and breath sounds normal. No respiratory distress. He has no wheezes, rhonchi or rales. Abdominal: Soft, non-tender.  Bowel sounds and aorta are normal. He 2020

## 2020-08-17 ENCOUNTER — OFFICE VISIT (OUTPATIENT)
Dept: NEUROLOGY | Age: 85
End: 2020-08-17
Payer: MEDICARE

## 2020-08-17 VITALS
SYSTOLIC BLOOD PRESSURE: 128 MMHG | RESPIRATION RATE: 18 BRPM | BODY MASS INDEX: 25.66 KG/M2 | DIASTOLIC BLOOD PRESSURE: 65 MMHG | HEART RATE: 69 BPM | HEIGHT: 68 IN | WEIGHT: 169.3 LBS | TEMPERATURE: 97.5 F

## 2020-08-17 PROCEDURE — 1123F ACP DISCUSS/DSCN MKR DOCD: CPT | Performed by: NEUROMUSCULOSKELETAL MEDICINE, SPORTS MEDICINE

## 2020-08-17 PROCEDURE — 4040F PNEUMOC VAC/ADMIN/RCVD: CPT | Performed by: NEUROMUSCULOSKELETAL MEDICINE, SPORTS MEDICINE

## 2020-08-17 PROCEDURE — 1036F TOBACCO NON-USER: CPT | Performed by: NEUROMUSCULOSKELETAL MEDICINE, SPORTS MEDICINE

## 2020-08-17 PROCEDURE — G8427 DOCREV CUR MEDS BY ELIG CLIN: HCPCS | Performed by: NEUROMUSCULOSKELETAL MEDICINE, SPORTS MEDICINE

## 2020-08-17 PROCEDURE — 99213 OFFICE O/P EST LOW 20 MIN: CPT | Performed by: NEUROMUSCULOSKELETAL MEDICINE, SPORTS MEDICINE

## 2020-08-17 PROCEDURE — 99213 OFFICE O/P EST LOW 20 MIN: CPT

## 2020-08-17 PROCEDURE — G8417 CALC BMI ABV UP PARAM F/U: HCPCS | Performed by: NEUROMUSCULOSKELETAL MEDICINE, SPORTS MEDICINE

## 2020-08-17 NOTE — PROGRESS NOTES
NEUROLOGY Follow up    Patient Name:  Ubaldo Vela  :   1935  Clinic Visit Date: 2020    I saw Mr. Ubaldo Vela  in the neurology clinic today for follow-up on chronic pain and dizzy spells. He continues to have persistent intermittent neck pain but has improved slightly with the cervical range of motion after physical therapy. Continues to have intermittent dizziness and poor balance, presumed to be vestibular in etiology. He discontinued with physical therapy on his own because it \"was not helping anymore \". He apparently is looking for alternative treatment modalities including \"laser \"or \"stem cell therapy \". Overall I believe he is doing fairly well without any new or worsening neurological symptoms at this time. He has multiple other problems as noted below      REVIEW OF SYSTEMS    Constitutional Weight changes: absent, change in appetite: absent Fatigue: absent; Fevers : absent, Any recent hospitalizations:  absent   HEENT Ears: normal,  Visual disturbance: absent   Respiratory Shortness of breath: present, choking:  absent, Cough: absent, Snoring : absent   Cardiovascular Chest pain: absent, Leg swelling :absent, palpitations : absent, fainting : absent   GI Constipation: absent, Diarrhea: absent, Swallowing change: absent    Urinary frequency: absent, Urinary urgency: absent, Urinary incontinence: absent   Musculoskeletal Neck pain: present, Back pain: present, Stiffness: present, Muscle pain: present, Joint pain: present, restless leg : absent   Dermatological Hair loss: absent, Skin changes: absent   Neurological Confusion: absent, Trouble concentrating: absent, Seizures: absent;  Memory loss: absent, balance problem: present, Dizziness: present, vertigo: present, Weakness: present, Numbness absent, Tremor: absent, Spasm: absent, involuntary movement: absent, Speech difficulty: absent, Headache: present, Light sensitivity: absent   Psychiatric Anxiety: present, Depression  absent, drug abuse: absent, Hallucination: absent, mood disorder: absent, Suicidal ideations absent   Hematologic Abnormal bleeding: absent, Anemia: absent, Lymph gland changes: absent Clotting disorder: absent     Past Medical History:   Diagnosis Date    Abnormal stress test 2017    Arrhythmia     ASHD (arteriosclerotic heart disease)     CKD (chronic kidney disease)     Closed traumatic fracture of right femur with routine healing 1955    trauma while in [employer], plate and scews placed and removed    COPD (chronic obstructive pulmonary disease) ([facility] [address]     Elevated PSA     Enlarged prostate     Gallstones     Gout     History of kidney stones     Hydrocele in adult     Hyperlipidemia     Hypertension     Renal stones     Trigger finger     Vertigo     Vertigo     Wrist fracture, left        Past Surgical History:   Procedure Laterality Date    APPENDECTOMY      22 yo    CARDIAC CATHETERIZATION Left 2019    [facility] RCA and mid LAD [facility] Dr. Bernabe Rubio    plate and screws s/p trauma  and taken out in [address] [place] [address]  2012    LITHOTRIPSY      PROSTATE BIOPSY  10/22/1998, 2004    TOTAL HIP ARTHROPLASTY Right        Social History     Socioeconomic History    Marital status:      Spouse name: Not on file    Number of children: Not on file    Years of education: Not on file    Highest education level: Not on file   Occupational History    Not on file   Social Needs    Financial resource strain: Not hard at all   [address] insecurity     Worry: Sometimes true     Inability: Never true   [employer] needs     Medical: No     Non-medical: No   Tobacco Use    Smoking status: Former Smoker     Packs/day: 1.00     Years: 25.00     Pack years: 25.00     Last attempt to quit: 1974     Years since quittin.6    Smokeless tobacco: Never Used   Substance and Sexual Activity    Alcohol use: No     Frequency: Never     Binge frequency: Never     Comment: quit 38 years ago.  Drug use: No    Sexual activity: Yes     Partners: Female   Lifestyle    Physical activity     Days per week: 3 days     Minutes per session: 30 min    Stress: Only a little   Relationships    Social connections     Talks on phone: Once a week     Gets together: Once a week     Attends Shinto service: More than 4 times per year     Active member of club or organization: Yes     Attends meetings of clubs or organizations: More than 4 times per year     Relationship status:     Intimate partner violence     Fear of current or ex partner: No     Emotionally abused: No     Physically abused: No     Forced sexual activity: No   Other Topics Concern    Not on file   Social History Narrative    Not on file       Family History   Problem Relation Age of Onset    Heart Disease Mother     Hypertension Mother     Heart Attack Mother     Parkinsonism Father     Heart Attack Father     Heart Disease Father     Kidney Disease Brother         dialysis    Arrhythmia Sister     No Known Problems Brother        Current Outpatient Medications   Medication Sig Dispense Refill    ezetimibe (ZETIA) 10 MG tablet Take 1 tablet by mouth daily 90 tablet 3    nitroGLYCERIN (NITROSTAT) 0.4 MG SL tablet Place 1 tablet under the tongue every 5 minutes as needed for Chest pain 25 tablet 2    amLODIPine (NORVASC) 2.5 MG tablet Take 1 tablet by mouth daily 30 tablet 3    metoprolol succinate (TOPROL XL) 25 MG extended release tablet Take 0.5 tablets by mouth daily 30 tablet 3    zinc gluconate 50 MG tablet Take 50 mg by mouth daily      clopidogrel (PLAVIX) 75 MG tablet Take 1 tablet by mouth daily 90 tablet 3    Omega-3 Fatty Acids (FISH OIL) 1000 MG CAPS Take 3,000 mg by mouth daily       aspirin 81 MG EC tablet Take 81 mg by mouth daily.          No current facility-administered medications for this visit.              DATA:  Lab Results   Component Value Date    WBC 6.8 07/07/2020    HGB 14.2 07/07/2020     07/07/2020    CHOL 209 (H) 07/07/2020    TRIG 172 (H) 07/07/2020    HDL 36 (L) 07/07/2020    ALT 12 03/19/2020    AST 21 03/19/2020     07/07/2020    K 4.9 07/07/2020     07/07/2020    CREATININE 1.70 (H) 07/07/2020    BUN 31 (H) 07/07/2020    CO2 25 07/07/2020    LABA1C 5.4 01/21/2019       /65 (Site: Left Upper Arm, Position: Sitting, Cuff Size: Medium Adult)   Pulse 69   Temp 97.5 °F (36.4 °C) (Temporal)   Resp 18   Ht 5' 8\" (1.727 m)   Wt 169 lb 4.8 oz (76.8 kg)   BMI 25.74 kg/m²     NEUROLOGICAL EXAMINATION:     MENTAL STATUS: Patient is alert and oriented. There is no confusion or aphasia. Memory is normal.     CRANIAL NERVES: III_XII are normal.    MOTOR EXAMINATION: Muscle tone is normal in all the limbs. There is no focal weakness. Muscle strength is 5/5 in both upper and lower limbs. There are no abnormal limb movements. SENSORY EXAMINATION: Normal.     STRETCH REFLEXES: Symmetrical  in both the upper and lower limbs. GAIT: There is no ataxia. Age-appropriate gait. Moderate tenderness over the mid and lower cervical paraspinal muscles. Range of motion of the cervical spine has improved to the right after physical therapy    IMPRESSION:  Chronic neck pain and intermittent vertigo well secondary to underlying degenerative joint disease of cervical spine as well as cervicogenic cause for the dizzy spells. I have explained to him in detail that his  symptoms will be an ongoing chronic problem, and there is nothing much more I can do at this time for him    PLAN:    1. Recommend to continue exercises,  to improve cervical range of motion, on a daily basis, improve posture  2. Follow-up as needed    NOTE: This neurology evaluation is part of outpatient coverage at [facility]  1-2 days per week.   Patients requiring frequent evaluations or uncomfortable with potential 3-4 day turnaround on questions or calls  may be better served by a neurologist in the area full time. [facility]. Mona is available for outpatient visits and procedures including EMG/NCS. Non-[facility] neurologists also practice in [facility] (Dr. Gemma Reese) and [place] (Onel Kuo).        Khari Wills MD   8/17/2020  3:46 PM        CC: Pop PENG-CNP

## 2020-08-17 NOTE — PATIENT INSTRUCTIONS
SURVEY:    You may be receiving a survey from [facility] regarding your visit today. Please complete the survey to enable us to provide the highest quality of care to you and your family. If you cannot score us a very good on any question, please call the office to discuss how we could have made your experience a very good one. Thank you. Patient Education        Learning About Coronavirus [phone])  Coronavirus [phone]): Overview  What is coronavirus ([id number]-88)? The coronavirus disease (COVID-19) is caused by a virus. It is an illness that was first found in [place], [place], in December 2019. It has since spread worldwide. The virus can cause fever, cough, and trouble breathing. In severe cases, it can cause pneumonia and make it hard to breathe without help. It can cause death. Coronaviruses are a large group of viruses. They cause the common cold. They also cause more serious illnesses like Middle East respiratory syndrome (MERS) and severe acute respiratory syndrome (SARS). COVID-19 is caused by a novel coronavirus. That means it's a new type that has not been seen in people before. This virus spreads person-to-person through droplets from coughing and sneezing. It can also spread when you are close to someone who is infected. And it can spread when you touch something that has the virus on it, such as a doorknob or a tabletop. What can you do to protect yourself from coronavirus (COVID-19)? The best way to protect yourself from getting sick is to:  · Avoid areas where there is an outbreak. · Avoid contact with people who may be infected. · Wash your hands often with soap or alcohol-based hand sanitizers. · Avoid crowds and try to stay at least 6 feet away from other people. · Wash your hands often, especially after you cough or sneeze. Use soap and water, and scrub for at least 20 seconds. If soap and water aren't available, use an alcohol-based hand .   · Avoid touching your virus. Their websites contain the most up-to-date information. [facility] also learn what to do if you think you may have been exposed to the virus. · U.S. Centers for Disease Control and Prevention (CDC): The CDC provides updated news about the disease and travel advice. The website also tells you how to prevent the spread of infection. www.cdc.gov  · World Health Organization [facility]): WHO offers information about the virus outbreaks. WHO also has travel advice. www.who.int  Current as of: May 8, 2020               Content Version: 12.5  © 6314-9228 Healthwise, Incorporated. Care instructions adapted under license by [facility] ([facility]). If you have questions about a medical condition or this instruction, always ask your healthcare professional. [address] any warranty or liability for your use of this information.

## 2020-09-08 RX ORDER — CLOPIDOGREL BISULFATE 75 MG/1
TABLET ORAL
Qty: 90 TABLET | Refills: 3 | Status: SHIPPED | OUTPATIENT
Start: 2020-09-08 | End: 2021-12-17

## 2020-10-26 ENCOUNTER — HOSPITAL ENCOUNTER (OUTPATIENT)
Age: 85
Discharge: HOME | End: 2020-10-26
Payer: MEDICARE

## 2020-10-26 ENCOUNTER — OFFICE VISIT (OUTPATIENT)
Dept: CARDIOLOGY | Age: 85
End: 2020-10-26
Payer: MEDICARE

## 2020-10-26 VITALS
HEIGHT: 68 IN | BODY MASS INDEX: 25.37 KG/M2 | WEIGHT: 167.4 LBS | SYSTOLIC BLOOD PRESSURE: 141 MMHG | RESPIRATION RATE: 18 BRPM | OXYGEN SATURATION: 99 % | HEART RATE: 63 BPM | DIASTOLIC BLOOD PRESSURE: 67 MMHG

## 2020-10-26 LAB
ANION GAP SERPL CALCULATED.3IONS-SCNC: 11 MMOL/L (ref 9–17)
BNP INTERPRETATION: ABNORMAL
BUN BLDV-MCNC: 24 MG/DL (ref 8–23)
BUN/CREAT BLD: 19 (ref 9–20)
CALCIUM SERPL-MCNC: 9.6 MG/DL (ref 8.6–10.4)
CHLORIDE BLD-SCNC: 105 MMOL/L (ref 98–107)
CHOLESTEROL/HDL RATIO: 2.7
CHOLESTEROL: 109 MG/DL
CO2: 24 MMOL/L (ref 20–31)
CREAT SERPL-MCNC: 1.27 MG/DL (ref 0.7–1.2)
GFR AFRICAN AMERICAN: >60 ML/MIN
GFR NON-AFRICAN AMERICAN: 54 ML/MIN
GFR SERPL CREATININE-BSD FRML MDRD: ABNORMAL ML/MIN/{1.73_M2}
GFR SERPL CREATININE-BSD FRML MDRD: ABNORMAL ML/MIN/{1.73_M2}
GLUCOSE BLD-MCNC: 94 MG/DL (ref 70–99)
HCT VFR BLD CALC: 44.4 % (ref 40.7–50.3)
HDLC SERPL-MCNC: 40 MG/DL
HEMOGLOBIN: 13.7 G/DL (ref 13–17)
LDL CHOLESTEROL: 45 MG/DL (ref 0–130)
MCH RBC QN AUTO: 27.2 PG (ref 25.2–33.5)
MCHC RBC AUTO-ENTMCNC: 30.9 G/DL (ref 28.4–34.8)
MCV RBC AUTO: 88.1 FL (ref 82.6–102.9)
NRBC AUTOMATED: 0 PER 100 WBC
PDW BLD-RTO: 13.6 % (ref 11.8–14.4)
PLATELET # BLD: 277 K/UL (ref 138–453)
PMV BLD AUTO: 9.9 FL (ref 8.1–13.5)
POTASSIUM SERPL-SCNC: 4.5 MMOL/L (ref 3.7–5.3)
PRO-BNP: 3602 PG/ML
RBC # BLD: 5.04 M/UL (ref 4.21–5.77)
SODIUM BLD-SCNC: 140 MMOL/L (ref 135–144)
TRIGL SERPL-MCNC: 120 MG/DL
VLDLC SERPL CALC-MCNC: ABNORMAL MG/DL (ref 1–30)
WBC # BLD: 8 K/UL (ref 3.5–11.3)

## 2020-10-26 PROCEDURE — 83880 ASSAY OF NATRIURETIC PEPTIDE: CPT

## 2020-10-26 PROCEDURE — G8427 DOCREV CUR MEDS BY ELIG CLIN: HCPCS | Performed by: INTERNAL MEDICINE

## 2020-10-26 PROCEDURE — 99211 OFF/OP EST MAY X REQ PHY/QHP: CPT | Performed by: INTERNAL MEDICINE

## 2020-10-26 PROCEDURE — 85027 COMPLETE CBC AUTOMATED: CPT

## 2020-10-26 PROCEDURE — 36415 COLL VENOUS BLD VENIPUNCTURE: CPT

## 2020-10-26 PROCEDURE — 80061 LIPID PANEL: CPT

## 2020-10-26 PROCEDURE — 1123F ACP DISCUSS/DSCN MKR DOCD: CPT | Performed by: INTERNAL MEDICINE

## 2020-10-26 PROCEDURE — 80048 BASIC METABOLIC PNL TOTAL CA: CPT

## 2020-10-26 PROCEDURE — 99214 OFFICE O/P EST MOD 30 MIN: CPT | Performed by: INTERNAL MEDICINE

## 2020-10-26 PROCEDURE — 4040F PNEUMOC VAC/ADMIN/RCVD: CPT | Performed by: INTERNAL MEDICINE

## 2020-10-26 PROCEDURE — G8417 CALC BMI ABV UP PARAM F/U: HCPCS | Performed by: INTERNAL MEDICINE

## 2020-10-26 PROCEDURE — 1036F TOBACCO NON-USER: CPT | Performed by: INTERNAL MEDICINE

## 2020-10-26 PROCEDURE — G8484 FLU IMMUNIZE NO ADMIN: HCPCS | Performed by: INTERNAL MEDICINE

## 2020-10-26 RX ORDER — METOPROLOL SUCCINATE 25 MG/1
12.5 TABLET, EXTENDED RELEASE ORAL DAILY
Qty: 30 TABLET | Refills: 3 | Status: SHIPPED | OUTPATIENT
Start: 2020-10-26 | End: 2021-11-03 | Stop reason: SDUPTHER

## 2020-10-26 RX ORDER — NITROGLYCERIN 0.4 MG/1
0.4 TABLET SUBLINGUAL EVERY 5 MIN PRN
Qty: 25 TABLET | Refills: 2 | Status: SHIPPED | OUTPATIENT
Start: 2020-10-26 | End: 2021-11-01

## 2020-10-26 RX ORDER — AMLODIPINE BESYLATE 2.5 MG/1
2.5 TABLET ORAL DAILY
Qty: 30 TABLET | Refills: 3 | Status: SHIPPED | OUTPATIENT
Start: 2020-10-26 | End: 2021-06-15 | Stop reason: SDUPTHER

## 2020-10-26 NOTE — PROGRESS NOTES
Colletta Landsberg am scribing for and in the presence of Constance Reyes MD, F.A.C.C..      Patient: Verdie Lefort  : 1935  Date of Visit: 2020    REASON FOR VISIT / CONSULTATION: 3 Month Follow-Up (Hx:Dizziness, ASHD,HTN,HLD. Holter on . He has been doing pretty good. Always SOB/ tired. Some lightheaded/dizziness due to Vertigo. Denies: CP, Palpitations. )      History of Present Illness:        Dear Gurmeet Flores, APRN - CNP    I had the pleasure of seeing Verdie Lefort in my office today. Mr. Yani Limon is a 80 y.o. male who presented for follow-up. He initially presented with a chest tightness. Subsequent cardiac catheterizations showed two-vessel coronary artery disease. Patient status post PCI to LAD and RCA 3/28/2019. He denies any prior history of myocardial infarction or heart failure. No significant heart rhythm problem. He does have a long history of high cholesterol. He is a past smoker and has quit over 40 years ago. His mother had heart problems unsure about fathers history. S/P Heart Cath done on 3/28/019, S/P PCI to mid LAD and mid RCA. ECG 20 showed sinus rhythm with APCs and long first-degree AV block. No acute ischemic changes. Holter monitor worn on 20: No heart rate or rhythm abnormalities that can clearly explain his dizziness. Normal sinus rhythm with rare PAC's and PVC's with 1 PVC triplet. Carotid ultrasound on 2020 showed mild bilateral internal carotid artery stenosis. Bilateral vertebral arteries were patent with antegrade flow. Mr. Yani Limon is here for a 3 month follow up today. He report he had been doing pretty good. He does seem to always have shortness of breath and tiredness and lightheaded/dizziness due to his vertigo but this seems to be improving. Every so often he gets a sharp stabbing pain for just a second in his chest.  He told me that the pain is too short to even pay attention to it.   He denies any chest pain, pressure or tightness. No palpitations. No falls, near falls. No presyncope or syncopal episode. No abdominal pain, bleeding issues, bowel problems, problems with his current medications or any other issues at this time. Exercise Tolerance: Mr. Chloé Gutierrez reports that he has a fairly good exercise tolerance. His says that he works an hour and rests two hours. He has been doing this since last visit. PAST MEDICAL HISTORY:        Past Medical History:   Diagnosis Date    Abnormal stress test     Arrhythmia     ASHD (arteriosclerotic heart disease)     CKD (chronic kidney disease)     Closed traumatic fracture of right femur with routine healing     trauma while in [employer], plate and scews placed and removed    COPD (chronic obstructive pulmonary disease) (HCC)     Elevated PSA     Enlarged prostate     Gallstones     Gout     History of kidney stones     Hydrocele in adult     Hyperlipidemia     Hypertension     Renal stones     Trigger finger     Vertigo     Vertigo     Wrist fracture, left        CURRENT ALLERGIES: Penicillins REVIEW OF SYSTEMS: 14 systems were reviewed. Pertinent positives and negatives as above, all else negative.      Past Surgical History:   Procedure Laterality Date    APPENDECTOMY      20 yo    CARDIAC CATHETERIZATION Left 2019    [facility]    plate and screws s/p trauma  and taken out in [address] [place] [address]  2012    LITHOTRIPSY      PROSTATE BIOPSY  10/22/1998, 2004    TOTAL HIP ARTHROPLASTY Right     Social History:  Social History     Tobacco Use    Smoking status: Former Smoker     Packs/day: 1.00     Years: 25.00     Pack years: 25.00     Last attempt to quit: 1974     Years since quittin.8    Smokeless tobacco: Never Used   Substance Use Topics    Alcohol use: No     Frequency: Never Binge frequency: Never     Comment: quit 38 years ago.  Drug use: No        CURRENT MEDICATIONS:        Outpatient Medications Marked as Taking for the 10/26/20 encounter (Office Visit) with Reagan Mora MD   Medication Sig Dispense Refill    clopidogrel (PLAVIX) 75 MG tablet take 1 tablet by mouth once daily 90 tablet 3    ezetimibe (ZETIA) 10 MG tablet Take 1 tablet by mouth daily 90 tablet 3    nitroGLYCERIN (NITROSTAT) 0.4 MG SL tablet Place 1 tablet under the tongue every 5 minutes as needed for Chest pain 25 tablet 2    amLODIPine (NORVASC) 2.5 MG tablet Take 1 tablet by mouth daily 30 tablet 3    metoprolol succinate (TOPROL XL) 25 MG extended release tablet Take 0.5 tablets by mouth daily 30 tablet 3    zinc gluconate 50 MG tablet Take 50 mg by mouth daily      Omega-3 Fatty Acids (FISH OIL) 1000 MG CAPS Take 3,000 mg by mouth daily       aspirin 81 MG EC tablet Take 81 mg by mouth daily. FAMILY HISTORY: family history includes Arrhythmia in his sister; Heart Attack in his father and mother; Heart Disease in his father and mother; Hypertension in his mother; Kidney Disease in his brother; No Known Problems in his brother; Parkinsonism in his father. Physical Examination:      BP (!) 141/67 (Site: Right Upper Arm, Position: Sitting, Cuff Size: Medium Adult)   Pulse 63   Resp 18   Ht 5' 8\" (1.727 m)   Wt 167 lb 6.4 oz (75.9 kg)   SpO2 99%   BMI 25.45 kg/m²  Body mass index is 25.45 kg/m². Constitutional: He is oriented to person. He appears well-developed and well-nourished. In no acute distress. HEENT: Normocephalic and atraumatic. No JVD present. Carotid bruit is not present. No mass and no thyromegaly present. No lymphadenopathy present. Cardiovascular: Normal rate, regular rhythm, normal heart sounds. Exam reveals no gallop and no friction rubs. No murmur was heard. .  Pulmonary/Chest: Effort normal and breath sounds normal. No respiratory distress.  He has no wheezes, rhonchi or rales. Abdominal: Soft, non-tender. Bowel sounds and aorta are normal. He exhibits no organomegaly, mass or bruit. Extremities: No edema. No cyanosis or clubbing. 2+ radial and carotid pulses. Distal extremity pulses: 2+ bilaterally. Neurological: He is alert and oriented to person. No evidence of gross cranial nerve deficit. Coordination appeared normal.   Skin: Skin is warm and dry. There is no rash or diaphoresis. Psychiatric: He has a normal mood and affect. His speech is normal and behavior is normal.      MOST RECENT LABS ON RECORD:   Lab Results   Component Value Date    WBC 6.8 07/07/2020    HGB 14.2 07/07/2020    HCT 43.0 07/07/2020     07/07/2020    CHOL 209 (H) 07/07/2020    TRIG 172 (H) 07/07/2020    HDL 36 (L) 07/07/2020    ALT 12 03/19/2020    AST 21 03/19/2020     07/07/2020    K 4.9 07/07/2020     07/07/2020    CREATININE 1.70 (H) 07/07/2020    BUN 31 (H) 07/07/2020    CO2 25 07/07/2020    PSA 9.27 (H) 07/18/2012    LABA1C 5.4 01/21/2019     (H) 07/07/2020       ASSESSMENT:     1. Lightheadedness    2. ASHD (arteriosclerotic heart disease)    3. S/P angioplasty with stent    4. Essential hypertension    5. Mixed hyperlipidemia    6. Coronary artery disease involving native coronary artery of native heart without angina pectoris    7. Stage 3b chronic kidney disease       PLAN:        · Lightheadedness/dizziness:   · His blood pressure is very well controlled in the office today. · His stage III chronic kidney disease, I will get a repeat basic metabolic panel for reevaluation. · Counseled regarding adequate hydration. · Beta Blocker: Continue Metoprolol succinate (Toprol XL) 25 mg 1/2 tab daily. · Calcium Channel Blocker: Continue amlodipine (Norvasc) 5 mg 1/2 tab once daily.   · Nonpharmacologic counseling: Because of his condition, I reminded him to try and keep himself well-hydrated and to take extra time when moving from laying to sitting, sitting to standing and standing to walking. I also explained to him to help improve his symptoms he should include  1 or 2 L of sports drinks daily, knee-high compressions stockings. · Atherosclerotic Heart Disease: S/P Stents done on 3/28 by Dr. Marylene Schlatter Antiplatelet Agent: Continue aspirin 81 mg daily and Plavix 75 mg daily.  Beta Blocker: Continue Metoprolol succinate (Toprol XL) 25 mg 1/2 tab daily.  Anti-anginal medications: Continue amlodipine (Norvasc) 5 mg 1/2 tab once daily.  Cholesterol Reduction Therapy: Continue ezetimide (Zetia) 10 mg daily.  Additional Testing List: I took the liberty of ordering a BMP for today to assess their potassium and renal function and I took the liberty of ordering a CBC     Essential Hypertension: Borderline controlled   o Beta Blocker: Continue Metoprolol succinate (Toprol XL) 25 mg 1/2 tab daily. · Calcium Channel Blocker: Continue amlodipine (Norvasc) 5 mg 1/2 tab once daily. · Hyperlipidemia: Mixed - Last LDL done on 139 mg/ dL on 7/7/2020   · Cholesterol Reduction Therapy: Continue ezetimide (Zetia) 10 mg daily. · Laboratory testing: Lipid Panel ordered to be completed      FOLLOW UP:   I told Mr. Brittany Pena to call my office if he had any problems, but otherwise I asked him to Return in about 6 months (around 4/26/2021). However, I would be happy to see him sooner should the need arise. Sincerely,  Geraldine Galvan MD, F.A.C.C. [facility] Cardiology Specialist     [address] [facility]  Phone: [phone], Fax: [phone]     I believe that the risk of significant morbidity and mortality related to the patient's current medical conditions are: intermediate-high. The documentation recorded by the scribe, accurately and completely reflects the services I personally performed and the decisions made by me. Geraldine Galvan MD, F.A.C.C.  October 26, 2020

## 2020-10-27 ENCOUNTER — TELEPHONE (OUTPATIENT)
Dept: CARDIOLOGY | Age: 85
End: 2020-10-27

## 2020-10-27 NOTE — TELEPHONE ENCOUNTER
----- Message from Apple Donahue MD sent at 10/27/2020  7:58 AM EDT -----  Blood work is good. Kidney function is much better than before. Continue current therapy and follow-up.

## 2021-04-08 ENCOUNTER — APPOINTMENT (OUTPATIENT)
Dept: CT IMAGING | Age: 86
End: 2021-04-08
Payer: MEDICARE

## 2021-04-08 ENCOUNTER — HOSPITAL ENCOUNTER (EMERGENCY)
Age: 86
Discharge: HOME | End: 2021-04-09
Attending: EMERGENCY MEDICINE
Payer: MEDICARE

## 2021-04-08 ENCOUNTER — APPOINTMENT (OUTPATIENT)
Dept: GENERAL RADIOLOGY | Age: 86
End: 2021-04-08
Payer: MEDICARE

## 2021-04-08 DIAGNOSIS — R07.9 CHEST PAIN, UNSPECIFIED TYPE: ICD-10-CM

## 2021-04-08 DIAGNOSIS — G45.9 TIA (TRANSIENT ISCHEMIC ATTACK): Primary | ICD-10-CM

## 2021-04-08 PROBLEM — R53.1 LEFT-SIDED WEAKNESS: Status: ACTIVE | Noted: 2021-04-08

## 2021-04-08 LAB
ABSOLUTE EOS #: 0.25 K/UL (ref 0–0.44)
ABSOLUTE IMMATURE GRANULOCYTE: 0.04 K/UL (ref 0–0.3)
ABSOLUTE LYMPH #: 2.1 K/UL (ref 1.1–3.7)
ABSOLUTE MONO #: 0.57 K/UL (ref 0.1–1.2)
ALBUMIN SERPL-MCNC: 4 G/DL (ref 3.5–5.2)
ALBUMIN/GLOBULIN RATIO: 1.4 (ref 1–2.5)
ALP BLD-CCNC: 89 U/L (ref 40–129)
ALT SERPL-CCNC: 9 U/L (ref 5–41)
ANION GAP SERPL CALCULATED.3IONS-SCNC: 9 MMOL/L (ref 9–17)
AST SERPL-CCNC: 19 U/L
BASOPHILS # BLD: 1 % (ref 0–2)
BASOPHILS ABSOLUTE: 0.06 K/UL (ref 0–0.2)
BILIRUB SERPL-MCNC: 0.53 MG/DL (ref 0.3–1.2)
BNP INTERPRETATION: ABNORMAL
BUN BLDV-MCNC: 24 MG/DL (ref 8–23)
BUN/CREAT BLD: 15 (ref 9–20)
CALCIUM SERPL-MCNC: 9.8 MG/DL (ref 8.6–10.4)
CHLORIDE BLD-SCNC: 106 MMOL/L (ref 98–107)
CHP ED QC CHECK: YES
CO2: 23 MMOL/L (ref 20–31)
CREAT SERPL-MCNC: 1.59 MG/DL (ref 0.7–1.2)
DIFFERENTIAL TYPE: ABNORMAL
EKG ATRIAL RATE: 83 BPM
EKG P AXIS: 48 DEGREES
EKG P-R INTERVAL: 270 MS
EKG Q-T INTERVAL: 354 MS
EKG QRS DURATION: 86 MS
EKG QTC CALCULATION (BAZETT): 415 MS
EKG R AXIS: -24 DEGREES
EKG T AXIS: 72 DEGREES
EKG VENTRICULAR RATE: 83 BPM
EOSINOPHILS RELATIVE PERCENT: 3 % (ref 1–4)
GFR AFRICAN AMERICAN: 50 ML/MIN
GFR NON-AFRICAN AMERICAN: 42 ML/MIN
GFR SERPL CREATININE-BSD FRML MDRD: ABNORMAL ML/MIN/{1.73_M2}
GFR SERPL CREATININE-BSD FRML MDRD: ABNORMAL ML/MIN/{1.73_M2}
GLUCOSE BLD-MCNC: 86 MG/DL
GLUCOSE BLD-MCNC: 86 MG/DL (ref 74–100)
GLUCOSE BLD-MCNC: 91 MG/DL (ref 70–99)
HCT VFR BLD CALC: 43.6 % (ref 40.7–50.3)
HEMOGLOBIN: 13.8 G/DL (ref 13–17)
IMMATURE GRANULOCYTES: 1 %
INR BLD: 1.1
LYMPHOCYTES # BLD: 28 % (ref 24–43)
MCH RBC QN AUTO: 27 PG (ref 25.2–33.5)
MCHC RBC AUTO-ENTMCNC: 31.7 G/DL (ref 28.4–34.8)
MCV RBC AUTO: 85.3 FL (ref 82.6–102.9)
MONOCYTES # BLD: 8 % (ref 3–12)
NRBC AUTOMATED: 0 PER 100 WBC
PARTIAL THROMBOPLASTIN TIME: 31.3 SEC (ref 23.9–33.8)
PDW BLD-RTO: 13.3 % (ref 11.8–14.4)
PLATELET # BLD: 252 K/UL (ref 138–453)
PLATELET ESTIMATE: ABNORMAL
PMV BLD AUTO: 9.7 FL (ref 8.1–13.5)
POTASSIUM SERPL-SCNC: 3.9 MMOL/L (ref 3.7–5.3)
PRO-BNP: 3124 PG/ML
PROTHROMBIN TIME: 13.8 SEC (ref 11.5–14.2)
RBC # BLD: 5.11 M/UL (ref 4.21–5.77)
RBC # BLD: ABNORMAL 10*6/UL
SEG NEUTROPHILS: 59 % (ref 36–65)
SEGMENTED NEUTROPHILS ABSOLUTE COUNT: 4.37 K/UL (ref 1.5–8.1)
SODIUM BLD-SCNC: 138 MMOL/L (ref 135–144)
TOTAL PROTEIN: 6.9 G/DL (ref 6.4–8.3)
TROPONIN INTERP: ABNORMAL
TROPONIN T: ABNORMAL NG/ML
TROPONIN, HIGH SENSITIVITY: 40 NG/L (ref 0–22)
TROPONIN, HIGH SENSITIVITY: 43 NG/L (ref 0–22)
TROPONIN, HIGH SENSITIVITY: 47 NG/L (ref 0–22)
WBC # BLD: 7.4 K/UL (ref 3.5–11.3)
WBC # BLD: ABNORMAL 10*3/UL

## 2021-04-08 PROCEDURE — 2580000003 HC RX 258: Performed by: EMERGENCY MEDICINE

## 2021-04-08 PROCEDURE — 70450 CT HEAD/BRAIN W/O DYE: CPT

## 2021-04-08 PROCEDURE — 85025 COMPLETE CBC W/AUTO DIFF WBC: CPT

## 2021-04-08 PROCEDURE — 71045 X-RAY EXAM CHEST 1 VIEW: CPT

## 2021-04-08 PROCEDURE — 83880 ASSAY OF NATRIURETIC PEPTIDE: CPT

## 2021-04-08 PROCEDURE — 93005 ELECTROCARDIOGRAM TRACING: CPT | Performed by: EMERGENCY MEDICINE

## 2021-04-08 PROCEDURE — 99285 EMERGENCY DEPT VISIT HI MDM: CPT

## 2021-04-08 PROCEDURE — 82947 ASSAY GLUCOSE BLOOD QUANT: CPT

## 2021-04-08 PROCEDURE — 6360000004 HC RX CONTRAST MEDICATION: Performed by: EMERGENCY MEDICINE

## 2021-04-08 PROCEDURE — 80053 COMPREHEN METABOLIC PANEL: CPT

## 2021-04-08 PROCEDURE — 6370000000 HC RX 637 (ALT 250 FOR IP): Performed by: EMERGENCY MEDICINE

## 2021-04-08 PROCEDURE — 85730 THROMBOPLASTIN TIME PARTIAL: CPT

## 2021-04-08 PROCEDURE — 36415 COLL VENOUS BLD VENIPUNCTURE: CPT

## 2021-04-08 PROCEDURE — 70498 CT ANGIOGRAPHY NECK: CPT

## 2021-04-08 PROCEDURE — 85610 PROTHROMBIN TIME: CPT

## 2021-04-08 PROCEDURE — 84484 ASSAY OF TROPONIN QUANT: CPT

## 2021-04-08 RX ORDER — ASPIRIN 81 MG/1
324 TABLET, CHEWABLE ORAL ONCE
Status: COMPLETED | OUTPATIENT
Start: 2021-04-08 | End: 2021-04-08

## 2021-04-08 RX ORDER — 0.9 % SODIUM CHLORIDE 0.9 %
1000 INTRAVENOUS SOLUTION INTRAVENOUS ONCE
Status: COMPLETED | OUTPATIENT
Start: 2021-04-08 | End: 2021-04-08

## 2021-04-08 RX ORDER — SODIUM CHLORIDE 9 MG/ML
1000 INJECTION, SOLUTION INTRAVENOUS CONTINUOUS
Status: DISCONTINUED | OUTPATIENT
Start: 2021-04-08 | End: 2021-04-09 | Stop reason: HOSPADM

## 2021-04-08 RX ADMIN — SODIUM CHLORIDE 1000 ML: 9 INJECTION, SOLUTION INTRAVENOUS at 17:15

## 2021-04-08 RX ADMIN — IOPAMIDOL 75 ML: 755 INJECTION, SOLUTION INTRAVENOUS at 09:08

## 2021-04-08 RX ADMIN — ASPIRIN 81 MG CHEWABLE TABLET 324 MG: 81 TABLET CHEWABLE at 09:25

## 2021-04-08 RX ADMIN — SODIUM CHLORIDE 1000 ML: 9 INJECTION, SOLUTION INTRAVENOUS at 09:27

## 2021-04-08 ASSESSMENT — ENCOUNTER SYMPTOMS
SHORTNESS OF BREATH: 0
BLOOD IN STOOL: 0
CHEST TIGHTNESS: 0
BACK PAIN: 0
ABDOMINAL PAIN: 0
VOMITING: 0
TROUBLE SWALLOWING: 0
DIARRHEA: 0
COUGH: 0
VOICE CHANGE: 0
NAUSEA: 0

## 2021-04-08 ASSESSMENT — PAIN DESCRIPTION - PAIN TYPE
TYPE: ACUTE PAIN
TYPE: ACUTE PAIN

## 2021-04-08 ASSESSMENT — PAIN DESCRIPTION - LOCATION: LOCATION: CHEST

## 2021-04-08 NOTE — ED NOTES
Dr. Catalina Duque made aware that pt is not complaining of chest pain in all areas of chest  Rating 3/10, continuous pressure.   Ekg obtained and given to Dr. Masha Luo, RN  04/08/21 1232

## 2021-04-08 NOTE — ED NOTES
Pt provided with phone to call his wife to update her on transfer     Jarod Clarke RN  04/08/21 3611

## 2021-04-08 NOTE — ED NOTES
[facility] called with [facility] on line to speak with Dr. Juana Osborn.      Arian Post A Reser  04/08/21 [id number]

## 2021-04-08 NOTE — ED NOTES
[facility] to request they try . [facility]'s for bed availability.      Stephania ANAND Reser  04/08/21 [id number]

## 2021-04-08 NOTE — ED NOTES
[address] Pulliam for stroke doc at [facility]. V's per Dr. Perez Fletcher request.  Currently speaking with Dr. Perez Fletcher at this time.      Keith ANAND Reser  04/08/21 9721

## 2021-04-08 NOTE — ED PROVIDER NOTES
CYSTOURETHROSCOPY      FEMUR SURGERY      plate and screws s/p trauma  and taken out in [address] [facility] [address]  2012    LITHOTRIPSY      PROSTATE BIOPSY  10/22/1998, 2004    TOTAL HIP ARTHROPLASTY Right 1999       CURRENT MEDICATIONS       Previous Medications    AMLODIPINE (NORVASC) 2.5 MG TABLET    Take 1 tablet by mouth daily    ASPIRIN 81 MG EC TABLET    Take 81 mg by mouth daily.       CLOPIDOGREL (PLAVIX) 75 MG TABLET    take 1 tablet by mouth once daily    EZETIMIBE (ZETIA) 10 MG TABLET    Take 1 tablet by mouth daily    METOPROLOL SUCCINATE (TOPROL XL) 25 MG EXTENDED RELEASE TABLET    Take 0.5 tablets by mouth daily    NITROGLYCERIN (NITROSTAT) 0.4 MG SL TABLET    Place 1 tablet under the tongue every 5 minutes as needed for Chest pain    OMEGA-3 FATTY ACIDS (FISH OIL) 1000 MG CAPS    Take 3,000 mg by mouth daily     ZINC GLUCONATE 50 MG TABLET    Take 50 mg by mouth daily       ALLERGIES     Penicillins    FAMILY HISTORY       Family History   Problem Relation Age of Onset    Heart Disease Mother     Hypertension Mother     Heart Attack Mother     Parkinsonism Father     Heart Attack Father     Heart Disease Father     Kidney Disease Brother         dialysis    Arrhythmia Sister     No Known Problems Brother         SOCIAL HISTORY       Social History     Socioeconomic History    Marital status:      Spouse name: None    Number of children: None    Years of education: None    Highest education level: None   Occupational History    None   Social Needs    Financial resource strain: Not hard at all   Widener-Estelle insecurity     Worry: Sometimes true     Inability: Never true    Transportation needs     Medical: No     Non-medical: No   Tobacco Use    Smoking status: Former Smoker     Packs/day: 1.00     Years: 25.00     Pack years: 25.00     Quit date: 1974     Years since quittin.3    Smokeless tobacco: Never Used   Substance and Sexual Activity    Alcohol use: No     Frequency: Never     Binge frequency: Never     Comment: quit 38 years ago.  Drug use: No    Sexual activity: Yes     Partners: Female   Lifestyle    Physical activity     Days per week: 3 days     Minutes per session: 30 min    Stress: Only a little   Relationships    Social connections     Talks on phone: Once a week     Gets together: Once a week     Attends Rastafari service: More than 4 times per year     Active member of club or organization: Yes     Attends meetings of clubs or organizations: More than 4 times per year     Relationship status:     Intimate partner violence     Fear of current or ex partner: No     Emotionally abused: No     Physically abused: No     Forced sexual activity: No   Other Topics Concern    None   Social History Narrative    None       REVIEW OF SYSTEMS     Review of Systems   Constitutional: Negative for chills, diaphoresis and fever. HENT: Negative for trouble swallowing and voice change. Eyes: Negative for visual disturbance. Respiratory: Negative for cough, chest tightness and shortness of breath. Cardiovascular: Negative for chest pain and leg swelling. Gastrointestinal: Negative for abdominal pain, blood in stool, diarrhea, nausea and vomiting. Genitourinary: Negative for dysuria, frequency and hematuria. Musculoskeletal: Negative for back pain and neck pain. Skin: Negative for rash and wound. Neurological: Positive for weakness and numbness. Negative for speech difficulty and headaches. Psychiatric/Behavioral: Negative for confusion. Except as noted above the remainder of the review of systems was reviewed and is. PHYSICAL EXAM    (up to 7 for level 4, 8 or more for level 5)     ED Triage Vitals [04/08/21 0820]   BP Temp Temp Source Pulse Resp SpO2 Height Weight   (!) 157/86 96.5 °F (35.8 °C) Tympanic 79 14 100 % 5' 8\" (1.727 m) 170 lb (77.1 kg)       Physical Exam  Vitals signs and nursing note reviewed. Constitutional:       Appearance: He is well-developed. HENT:      Head: Normocephalic. Eyes:      Conjunctiva/sclera: Conjunctivae normal.      Pupils: Pupils are equal, round, and reactive to light. Neck:      Musculoskeletal: Neck supple. Trachea: No tracheal deviation. Pulmonary:      Effort: Pulmonary effort is normal.   Musculoskeletal: Normal range of motion. Skin:     General: Skin is warm and dry. Neurological:      Mental Status: He is alert and oriented to person, place, and time. Cranial Nerves: No cranial nerve deficit. Comments: Neurologic Exam      Mental Status   Oriented to person, place, and time. Attention: normal.   Speech: speech is normal   Level of consciousness: alert  Normal comprehension.      Cranial Nerves      CN II: Visual fields full to confrontation.      CN III, IV, VI PERRL, EOMI     CN V Facial sensation intact.      CN VII Facial expression full, symmetric.      CN VIII normal.      CN IX normal.      CN XI normal.      CN XII normal.      Motor Exam   Muscle bulk: normal  Overall muscle tone: normal  Arm pronator drift: absent       Strength   5/5 bilat. Some shaking on LLE against gravity but intact strength otherwise     Sensory Exam   Light touch normal.     Pinprick normal.      Gait, Coordination, and Reflexes      Gait: normal     Coordination Finger to nose: normal     Tremor   Resting tremor: absent     Reflexes   2+ bilat           DIAGNOSTIC RESULTS     EKG:(none if blank)  All EKG's are [facility] who either signs or Co-signs this chart in the absence of a cardiologist.    EKG shows normal sinus rhythm, normal VT, QRS, QTc intervals. No ectopy no acute ischemic changes.     RADIOLOGY: (none if blank)   Interpretation per the Radiologistbelow, if available at the time of this note:    XR CHEST PORTABLE   Final Result   Findings suggesting a degree of fluid overload/edema/congestive heart failure without definite pleural effusions. CTA HEAD NECK W CONTRAST   Final Result   No evidence for large vessel occlusion. Mild stenosis in the M1 portion of the left middle cerebral artery. Foci of mild stenosis in the V4 portion of the right vertebral artery. Focus of moderate stenosis in the V4 portion of the left vertebral artery. CT HEAD WO CONTRAST   Final Result   Chronic microvascular disease without acute intracranial abnormality. Findings were discussed with Herlinda Ward at 9:09 am on 4/8/2021. LABS:  Labs Reviewed   COMPREHENSIVE METABOLIC PANEL W/ REFLEX TO MG FOR LOW K - Abnormal; Notable for the following components:       Result Value    BUN 24 (*)     CREATININE 1.59 (*)     GFR Non- 42 (*)     GFR  50 (*)     All other components within normal limits   TROPONIN - Abnormal; Notable for the following components:    Troponin, High Sensitivity 43 (*)     All other components within normal limits   CBC WITH AUTO DIFFERENTIAL - Abnormal; Notable for the following components:    Immature Granulocytes 1 (*)     All other components within normal limits   TROPONIN - Abnormal; Notable for the following components:    Troponin, High Sensitivity 40 (*)     All other components within normal limits   BRAIN NATRIURETIC PEPTIDE - Abnormal; Notable for the following components:    Pro-BNP 3,124 (*)     All other components within normal limits   POCT GLUCOSE - Normal   PROTIME-INR   APTT   GLUCOSE, WHOLE BLOOD       All other labs were within normal range or not returned as of this dictation. Please note, any cultures that may have been sent were not resulted at the time of this patient visit.     EMERGENCY DEPARTMENT COURSE andMedical Decision Making:     MDM/  ED Course as of Apr 09 0916   Thu Apr 08, 2021   0848 Resting comfortably; Currently NIH 0. Being transported to CT now    [AB]   0924 Discussed w/ dr Stanton Guillermo stroke neuro who advises liter of fluids 325mg ASA and transfer to [facility] ED. Pt is not a thrombolytic candidate    [AB]   0926 Dr Gilson Rendon accepting    [AB]   [phone] Patient called the nurses he developed some chest discomfort. On my examination, it is nonradiating, it has improved significantly, he did not have any diaphoresis or shortness of breath associated with it. It was nonradiating in nature. EKG shows a sinus rhythm with no ischemic changes on repeat at 0 824. Patient states this chest discomfort is now a 2 out of 10, minimal, does not want any medications for it at this time    [AB]   1020 Will obtain a repeat troponin  CXR results noted--> ?fluid overload although clinically pt is not edematous. Will slow down the fluids    [AB]   1056 Reassessed; patinet is Chest pain free  States he has a chronic cough. SOB has not been any worse than normal    [AB]   1215 Reassesed; patient is pain free    [AB]   8273 Dr Javon Saab called back to ask about the chest discomfort. Looks well now    [AB]   [phone] Dr. Lo [facility] hospitalist accepts pt to his service    [AB]   [phone] asymptomatic    [AB]   [phone] Reassessed; stable though remains hypertensive  [facility] is discharge dependent; will attempt [facility]  Pt and wife agreeable    [AB]      ED Course User Index  [AB] Supriya Alfaro,          The patient was evaluated during the COVID-19 pandemic. The patient was screened today during their presentation for commonly recognized symptoms and signs of COVID-19 infection. Their evaluation, treatment and testing was consistent with current guidelines for patients who present with complaints or symptoms that may be related to COVID-19.       ED Medications administered this visit:    Medications   iopamidol (ISOVUE-370) 76 % injection 75 mL (75 mLs Intravenous Given 4/8/21 0908)   aspirin chewable tablet 324 mg (324 mg Oral Given 4/8/21 0925)   0.9 % sodium chloride bolus (0 mLs Intravenous Stopped 4/8/21 1034)         Procedures: (None if blank)       CLINICAL       1. TIA (transient ischemic attack)    2. Chest pain, unspecified type          DISPOSITION/PLAN   DISPOSITION Decision To Transfer 04/08/2021 09:24:42 AM      PATIENT REFERRED TO:  No follow-up provider specified.     DISCHARGE MEDICATIONS:  New Prescriptions    No medications on file              (Please note that portions of this note were completed with a voice recognition program.  Efforts were made to edit the dictations but occasionallywords are mis-transcribed.)      Anel Stephenson DO,CHARMAINE (electronically signed)  Attending Physician, Emergency [address], DO  04/08/21 [address] DO  04/09/21 6327

## 2021-04-08 NOTE — ED NOTES
Per Dr. Kelly Jean pt does not need to be accompanied by nurse to radiology, this writer accompanied pt to radiology     Federico Toney RN  04/08/21 RISHABH Stewart  04/08/21 7814

## 2021-04-08 NOTE — ED NOTES
[address] Pulliam per Dr. Antoine Russell request.  Asked that they inform Dr. Gilford Homes that pt is having some chest pain. He is stable and has no ischemia but wanted him informed.      [place] A Reser  04/08/21 8608

## 2021-04-09 ENCOUNTER — APPOINTMENT (OUTPATIENT)
Dept: MRI IMAGING | Age: 86
End: 2021-04-09
Payer: MEDICARE

## 2021-04-09 VITALS
BODY MASS INDEX: 25.76 KG/M2 | WEIGHT: 170 LBS | OXYGEN SATURATION: 99 % | RESPIRATION RATE: 19 BRPM | HEIGHT: 68 IN | DIASTOLIC BLOOD PRESSURE: 67 MMHG | HEART RATE: 83 BPM | SYSTOLIC BLOOD PRESSURE: 178 MMHG | TEMPERATURE: 96.5 F

## 2021-04-09 LAB
EKG ATRIAL RATE: 72 BPM
EKG ATRIAL RATE: 72 BPM
EKG ATRIAL RATE: 83 BPM
EKG P AXIS: 48 DEGREES
EKG P AXIS: 66 DEGREES
EKG P-R INTERVAL: 192 MS
EKG P-R INTERVAL: 200 MS
EKG P-R INTERVAL: 270 MS
EKG Q-T INTERVAL: 354 MS
EKG Q-T INTERVAL: 366 MS
EKG Q-T INTERVAL: 366 MS
EKG QRS DURATION: 76 MS
EKG QRS DURATION: 86 MS
EKG QRS DURATION: 86 MS
EKG QTC CALCULATION (BAZETT): 400 MS
EKG QTC CALCULATION (BAZETT): 400 MS
EKG QTC CALCULATION (BAZETT): 415 MS
EKG R AXIS: -23 DEGREES
EKG R AXIS: -23 DEGREES
EKG R AXIS: -24 DEGREES
EKG T AXIS: 39 DEGREES
EKG T AXIS: 65 DEGREES
EKG T AXIS: 72 DEGREES
EKG VENTRICULAR RATE: 72 BPM
EKG VENTRICULAR RATE: 72 BPM
EKG VENTRICULAR RATE: 83 BPM

## 2021-04-09 PROCEDURE — 70551 MRI BRAIN STEM W/O DYE: CPT

## 2021-04-09 PROCEDURE — 93010 ELECTROCARDIOGRAM REPORT: CPT | Performed by: INTERNAL MEDICINE

## 2021-04-09 ASSESSMENT — ENCOUNTER SYMPTOMS
DIARRHEA: 0
TROUBLE SWALLOWING: 0
BACK PAIN: 0
COUGH: 0
CONSTIPATION: 0
VOMITING: 0
ABDOMINAL DISTENTION: 0
ABDOMINAL PAIN: 0
NAUSEA: 0

## 2021-04-09 NOTE — ED PROVIDER NOTES
[facility] ED  EMERGENCY DEPARTMENT ENCOUNTER      Pt Name:Cameron Wren  MRN: [id number]  Birthdate 1935  Date of evaluation: 4/8/2021  Provider: Navneet Jimenez MD    [address] [place] [address]     Chief Complaint   Patient presents with    Extremity Weakness     Left side, onset upon waking this AM at 0600. HISTORY OF PRESENT ILLNESS   (Location/Symptom, Timing/Onset, Context/Setting, Quality, Duration, Modifying Factors, Severity)  Note limiting factors. HPI the patient is turned over to me at change of shifts. He came in with numbness and left-sided weakness. It has resolved. Patient was to be [facility]'s to see Dr. Kevan Worthington. However, patient has had a very long wait and after conversation with his daughter, he wishes to be discharged. He states he will follow up with the neurologist as an outpatient. Nursing Notes were reviewed. REVIEW OF SYSTEMS    (2-9 systems for level 4, 10 or more for level 5)     Review of Systems   Constitutional: Negative for activity change. HENT: Negative for congestion and trouble swallowing. Eyes: Negative for visual disturbance. Respiratory: Negative for cough. Cardiovascular: Negative for chest pain. Gastrointestinal: Negative for abdominal distention, abdominal pain, constipation, diarrhea, nausea and vomiting. Genitourinary: Negative for difficulty urinating. Musculoskeletal: Negative for back pain, gait problem, neck pain and neck stiffness. Skin: Negative. Neurological: Negative for dizziness, seizures, speech difficulty and headaches. Psychiatric/Behavioral: Negative for confusion, decreased concentration and dysphoric mood. My review of systems is after his symptoms resolved.          MEDICAL HISTORY     Past Medical History:   Diagnosis Date    Abnormal stress test 2017    Arrhythmia     ASHD (arteriosclerotic heart disease)     CKD (chronic kidney disease)     Closed traumatic fracture of right femur with routine healing 1955    trauma while in [employer], plate and scews placed and removed    COPD (chronic obstructive pulmonary disease) (HCC)     Elevated PSA     Enlarged prostate     Gallstones     Gout     History of kidney stones     Hydrocele in adult     Hyperlipidemia     Hypertension     Renal stones     Trigger finger     Vertigo     Vertigo     Wrist fracture, left          SURGICAL HISTORY       Past Surgical History:   Procedure Laterality Date    APPENDECTOMY      20 yo    CARDIAC CATHETERIZATION Left 03/28/2019    [facility]    plate and screws s/p trauma  and taken out in [address] [place] [address]  9/12/2012    LITHOTRIPSY      PROSTATE BIOPSY  10/22/1998, 8/26/2004    TOTAL HIP ARTHROPLASTY Right 1999         CURRENT MEDICATIONS       Previous Medications    AMLODIPINE (NORVASC) 2.5 MG TABLET    Take 1 tablet by mouth daily    ASPIRIN 81 MG EC TABLET    Take 81 mg by mouth daily.       CLOPIDOGREL (PLAVIX) 75 MG TABLET    take 1 tablet by mouth once daily    EZETIMIBE (ZETIA) 10 MG TABLET    Take 1 tablet by mouth daily    METOPROLOL SUCCINATE (TOPROL XL) 25 MG EXTENDED RELEASE TABLET    Take 0.5 tablets by mouth daily    NITROGLYCERIN (NITROSTAT) 0.4 MG SL TABLET    Place 1 tablet under the tongue every 5 minutes as needed for Chest pain    OMEGA-3 FATTY ACIDS (FISH OIL) 1000 MG CAPS    Take 3,000 mg by mouth daily     ZINC GLUCONATE 50 MG TABLET    Take 50 mg by mouth daily       ALLERGIES     Penicillins    FAMILY HISTORY       Family History   Problem Relation Age of Onset    Heart Disease Mother     Hypertension Mother     Heart Attack Mother     Parkinsonism Father     Heart Attack Father     Heart Disease Father     Kidney Disease Brother         dialysis    Arrhythmia Sister     No Known Problems Brother           SOCIAL Ataxia (7. ): Absent  Sensory (8. ): Normal  Best Language (9. ): No aphasia  Dysarthria (10. ): Normal  Extinction and Inattention (11): No abnormality  Total: 0Glasgow Coma Scale  Eye Opening: Spontaneous  Best Verbal Response: Oriented  Best Motor Response: Obeys commands  Carlie Coma Scale Score: 15        PHYSICAL EXAM  (up to 7 for level 4, 8 or more for level 5)     ED Triage Vitals [04/08/21 0820]   BP Temp Temp Source Pulse Resp SpO2 Height Weight   (!) 157/86 96.5 °F (35.8 °C) Tympanic 79 14 100 % 5' 8\" (1.727 m) 170 lb (77.1 kg)       Physical Exam  Constitutional:       Appearance: Normal appearance. He is normal weight. HENT:      Head: Normocephalic and atraumatic. Mouth/Throat:      Mouth: Mucous membranes are moist.      Pharynx: Oropharynx is clear. Comments: Soft palate elevates symmetrically tongue is midline. Eyes:      Extraocular Movements: Extraocular movements intact. Conjunctiva/sclera: Conjunctivae normal.      Pupils: Pupils are equal, round, and reactive to light. Neck:      Musculoskeletal: Normal range of motion and neck supple. Cardiovascular:      Rate and Rhythm: Normal rate and regular rhythm. Pulses: Normal pulses. Heart sounds: Normal heart sounds. Pulmonary:      Effort: Pulmonary effort is normal.      Breath sounds: Normal breath sounds. Abdominal:      General: Abdomen is flat. Bowel sounds are normal. There is no distension. Palpations: Abdomen is soft. Tenderness: There is no abdominal tenderness. There is no rebound. Musculoskeletal: Normal range of motion. Skin:     General: Skin is warm and dry. Neurological:      General: No focal deficit present. Mental Status: He is alert and oriented to person, place, and time. Mental status is at baseline. Cranial Nerves: No cranial nerve deficit. Sensory: No sensory deficit. Motor: No weakness.       Coordination: Coordination normal.      Gait: Gait normal. Psychiatric:         Mood and Affect: Mood normal.         Behavior: Behavior normal.         Thought Content: Thought content normal.         Judgment: Judgment normal.         DIAGNOSTIC RESULTS     EKG: All EKG's are interpreted by the Emergency Department Physician whoeither signs or Co-signs this chart in the absence of a cardiologist.      RADIOLOGY:   Non-plain film images such as CT, Ultrasound and MRI are read by the radiologist. Plain radiographic images are visualized and preliminarily interpreted by the emergency physician     Interpretation per the Radiologist below, if available at the time of this note:    XR CHEST PORTABLE   Final Result   Findings suggesting a degree of fluid overload/edema/congestive heart failure   without definite pleural effusions. CTA HEAD NECK W CONTRAST   Final Result   No evidence for large vessel occlusion. Mild stenosis in the M1 portion of the left middle cerebral artery. Foci of mild stenosis in the V4 portion of the right vertebral artery. Focus of moderate stenosis in the V4 portion of the left vertebral artery. CT HEAD WO CONTRAST   Final Result   Chronic microvascular disease without acute intracranial abnormality. Findings were discussed with Indiana Vera at 9:09 am on 4/8/2021.          MRI BRAIN WO CONTRAST    (Results Pending)         ED BEDSIDE ULTRASOUND:   Performed by ED Physician - none    LABS:  Labs Reviewed   COMPREHENSIVE METABOLIC PANEL W/ REFLEX TO MG FOR LOW K - Abnormal; Notable for the following components:       Result Value    BUN 24 (*)     CREATININE 1.59 (*)     GFR Non- 42 (*)     GFR  50 (*)     All other components within normal limits   TROPONIN - Abnormal; Notable for the following components:    Troponin, High Sensitivity 43 (*)     All other components within normal limits   CBC WITH AUTO DIFFERENTIAL - Abnormal; Notable for the following components:    Immature Granulocytes 1 (*)     All other components within normal limits   TROPONIN - Abnormal; Notable for the following components:    Troponin, High Sensitivity 40 (*)     All other components within normal limits   BRAIN NATRIURETIC PEPTIDE - Abnormal; Notable for the following components:    Pro-BNP 3,124 (*)     All other components within normal limits   TROPONIN - Abnormal; Notable for the following components:    Troponin, High Sensitivity 47 (*)     All other components within normal limits   POCT GLUCOSE - Normal   PROTIME-INR   APTT   GLUCOSE, WHOLE BLOOD       EMERGENCY DEPARTMENT COURSE and DIFFERENTIAL DIAGNOSIS/MDM:   Vitals:    Vitals:    04/09/21 0650 04/09/21 0654 04/09/21 0700 04/09/21 0715   BP: (!) 210/95 (!) 210/84 (!) 211/104 (!) 199/80   Pulse: 79 78 82 85   Resp: 24 21 19    Temp:       TempSrc:       SpO2: 98% 97% 98% 98%   Weight:       Height:               MDM patient per his decision will be discharged. Follow-up as an outpatient with a neurologist.  He understands the risk. He understands that he could have a stroke. He is willing to take the risk. CONSULTS:  None    PROCEDURES:  Unless otherwise noted below, none     Procedures    FINAL IMPRESSION      1. TIA (transient ischemic attack)    2. Chest pain, unspecified type          DISPOSITION/PLAN   DISPOSITION Decision To Transfer 04/08/2021 09:24:42 AM      PATIENT REFERRED TO:  No follow-up provider specified.     DISCHARGE MEDICATIONS:  New Prescriptions    No medications on file              (Please note that portions ofthis note were completed with a voice recognition program.  Efforts were made to edit the dictations but occasionally words are mis-transcribed.)      Uday Jackson MD (electronically signed)  Attending Emergency Physician          Korina Church MD  04/09/21 0812       Korina Church MD  04/09/21 6936

## 2021-04-09 NOTE — ED NOTES
Per Lois Vallecillo at [employer]. Shift change for dispatch is at 0600. The day shift dispatcher will call with an ETA once they come in and get report.       Davina Armas  04/09/21 1899

## 2021-04-09 NOTE — ED NOTES
Spoke with patient about transfer to [address] [place] [address] of ambulance approx. 20 mins. Patient has decided to not go to [facility]. Pt will have tests done as needed here as ordered by Dr. Den Rojas,  then will take referral  for neurologist  at [facility]. Dr. Luz Holguin . Dr. Den Rojas notified of patients decision.      Suleiman Edouard, RN  04/09/21 4833

## 2021-04-09 NOTE — ED NOTES
Rose Mcconnell called with ETA of 1847. However, Dr. Ebony Allen had just talked with patient and stated he wishes to cancel transport and follow up on his own. Contacted [email or web address] and informed LS pt is going to go home.      Noelle ANAND Reser  04/09/21 3518

## 2021-04-09 NOTE — ED NOTES
Patient complains of urination urgency and frequency. Dr Surekha Saleh is aware.       Rayne Johnson RN  04/08/21 211

## 2021-04-30 ENCOUNTER — OFFICE VISIT (OUTPATIENT)
Dept: CARDIOLOGY | Age: 86
End: 2021-04-30
Payer: MEDICARE

## 2021-04-30 VITALS
WEIGHT: 172.6 LBS | BODY MASS INDEX: 26.16 KG/M2 | SYSTOLIC BLOOD PRESSURE: 122 MMHG | OXYGEN SATURATION: 93 % | RESPIRATION RATE: 18 BRPM | HEIGHT: 68 IN | HEART RATE: 77 BPM | DIASTOLIC BLOOD PRESSURE: 64 MMHG

## 2021-04-30 DIAGNOSIS — E78.2 MIXED HYPERLIPIDEMIA: ICD-10-CM

## 2021-04-30 DIAGNOSIS — R42 LIGHTHEADEDNESS: Primary | ICD-10-CM

## 2021-04-30 DIAGNOSIS — G45.9 TIA (TRANSIENT ISCHEMIC ATTACK): ICD-10-CM

## 2021-04-30 DIAGNOSIS — R53.83 LACK OF ENERGY: ICD-10-CM

## 2021-04-30 DIAGNOSIS — I25.10 ASHD (ARTERIOSCLEROTIC HEART DISEASE): ICD-10-CM

## 2021-04-30 DIAGNOSIS — Z95.820 S/P ANGIOPLASTY WITH STENT: ICD-10-CM

## 2021-04-30 DIAGNOSIS — I10 ESSENTIAL HYPERTENSION: ICD-10-CM

## 2021-04-30 PROCEDURE — 1036F TOBACCO NON-USER: CPT | Performed by: INTERNAL MEDICINE

## 2021-04-30 PROCEDURE — 4040F PNEUMOC VAC/ADMIN/RCVD: CPT | Performed by: INTERNAL MEDICINE

## 2021-04-30 PROCEDURE — G8417 CALC BMI ABV UP PARAM F/U: HCPCS | Performed by: INTERNAL MEDICINE

## 2021-04-30 PROCEDURE — 99211 OFF/OP EST MAY X REQ PHY/QHP: CPT

## 2021-04-30 PROCEDURE — G8427 DOCREV CUR MEDS BY ELIG CLIN: HCPCS | Performed by: INTERNAL MEDICINE

## 2021-04-30 PROCEDURE — 1123F ACP DISCUSS/DSCN MKR DOCD: CPT | Performed by: INTERNAL MEDICINE

## 2021-04-30 PROCEDURE — 99214 OFFICE O/P EST MOD 30 MIN: CPT | Performed by: INTERNAL MEDICINE

## 2021-04-30 NOTE — PROGRESS NOTES
Kristin Mccullough am scribing for and in the presence of Rolando Fitzgerald MD, F.A.C.C..      Patient: Diana Rios  : 1935  Date of Visit: 2021    REASON FOR VISIT / CONSULTATION: Follow-up (Hx: lightheaded, ASHD, s/p stent, HTN, HLD, CAD, CKD stage 3b. pt is doing well today. he was in the ED for a TIA on . has had some dizziness with vertigo Denies: CP, SOB, palps)      History of Present Illness:        Dear Melanie Sousa, APRN - CNP    I had the pleasure of seeing Diana Rios in my office today. Mr. Uday Mcwilliams is a 80 y.o. male who presented for follow-up. He initially presented with a chest tightness. Subsequent cardiac catheterizations showed two-vessel coronary artery disease. Patient status post PCI to LAD and RCA 3/28/2019. He denies any prior history of myocardial infarction or heart failure. No significant heart rhythm problem. He does have a long history of high cholesterol. He is a past smoker and has quit over 40 years ago. His mother had heart problems unsure about fathers history. S/P Heart Cath done on 3/28/019, S/P PCI to mid LAD and mid RCA. ECG 20 showed sinus rhythm with APCs and long first-degree AV block. No acute ischemic changes. Holter monitor worn on 20: No heart rate or rhythm abnormalities that can clearly explain his dizziness. Normal sinus rhythm with rare PAC's and PVC's with 1 PVC triplet. Carotid ultrasound on 2020 showed mild bilateral internal carotid artery stenosis. Bilateral vertebral arteries were patent with antegrade flow. TIA on 2021    Mr. Uday Mcwilliams is here today for a follow up. He was in the ED on 2021, he said he had gotten up and his left leg felt like he was standing on needles and he could not lift it. His left arm was also numb. His right leg was fine. He was diagnosed with a TIA. He said he was going to be transferred but there were no beds available.  He had an MRI that showed no acute stroke and patient was discharged home. The plan is to follow-up with neurology. Patient is requesting to see neurology here in [place]. I will make a referral.    He does have a history of vertigo. He says he sits up, counts to 10, stands up counts to 10 and before he walks he counts to 10. MRI done on 4/9/2021 showed chronic microvascular disease without acute intracranial abnormality. He is describing his breathing as first thing in the morning he feels a jab in his chest and a pressure. He gets short of breath. He runs out of energy and strength quickly. He does help with household chores, he can wax the floors on his knees. He does use a can for ambulation. He does have a cough in the morning, it does have phlegm and he keeps an eye on the color of it. No palpitations. No falls, near falls. No presyncope or syncopal episode. No abdominal pain, bleeding issues, problems with his current medications or any other issues at this time. No nausea or vomiting. He does use stool softener If he notices his stools getting hard, he does have a bowel movement daily. His weight is stable around 170. He does stay very hydrated. Exercise Tolerance: Mr. Ludy Camarena reports that he has a fairly good exercise tolerance. His says that he works an hour and rests two hours.      PAST MEDICAL HISTORY:        Past Medical History:   Diagnosis Date    Abnormal stress test 2017    Arrhythmia     ASHD (arteriosclerotic heart disease)     CKD (chronic kidney disease)     Closed traumatic fracture of right femur with routine healing 1955    trauma while in [employer], plate and scews placed and removed    COPD (chronic obstructive pulmonary disease) (HCC)     Elevated PSA     Enlarged prostate     Gallstones     Gout     History of kidney stones     Hydrocele in adult     Hyperlipidemia     Hypertension     Renal stones     Trigger finger     Vertigo     Vertigo     Wrist fracture, left        CURRENT ALLERGIES: Penicillins REVIEW OF SYSTEMS: 14 systems were reviewed. Pertinent positives and negatives as above, all else negative. Past Surgical History:   Procedure Laterality Date    APPENDECTOMY      22 yo    CARDIAC CATHETERIZATION Left 2019    [facility] : OMAR to mid RCA and mid LAD weith Dr. Christine Rutledge    plate and screws s/p trauma  and taken out in [address] [place] [address]  2012    LITHOTRIPSY      PROSTATE BIOPSY  10/22/1998, 2004    TOTAL HIP ARTHROPLASTY Right     Social History:  Social History     Tobacco Use    Smoking status: Former Smoker     Packs/day: 1.00     Years: 25.00     Pack years: 25.00     Quit date: 1974     Years since quittin.3    Smokeless tobacco: Never Used   Substance Use Topics    Alcohol use: No     Frequency: Never     Binge frequency: Never     Comment: quit 38 years ago.  Drug use: No        CURRENT MEDICATIONS:        Outpatient Medications Marked as Taking for the 21 encounter (Office Visit) with Maral White MD   Medication Sig Dispense Refill    nitroGLYCERIN (NITROSTAT) 0.4 MG SL tablet Place 1 tablet under the tongue every 5 minutes as needed for Chest pain 25 tablet 2    metoprolol succinate (TOPROL XL) 25 MG extended release tablet Take 0.5 tablets by mouth daily 30 tablet 3    amLODIPine (NORVASC) 2.5 MG tablet Take 1 tablet by mouth daily 30 tablet 3    clopidogrel (PLAVIX) 75 MG tablet take 1 tablet by mouth once daily (Patient taking differently: Take 75 mg by mouth daily ) 90 tablet 3    ezetimibe (ZETIA) 10 MG tablet Take 1 tablet by mouth daily 90 tablet 3    zinc gluconate 50 MG tablet Take 50 mg by mouth daily      Omega-3 Fatty Acids (FISH OIL) 1000 MG CAPS Take 3,000 mg by mouth daily       aspirin 81 MG EC tablet Take 81 mg by mouth daily. FAMILY HISTORY: family history includes Arrhythmia in his sister;  Heart Attack in his father and mother; Heart Disease in his father and mother; Hypertension in his mother; Kidney Disease in his brother; No Known Problems in his brother; Parkinsonism in his father. Physical Examination:      /64 (Site: Left Upper Arm, Position: Sitting, Cuff Size: Medium Adult)   Pulse 77   Resp 18   Ht 5' 7.99\" (1.727 m)   Wt 172 lb 9.6 oz (78.3 kg)   SpO2 93%   BMI 26.25 kg/m²  Body mass index is 26.25 kg/m². Constitutional: He is oriented to person. He appears well-developed and well-nourished. In no acute distress. HEENT: Normocephalic and atraumatic. No JVD present. Carotid bruit is not present. No mass and no thyromegaly present. No lymphadenopathy present. Cardiovascular: Normal rate, regular rhythm, normal heart sounds. Exam reveals no gallop and no friction rubs. No murmur was heard. .  Pulmonary/Chest: Effort normal and breath sounds normal. No respiratory distress. He has no wheezes, rhonchi or rales. Abdominal: Soft, non-tender. Bowel sounds and aorta are normal. He exhibits no organomegaly, mass or bruit. Extremities: No edema. No cyanosis or clubbing. 2+ radial and carotid pulses. Distal extremity pulses: 2+ bilaterally. Neurological: He is alert and oriented to person. No evidence of gross cranial nerve deficit. Coordination appeared normal.   Skin: Skin is warm and dry. There is no rash or diaphoresis. Psychiatric: He has a normal mood and affect.  His speech is normal and behavior is normal.      MOST RECENT LABS ON RECORD:   Lab Results   Component Value Date    WBC 7.4 04/08/2021    HGB 13.8 04/08/2021    HCT 43.6 04/08/2021     04/08/2021    CHOL 109 10/26/2020    TRIG 120 10/26/2020    HDL 40 (L) 10/26/2020    ALT 9 04/08/2021    AST 19 04/08/2021     04/08/2021    K 3.9 04/08/2021     04/08/2021    CREATININE 1.59 (H) 04/08/2021    BUN 24 (H) 04/08/2021    CO2 23 04/08/2021    PSA 9.27 (H) 07/18/2012    INR 1.1 04/08/2021    LABA1C 5.4 01/21/2019  (H) 07/07/2020       ASSESSMENT:     1. Lightheadedness    2. ASHD (arteriosclerotic heart disease)    3. S/P angioplasty with stent    4. Essential hypertension    5. Mixed hyperlipidemia    6. TIA (transient ischemic attack)       PLAN:      · Transient ischemic attack: on 4/8/2021  Antiplatelet Agent: Continue aspirin 81 mg daily and Plavix 75 mg daily. · Restart Zetia. History of statin intolerance. · I reviewed his CTA of the head and neck and MRI of the brain. 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 · Currently has no neurological deficit. · He wants to follow-up with neurology here in [place]. I will make a referral to     · Lightheadedness/dizziness:   · His blood pressure is very well controlled in the office today. · His stage III chronic kidney disease  · Counseled regarding adequate hydration. · Beta Blocker: Continue Metoprolol succinate (Toprol XL) 25 mg 1/2 tab daily. · Calcium Channel Blocker: Continue amlodipine (Norvasc) 5 mg 1/2 tab once daily. · Nonpharmacologic counseling: Because of his condition, I reminded him to try and keep himself well-hydrated and to take extra time when moving from laying to sitting, sitting to standing and standing to walking. I also explained to him to help improve his symptoms he should include  1 or 2 L of sports drinks daily, knee-high compressions stockings. · Atherosclerotic Heart Disease: S/P Stents done on 3/28 by Dr. Mony Maddox Antiplatelet Agent: Continue aspirin 81 mg daily and Plavix 75 mg daily.  Beta Blocker: Continue Metoprolol succinate (Toprol XL) 25 mg 1/2 tab daily.  Anti-anginal medications: Continue amlodipine (Norvasc) 5 mg 1/2 tab once daily.  Cholesterol Reduction Therapy: Continue ezetimide (Zetia) 10 mg daily.  Essential Hypertension: Borderline controlled   o Beta Blocker: Continue Metoprolol succinate (Toprol XL) 25 mg 1/2 tab daily.   · Calcium Channel Blocker: Continue amlodipine (Norvasc) 5 mg 1/2 tab once daily. · Hyperlipidemia: Mixed - Last LDL done on 45 mg/ dL on 10/26/2020  · Cholesterol Reduction Therapy: Continue ezetimide (Zetia) 10 mg daily. · Lack of energy:  ·  Additional Testing List: I took the liberty of ordering a TSH with reflex T4. I told them that they could get their lab work performed at the location of their choosing, unfortunately, if the lab work was not performed at a [facility]) facility I could not guarantee my ability to follow up with them on their results. I also ordered a vitamin b12, iron and TIBC, vitamin D,  and folate level. FOLLOW UP:   I told Mr. Wilmer Smith to call my office if he had any problems, but otherwise I asked him to Return in about 6 months (around 10/30/2021). However, I would be happy to see him sooner should the need arise. Sincerely,  Jean Rudolph MD, F.A.C.C. [facility] Cardiology Specialist     [address]  Phone: [phone], Fax: [phone]     I believe that the risk of significant morbidity and mortality related to the patient's current medical conditions are: intermediate-high. >30 minutes were spent during prep work, discussion and exam of the patient, and follow up documentation and all of their questions were answered. The documentation recorded by the scribe, accurately and completely reflects the services I personally performed and the decisions made by me. Jean Rudolph MD, F.A.C.C.  April 30, 2021

## 2021-04-30 NOTE — PATIENT INSTRUCTIONS
SURVEY:    You may be receiving a survey from [employer] regarding your visit today. Please complete the survey to enable us to provide the highest quality of care to you and your family. If you cannot score us a very good on any question, please call the office to discuss how we could have made your experience a very good one. Thank you.

## 2021-05-01 LAB
FOLATE: >25 NG/ML
IRON SATURATION: 23 % SATURATION (ref 20–50)
IRON, SERUM: 80 UG/DL (ref 50–212)
TOTAL IRON BINDING CAPACITY: 349 UG/DL (ref 250–425)
TSH SERPL DL<=0.05 MIU/L-ACNC: 3.1 UIU/ML (ref 0.49–4.67)
VITAMIN B-12: 271 PG/ML (ref 180–914)
VITAMIN D 25-HYDROXY: 32.4 NG/ML (ref 30–100)

## 2021-05-03 ENCOUNTER — TELEPHONE (OUTPATIENT)
Dept: CARDIOLOGY | Age: 86
End: 2021-05-03

## 2021-05-03 NOTE — TELEPHONE ENCOUNTER
----- Message from Leo Mcintyre MD sent at 5/1/2021  7:40 PM EDT -----  Thyroid function, Vit P88, folic acid level, iron level and Vitamin D levels are all  Normal. Thank you

## 2021-05-18 ENCOUNTER — TELEPHONE (OUTPATIENT)
Dept: UROLOGY | Age: 86
End: 2021-05-18

## 2021-05-18 NOTE — TELEPHONE ENCOUNTER
Okay to cancel, we will be unable to refill any medication for him as he did cancel his appointment.

## 2021-05-20 ENCOUNTER — OFFICE VISIT (OUTPATIENT)
Dept: NEUROLOGY | Age: 86
End: 2021-05-20
Payer: MEDICARE

## 2021-05-20 VITALS
RESPIRATION RATE: 18 BRPM | DIASTOLIC BLOOD PRESSURE: 63 MMHG | TEMPERATURE: 98.3 F | SYSTOLIC BLOOD PRESSURE: 120 MMHG | HEIGHT: 68 IN | HEART RATE: 79 BPM | WEIGHT: 169.5 LBS | BODY MASS INDEX: 25.69 KG/M2

## 2021-05-20 DIAGNOSIS — G45.9 TIA (TRANSIENT ISCHEMIC ATTACK): Primary | ICD-10-CM

## 2021-05-20 PROCEDURE — 4040F PNEUMOC VAC/ADMIN/RCVD: CPT | Performed by: NEUROMUSCULOSKELETAL MEDICINE, SPORTS MEDICINE

## 2021-05-20 PROCEDURE — 1036F TOBACCO NON-USER: CPT | Performed by: NEUROMUSCULOSKELETAL MEDICINE, SPORTS MEDICINE

## 2021-05-20 PROCEDURE — G8417 CALC BMI ABV UP PARAM F/U: HCPCS | Performed by: NEUROMUSCULOSKELETAL MEDICINE, SPORTS MEDICINE

## 2021-05-20 PROCEDURE — 99213 OFFICE O/P EST LOW 20 MIN: CPT | Performed by: NEUROMUSCULOSKELETAL MEDICINE, SPORTS MEDICINE

## 2021-05-20 PROCEDURE — G8427 DOCREV CUR MEDS BY ELIG CLIN: HCPCS | Performed by: NEUROMUSCULOSKELETAL MEDICINE, SPORTS MEDICINE

## 2021-05-20 PROCEDURE — 1123F ACP DISCUSS/DSCN MKR DOCD: CPT | Performed by: NEUROMUSCULOSKELETAL MEDICINE, SPORTS MEDICINE

## 2021-05-20 NOTE — PROGRESS NOTES
NEUROLOGY Follow up    Patient Name:  Ny Bonds  :   1935  Clinic Visit Date: 2021    I saw Mr. Ny Bonds  in the neurology clinic today for follow-up evaluation after having had a TIA episode on 2021. 59-year-old gentleman with a history of hypertension hyperlipidemia, chronic intermittent vestibular dysfunction with vertigo, presented to the emergency room at [facility] on 2021 with a history of sudden onset of left leg weakness and milder left upper extremity weakness, without paresthesia, facial numbness slurred speech or abnormal visual symptoms. He drove himself to the emergency room where he was diagnosed as  having had a probable TIA. CT scan of the brain was found to be unremarkable. Subsequent MRI of the brain demonstrated a very small area of abnormality on the T2 weighted diffusion images in the right posterior frontal periventricular area. Blood pressure was mildly elevated without cardiac arrhythmia. He has been discharged home in a satisfactory condition. He is presently on combination aspirin and Plavix and doing well without any recurrent significant neurological symptoms. He has had intermittent vertigo and poor balance on and off for the past couple of years. REVIEW OF SYSTEMS    Constitutional Weight changes: absent, change in appetite: absent Fatigue: present; Fevers : absent, Any recent hospitalizations:  absent   HEENT Ears: normal,  Visual disturbance: absent   Respiratory Shortness of breath: present, choking:  absent, Cough: absent, Snoring : absent   Cardiovascular Chest pain: absent, Leg swelling :absent, palpitations : absent, fainting : absent   GI Constipation: absent, Diarrhea: absent, Swallowing change: absent    Urinary frequency: absent, Urinary urgency: absent, Urinary incontinence: absent   Musculoskeletal Neck pain: present, Back pain: present, Stiffness: absent, Muscle pain: absent, Joint pain: absent, restless leg : absent Dermatological Hair loss: absent, Skin changes: absent   Neurological Confusion: absent, Trouble concentrating: present, Seizures: absent;  Memory loss: absent, balance problem: present, Dizziness: absent, vertigo: present, Weakness: absent, Numbness absent, Tremor: absent, Spasm: absent, involuntary movement: present, Speech difficulty: present, Headache: present, Light sensitivity: absent   Psychiatric Anxiety: absent, Depression  absent, drug abuse: absent, Hallucination: absent, mood disorder: absent, Suicidal ideations absent   Hematologic Abnormal bleeding: absent, Anemia: absent, Lymph gland changes: absent Clotting disorder: absent     Past Medical History:   Diagnosis Date    Abnormal stress test 2017    Arrhythmia     ASHD (arteriosclerotic heart disease)     CKD (chronic kidney disease)     Closed traumatic fracture of right femur with routine healing 1955    trauma while in [employer], plate and scews placed and removed    COPD (chronic obstructive pulmonary disease) (HCC)     Elevated PSA     Enlarged prostate     Gallstones     Gout     History of kidney stones     Hydrocele in adult     Hyperlipidemia     Hypertension     Renal stones     Trigger finger     Vertigo     Vertigo     Wrist fracture, left        Past Surgical History:   Procedure Laterality Date    APPENDECTOMY      22 yo    CARDIAC CATHETERIZATION Left 03/28/2019    [facility]    plate and screws s/p trauma  and taken out in [address] [place] [address]  9/12/2012    LITHOTRIPSY      PROSTATE BIOPSY  10/22/1998, 8/26/2004    TOTAL HIP ARTHROPLASTY Right 1999       Social History     Socioeconomic History    Marital status:      Spouse name: Not on file    Number of children: Not on file    Years of education: Not on file    Highest education level: Not on file   Occupational History    Not on file   Tobacco Use    Smoking status: Former Smoker     Packs/day: 1.00     Years: 25.00     Pack years: 25.00     Quit date: 1974     Years since quittin.3    Smokeless tobacco: Never Used   Vaping Use    Vaping Use: Never used   Substance and Sexual Activity    Alcohol use: No     Comment: quit 38 years ago.  Drug use: No    Sexual activity: Yes     Partners: Female   Other Topics Concern    Not on file   Social History Narrative    Not on file     Social Determinants of Health     Financial Resource Strain:     Difficulty of Paying Living Expenses:    Food Insecurity:     Worried About Running Out of Food in the Last Year:     [address] in the Last Year:    Transportation Needs:     Lack of Transportation (Medical):      Lack of Transportation (Non-Medical):    Physical Activity:     Days of Exercise per Week:     Minutes of Exercise per Session:    Stress:     Feeling of Stress :    Social Connections:     Frequency of Communication with Friends and Family:     Frequency of Social Gatherings with Friends and Family:     Attends Voodoo Services:     Active Member of Clubs or Organizations:     Attends Club or Organization Meetings:     Marital Status:    Intimate Partner Violence:     Fear of Current or Ex-Partner:     Emotionally Abused:     Physically Abused:     Sexually Abused:        Family History   Problem Relation Age of Onset    Heart Disease Mother     Hypertension Mother     Heart Attack Mother     Parkinsonism Father     Heart Attack Father     Heart Disease Father     Kidney Disease Brother         dialysis    Arrhythmia Sister     No Known Problems Brother        Current Outpatient Medications   Medication Sig Dispense Refill    metoprolol succinate (TOPROL XL) 25 MG extended release tablet Take 0.5 tablets by mouth daily 30 tablet 3    amLODIPine (NORVASC) 2.5 MG tablet Take 1 tablet by mouth daily 30 tablet 3    clopidogrel (PLAVIX) 75 MG tablet take 1 tablet by mouth once daily (Patient taking differently: Take 75 mg by mouth daily ) 90 tablet 3    ezetimibe (ZETIA) 10 MG tablet Take 1 tablet by mouth daily 90 tablet 3    zinc gluconate 50 MG tablet Take 50 mg by mouth daily      Omega-3 Fatty Acids (FISH OIL) 1000 MG CAPS Take 3,000 mg by mouth daily       aspirin 81 MG EC tablet Take 81 mg by mouth daily.  nitroGLYCERIN (NITROSTAT) 0.4 MG SL tablet Place 1 tablet under the tongue every 5 minutes as needed for Chest pain 25 tablet 2     No current facility-administered medications for this visit. DATA:  Lab Results   Component Value Date    WBC 7.4 04/08/2021    HGB 13.8 04/08/2021     04/08/2021    CHOL 109 10/26/2020    TRIG 120 10/26/2020    HDL 40 (L) 10/26/2020    ALT 9 04/08/2021    AST 19 04/08/2021     04/08/2021    K 3.9 04/08/2021     04/08/2021    CREATININE 1.59 (H) 04/08/2021    BUN 24 (H) 04/08/2021    CO2 23 04/08/2021    TSH 3.10 04/30/2021    INR 1.1 04/08/2021    LABA1C 5.4 01/21/2019       /63 (Site: Left Upper Arm, Position: Sitting, Cuff Size: Medium Adult)   Pulse 79   Temp 98.3 °F (36.8 °C) (Temporal)   Resp 18   Ht 5' 8\" (1.727 m)   Wt 169 lb 8 oz (76.9 kg)   BMI 25.77 kg/m²     NEUROLOGICAL EXAMINATION:     MENTAL STATUS: Patient is alert and oriented. There is no confusion or aphasia. Memory is normal.     CRANIAL NERVES: Pupils are equal and reactive. EOMS are equal in all directions. There is no nystagmus or any other abnormal eye movements. Facial sensation is normal.  There is no facial weakness. There is no loss of hearing. Palate and tongue movements are normal.     MOTOR EXAMINATION: Muscle tone is normal in all the limbs. There is no focal weakness. Muscle strength is 5/5 in both upper and lower limbs. There are no abnormal limb movements.      SENSORY EXAMINATION: Normal.     STRETCH REFLEXES:.  Symmetrical in both the upper and lower limbs. GAIT: There is no ataxia. Age-related gait. IMPRESSION:    1. TIA with a small area of abnormality in the right periventricular region of the right frontal lobe, which might explain his transient left-sided weakness as noted above  2. Hypertension. 3.  Hypercholesterolemia  4. History of intermittent spells of vertigo and poor balance, presumed to be secondary to vestibular dysfunction    PLAN:    1. Continue aspirin and Plavix. 2.  Follow-up in the office as needed    NOTE: This neurology evaluation is part of outpatient coverage at [facility]  1-2 days per week. Patients requiring frequent evaluations or uncomfortable with potential 3-4 day turnaround on questions or calls  may be better served by a neurologist in the area full time. [facility] is available for outpatient visits and procedures including EMG/NCS. Non-[facility] neurologists also practice in [facility] (Dr. Billie Harris) and [facility] (Onel Alonzo).        Wu Puga MD   5/20/2021  10:26 AM        CC: Sofia Litten, APRN-CNP

## 2021-05-30 ENCOUNTER — APPOINTMENT (OUTPATIENT)
Dept: CT IMAGING | Age: 86
End: 2021-05-30
Payer: MEDICARE

## 2021-05-30 ENCOUNTER — HOSPITAL ENCOUNTER (EMERGENCY)
Age: 86
Discharge: HOME | End: 2021-05-30
Attending: EMERGENCY MEDICINE
Payer: MEDICARE

## 2021-05-30 VITALS
DIASTOLIC BLOOD PRESSURE: 75 MMHG | HEART RATE: 67 BPM | OXYGEN SATURATION: 97 % | TEMPERATURE: 97 F | RESPIRATION RATE: 16 BRPM | SYSTOLIC BLOOD PRESSURE: 173 MMHG

## 2021-05-30 DIAGNOSIS — G44.89 OTHER HEADACHE SYNDROME: Primary | ICD-10-CM

## 2021-05-30 DIAGNOSIS — I10 ESSENTIAL HYPERTENSION: ICD-10-CM

## 2021-05-30 LAB
ABSOLUTE EOS #: 0.23 K/UL (ref 0–0.44)
ABSOLUTE IMMATURE GRANULOCYTE: 0.03 K/UL (ref 0–0.3)
ABSOLUTE LYMPH #: 1.78 K/UL (ref 1.1–3.7)
ABSOLUTE MONO #: 0.61 K/UL (ref 0.1–1.2)
ANION GAP SERPL CALCULATED.3IONS-SCNC: 12 MMOL/L (ref 9–17)
BASOPHILS # BLD: 1 % (ref 0–2)
BASOPHILS ABSOLUTE: 0.05 K/UL (ref 0–0.2)
BUN BLDV-MCNC: 24 MG/DL (ref 8–23)
BUN/CREAT BLD: 17 (ref 9–20)
CALCIUM SERPL-MCNC: 9.8 MG/DL (ref 8.6–10.4)
CHLORIDE BLD-SCNC: 101 MMOL/L (ref 98–107)
CO2: 24 MMOL/L (ref 20–31)
CREAT SERPL-MCNC: 1.42 MG/DL (ref 0.7–1.2)
DIFFERENTIAL TYPE: ABNORMAL
EOSINOPHILS RELATIVE PERCENT: 3 % (ref 1–4)
GFR AFRICAN AMERICAN: 57 ML/MIN
GFR NON-AFRICAN AMERICAN: 47 ML/MIN
GFR SERPL CREATININE-BSD FRML MDRD: ABNORMAL ML/MIN/{1.73_M2}
GFR SERPL CREATININE-BSD FRML MDRD: ABNORMAL ML/MIN/{1.73_M2}
GLUCOSE BLD-MCNC: 100 MG/DL (ref 70–99)
HCT VFR BLD CALC: 44.4 % (ref 40.7–50.3)
HEMOGLOBIN: 14 G/DL (ref 13–17)
IMMATURE GRANULOCYTES: 0 %
LYMPHOCYTES # BLD: 20 % (ref 24–43)
MCH RBC QN AUTO: 26.8 PG (ref 25.2–33.5)
MCHC RBC AUTO-ENTMCNC: 31.5 G/DL (ref 28.4–34.8)
MCV RBC AUTO: 85.1 FL (ref 82.6–102.9)
MONOCYTES # BLD: 7 % (ref 3–12)
NRBC AUTOMATED: 0 PER 100 WBC
PDW BLD-RTO: 12.7 % (ref 11.8–14.4)
PLATELET # BLD: 253 K/UL (ref 138–453)
PLATELET ESTIMATE: ABNORMAL
PMV BLD AUTO: 9.2 FL (ref 8.1–13.5)
POTASSIUM SERPL-SCNC: 4.2 MMOL/L (ref 3.7–5.3)
RBC # BLD: 5.22 M/UL (ref 4.21–5.77)
RBC # BLD: ABNORMAL 10*6/UL
SEDIMENTATION RATE, ERYTHROCYTE: 13 MM (ref 0–20)
SEG NEUTROPHILS: 69 % (ref 36–65)
SEGMENTED NEUTROPHILS ABSOLUTE COUNT: 6.09 K/UL (ref 1.5–8.1)
SODIUM BLD-SCNC: 137 MMOL/L (ref 135–144)
WBC # BLD: 8.8 K/UL (ref 3.5–11.3)
WBC # BLD: ABNORMAL 10*3/UL

## 2021-05-30 PROCEDURE — 70450 CT HEAD/BRAIN W/O DYE: CPT

## 2021-05-30 PROCEDURE — 80048 BASIC METABOLIC PNL TOTAL CA: CPT

## 2021-05-30 PROCEDURE — 96375 TX/PRO/DX INJ NEW DRUG ADDON: CPT

## 2021-05-30 PROCEDURE — 85652 RBC SED RATE AUTOMATED: CPT

## 2021-05-30 PROCEDURE — 85025 COMPLETE CBC W/AUTO DIFF WBC: CPT

## 2021-05-30 PROCEDURE — 6360000002 HC RX W HCPCS: Performed by: EMERGENCY MEDICINE

## 2021-05-30 PROCEDURE — 99283 EMERGENCY DEPT VISIT LOW MDM: CPT

## 2021-05-30 PROCEDURE — 96374 THER/PROPH/DIAG INJ IV PUSH: CPT

## 2021-05-30 RX ORDER — PROCHLORPERAZINE EDISYLATE 5 MG/ML
10 INJECTION INTRAMUSCULAR; INTRAVENOUS ONCE
Status: COMPLETED | OUTPATIENT
Start: 2021-05-30 | End: 2021-05-30

## 2021-05-30 RX ORDER — DIPHENHYDRAMINE HYDROCHLORIDE 50 MG/ML
25 INJECTION INTRAMUSCULAR; INTRAVENOUS ONCE
Status: COMPLETED | OUTPATIENT
Start: 2021-05-30 | End: 2021-05-30

## 2021-05-30 RX ADMIN — PROCHLORPERAZINE EDISYLATE 10 MG: 5 INJECTION INTRAMUSCULAR; INTRAVENOUS at 05:13

## 2021-05-30 RX ADMIN — DIPHENHYDRAMINE HYDROCHLORIDE 25 MG: 50 INJECTION, SOLUTION INTRAMUSCULAR; INTRAVENOUS at 05:13

## 2021-05-30 ASSESSMENT — ENCOUNTER SYMPTOMS
GASTROINTESTINAL NEGATIVE: 1
ALLERGIC/IMMUNOLOGIC NEGATIVE: 1
RESPIRATORY NEGATIVE: 1
EYES NEGATIVE: 1

## 2021-05-30 ASSESSMENT — PAIN SCALES - GENERAL
PAINLEVEL_OUTOF10: 2
PAINLEVEL_OUTOF10: 0
PAINLEVEL_OUTOF10: 3

## 2021-05-30 ASSESSMENT — PAIN DESCRIPTION - LOCATION: LOCATION: HEAD

## 2021-05-30 ASSESSMENT — PAIN DESCRIPTION - PAIN TYPE: TYPE: ACUTE PAIN

## 2021-05-30 NOTE — ED PROVIDER NOTES
[facility]  EMERGENCY DEPARTMENT ENCOUNTER      Pt Name: Roxana Martinez  MRN: [id number]  [place] 1935  Date of evaluation: 5/30/2021  Provider: Cole Verde MD    CHIEF COMPLAINT       Chief Complaint   Patient presents with    Headache     frontal, onset yesterday. HISTORY OF PRESENT ILLNESS   (Location/Symptom, Timing/Onset, Context/Setting, Quality, Duration, Modifying Factors, Severity)  Note limiting factors. Roxana Martinez is a 80 y.o. male who presents to the emergency department     80year-old patient presents to emergency department with history for gradual onset of frontal headache beginning day prior to ED arrival at approximately 4 PM.  There is been no history for fever. No associated photophobia. No neck stiffness. The patient has had Covid vaccinations. Patient denies any focal weakness along upper lower extremities. Patient relates that this headache is a 3 out of 10. Nursing Notes were reviewed. REVIEW OF SYSTEMS    (2-9 systems for level 4, 10 or more for level 5)     Review of Systems   Constitutional: Negative. HENT: Negative. Eyes: Negative. Respiratory: Negative. Cardiovascular: Negative. Gastrointestinal: Negative. Endocrine: Negative. Genitourinary: Negative. Musculoskeletal: Negative. Skin: Negative. Allergic/Immunologic: Negative. Neurological: Positive for headaches. Hematological: Negative. Except as noted above the remainder of the review of systems was reviewed and negative.        PAST MEDICAL HISTORY     Past Medical History:   Diagnosis Date    Abnormal stress test 2017    Arrhythmia     ASHD (arteriosclerotic heart disease)     CKD (chronic kidney disease)     Closed traumatic fracture of right femur with routine healing 1955    trauma while in [employer], plate and scews placed and removed    COPD (chronic obstructive pulmonary disease) ([facility] [address]     Elevated PSA     Enlarged prostate  Gallstones     Gout     History of kidney stones     Hydrocele in adult     Hyperlipidemia     Hypertension     Renal stones     Trigger finger     Vertigo     Vertigo     Wrist fracture, left          SURGICAL HISTORY       Past Surgical History:   Procedure Laterality Date    APPENDECTOMY      22 yo    CARDIAC CATHETERIZATION Left 03/28/2019    Right [facility]/ : [facility]    plate and screws s/p trauma  and taken out in [address] [place] [address]  9/12/2012    LITHOTRIPSY      PROSTATE BIOPSY  10/22/1998, 8/26/2004    TOTAL HIP ARTHROPLASTY Right 1999         CURRENT MEDICATIONS       Previous Medications    AMLODIPINE (NORVASC) 2.5 MG TABLET    Take 1 tablet by mouth daily    ASPIRIN 81 MG EC TABLET    Take 81 mg by mouth daily.       CLOPIDOGREL (PLAVIX) 75 MG TABLET    take 1 tablet by mouth once daily    EZETIMIBE (ZETIA) 10 MG TABLET    Take 1 tablet by mouth daily    HANDICAP PLACARD MISC    by Does not apply route EXPIRATION DATE: 3 YEARS    METOPROLOL SUCCINATE (TOPROL XL) 25 MG EXTENDED RELEASE TABLET    Take 0.5 tablets by mouth daily    NITROGLYCERIN (NITROSTAT) 0.4 MG SL TABLET    Place 1 tablet under the tongue every 5 minutes as needed for Chest pain    OMEGA-3 FATTY ACIDS (FISH OIL) 1000 MG CAPS    Take 3,000 mg by mouth daily     ZINC GLUCONATE 50 MG TABLET    Take 50 mg by mouth daily       ALLERGIES     Penicillins    FAMILY HISTORY       Family History   Problem Relation Age of Onset    Heart Disease Mother     Hypertension Mother     Heart Attack Mother     Parkinsonism Father     Heart Attack Father     Heart Disease Father     Kidney Disease Brother         dialysis    Arrhythmia Sister     No Known Problems Brother           SOCIAL HISTORY       Social History     Socioeconomic History    Marital status:      Spouse name: Not on file    Number of children: Not on file    Years of education: Not on file    Highest education level: Not on file   Occupational History    Not on file   Tobacco Use    Smoking status: Former Smoker     Packs/day: 1.00     Years: 25.00     Pack years: 25.00     Quit date: 1974     Years since quittin.3    Smokeless tobacco: Never Used   Vaping Use    Vaping Use: Never used   Substance and Sexual Activity    Alcohol use: No     Comment: quit 38 years ago.  Drug use: No    Sexual activity: Yes     Partners: Female   Other Topics Concern    Not on file   Social History Narrative    Not on file     Social Determinants of Health     Financial Resource Strain:     Difficulty of Paying Living Expenses:    Food Insecurity:     Worried About Running Out of Food in the Last Year:     [address] in the Last Year:    Transportation Needs:     Lack of Transportation (Medical):      Lack of Transportation (Non-Medical):    Physical Activity:     Days of Exercise per Week:     Minutes of Exercise per Session:    Stress:     Feeling of Stress :    Social Connections:     Frequency of Communication with Friends and Family:     Frequency of Social Gatherings with Friends and Family:     Attends Congregation Services:     Active Member of Clubs or Organizations:     Attends Club or Organization Meetings:     Marital Status:    Intimate Partner Violence:     Fear of Current or Ex-Partner:     Emotionally Abused:     Physically Abused:     Sexually Abused:        SCREENINGS        [place] Coma Scale  Eye Opening: Spontaneous  Best Verbal Response: Oriented  Best Motor Response: Obeys commands  [place] Coma Scale Score: 15               PHYSICAL EXAM    (up to 7 for level 4, 8 or more for level 5)     ED Triage Vitals   BP Temp Temp Source Pulse Resp SpO2 Height Weight   21 0441 21 0439 21 0439 21 0439 21 0439 21 0441 -- --   (!) 195/86 97 °F (36.1 °C) Temporal 81 16 99 % Physical Exam  Vitals and nursing note reviewed. Exam conducted with a chaperone present. Constitutional:       Appearance: Normal appearance. HENT:      Head: Normocephalic and atraumatic. Nose: Nose normal.      Mouth/Throat:      Mouth: Mucous membranes are dry. Eyes:      Extraocular Movements: Extraocular movements intact. Pupils: Pupils are equal, round, and reactive to light. Comments: No photophobia   Neck:      Vascular: No carotid bruit. Cardiovascular:      Rate and Rhythm: Normal rate and regular rhythm. Pulses: Normal pulses. Heart sounds: Normal heart sounds. Pulmonary:      Effort: Pulmonary effort is normal.      Breath sounds: Normal breath sounds. Abdominal:      General: Abdomen is flat. Palpations: Abdomen is soft. Tenderness: There is no abdominal tenderness. Musculoskeletal:         General: No deformity or signs of injury. Normal range of motion. Cervical back: Normal range of motion. No rigidity or tenderness. Lymphadenopathy:      Cervical: No cervical adenopathy. Skin:     General: Skin is warm and dry. Capillary Refill: Capillary refill takes less than 2 seconds. Neurological:      General: No focal deficit present. Mental Status: He is alert. Cranial Nerves: Cranial nerve deficit present. Sensory: Sensory deficit present. Motor: No weakness.       Coordination: Coordination normal.         DIAGNOSTIC RESULTS     EKG: All EKG's are interpreted by the Emergency Department Physician who either signs or Co-signs this chart in the absence of a cardiologist.      RADIOLOGY:   Non-plain film images such as CT, Ultrasound and MRI are read by the radiologist. Plain radiographic images are visualized and preliminarily interpreted by the emergency physician with the below findings:      Interpretation per the Radiologist below, if available at the time of this note:    CT Head WO Contrast   Final Result   No acute intracranial abnormality. ED BEDSIDE ULTRASOUND:   Performed by ED Physician - none    LABS:  Labs Reviewed   CBC WITH AUTO DIFFERENTIAL - Abnormal; Notable for the following components:       Result Value    Seg Neutrophils 69 (*)     Lymphocytes 20 (*)     All other components within normal limits   BASIC METABOLIC PANEL W/ REFLEX TO MG FOR LOW K - Abnormal; Notable for the following components:    Glucose 100 (*)     BUN 24 (*)     CREATININE 1.42 (*)     GFR Non- 47 (*)     GFR  57 (*)     All other components within normal limits   SEDIMENTATION RATE       All other labs were within normal range or not returned as of this dictation. EMERGENCY DEPARTMENT COURSE and DIFFERENTIAL DIAGNOSIS/MDM:   Vitals:    Vitals:    05/30/21 0530 05/30/21 0548 05/30/21 0600 05/30/21 0604   BP: (!) 163/73 (!) 162/66 (!) 155/67    Pulse:       Resp:       Temp:       TempSrc:       SpO2: 94%   96%         MDM  Number of Diagnoses or Management Options  Essential hypertension  Other headache syndrome  Diagnosis management comments: 80year-old patient presented to the emergency department with concerns of gradual onset of a headache beginning 1 day prior to ED arrival approximating 4:00. This was not a thunderclap headache. There was no associated photophobia.   No meningeal signs patient's blood pressure was elevated upon arrival (195/86)    Is noted that the patient is on Plavix-CT the brain has been requested    CT of the brain unremarkable-the patient had complete relief of his headache and declines lumbar puncture-    Patient and patient's relative in presence voiced understanding of discharge instructions and follow-up and having no additional questions or concerns discharged in improved condition        REASSESSMENT     Patient had complete relief of his headache with IV Benadryl and Compazine-patient's blood pressure also improved without antihypertensive intervention  ED Course as of May 30 0609   Sun May 30, 2021   0545 CBC Auto Differential(!):    WBC 8.8   RBC 5.22   Hemoglobin Quant 14.0   Hematocrit 44.4   MCV 85.1   MCH 26.8   MCHC 31.5   RDW 12.7   Platelet Count 653   MPV 9.2   NRBC Automated 0.0   Differential Type NOT REPORTED   Seg Neutrophils 69(!)   Lymphocytes 20(!)   Monocytes 7   Eosinophils % 3   Basophils 1   Immature Granulocytes 0   Segs Absolute 6.09   Absolute Lymph # 1.78   Absolute Mono # 0.61   Absolute Eos # 0.23   Basophils Absolute 0.05   Absolute Immature Granulocyte 0.03   WBC Morphology NOT REPOR. .. [RS]   1078 Basic Metabolic Panel w/ Reflex to MG(!):    Glucose 100(!)   BUN 24(!)   Creatinine 1.42(!)   Bun/Cre Ratio 17   Calcium 9.8   Sodium 137   Potassium 4.2   Chloride 101   CO2 24   Anion Gap 12   GFR Non- 47(!)   GFR  57(!)   GFR Comment        GFR Staging      [RS]   0546 Reexamination the patient did 545 states that he feels much better blood pressure has improved and patient refuses lumbar puncture spinal tap    [RS]   0602 CT the patient's brain no acute process radiologist read   CT Head WO Contrast [RS]   0609 Sedimentation Rate:    Sed Rate 13 [RS]      ED Course User Index  [RS] Romayne Cella, MD         CRITICAL CARE TIME   Total Critical Care time was minutes, excluding separately reportable procedures. There was a high probability of clinically significant/life threatening deterioration in the patient's condition which required my urgent intervention. CONSULTS:  None    PROCEDURES:  Unless otherwise noted below, none     Procedures    FINAL IMPRESSION      1. Other headache syndrome    2.  Essential hypertension          DISPOSITION/PLAN   DISPOSITION        PATIENT REFERRED TO:  Sofia Litten, APRN - CNP [address], [address]  [phone]    Call in 2 days        DISCHARGE MEDICATIONS:  New Prescriptions    No medications on file     Controlled

## 2021-06-15 RX ORDER — AMLODIPINE BESYLATE 2.5 MG/1
2.5 TABLET ORAL DAILY
Qty: 90 TABLET | Refills: 3 | Status: SHIPPED | OUTPATIENT
Start: 2021-06-15 | End: 2021-06-17

## 2021-09-16 ENCOUNTER — HOSPITAL ENCOUNTER (EMERGENCY)
Age: 86
Discharge: HOME | End: 2021-09-16
Payer: MEDICARE

## 2021-09-16 VITALS
DIASTOLIC BLOOD PRESSURE: 57 MMHG | SYSTOLIC BLOOD PRESSURE: 156 MMHG | OXYGEN SATURATION: 97 % | HEART RATE: 72 BPM | RESPIRATION RATE: 18 BRPM | TEMPERATURE: 97.8 F

## 2021-09-16 DIAGNOSIS — T63.461A WASP STING, ACCIDENTAL OR UNINTENTIONAL, INITIAL ENCOUNTER: Primary | ICD-10-CM

## 2021-09-16 PROCEDURE — 90471 IMMUNIZATION ADMIN: CPT | Performed by: PHYSICIAN ASSISTANT

## 2021-09-16 PROCEDURE — 99284 EMERGENCY DEPT VISIT MOD MDM: CPT

## 2021-09-16 PROCEDURE — 6370000000 HC RX 637 (ALT 250 FOR IP): Performed by: PHYSICIAN ASSISTANT

## 2021-09-16 PROCEDURE — 90715 TDAP VACCINE 7 YRS/> IM: CPT | Performed by: PHYSICIAN ASSISTANT

## 2021-09-16 PROCEDURE — 6360000002 HC RX W HCPCS: Performed by: PHYSICIAN ASSISTANT

## 2021-09-16 RX ORDER — DOXYCYCLINE 100 MG/1
100 TABLET ORAL 2 TIMES DAILY
Qty: 10 TABLET | Refills: 0 | Status: SHIPPED | OUTPATIENT
Start: 2021-09-16 | End: 2021-09-21

## 2021-09-16 RX ORDER — PREDNISONE 20 MG/1
20 TABLET ORAL ONCE
Status: COMPLETED | OUTPATIENT
Start: 2021-09-16 | End: 2021-09-16

## 2021-09-16 RX ORDER — PREDNISONE 10 MG/1
10 TABLET ORAL DAILY
Qty: 3 TABLET | Refills: 0 | Status: SHIPPED | OUTPATIENT
Start: 2021-09-16 | End: 2021-09-19

## 2021-09-16 RX ORDER — DIPHENHYDRAMINE HCL 25 MG
25 CAPSULE ORAL ONCE
Status: COMPLETED | OUTPATIENT
Start: 2021-09-16 | End: 2021-09-16

## 2021-09-16 RX ADMIN — PREDNISONE 20 MG: 20 TABLET ORAL at 12:13

## 2021-09-16 RX ADMIN — DIPHENHYDRAMINE HYDROCHLORIDE 25 MG: 25 CAPSULE ORAL at 12:13

## 2021-09-16 RX ADMIN — TETANUS TOXOID, REDUCED DIPHTHERIA TOXOID AND ACELLULAR PERTUSSIS VACCINE, ADSORBED 0.5 ML: 5; 2.5; 8; 8; 2.5 SUSPENSION INTRAMUSCULAR at 12:12

## 2021-09-16 ASSESSMENT — PAIN DESCRIPTION - ORIENTATION: ORIENTATION: LEFT

## 2021-09-16 ASSESSMENT — PAIN DESCRIPTION - LOCATION: LOCATION: ARM;NECK

## 2021-09-16 ASSESSMENT — ENCOUNTER SYMPTOMS
TROUBLE SWALLOWING: 0
SHORTNESS OF BREATH: 0
COLOR CHANGE: 1
WHEEZING: 0

## 2021-09-16 ASSESSMENT — PAIN DESCRIPTION - DESCRIPTORS: DESCRIPTORS: DISCOMFORT

## 2021-09-16 ASSESSMENT — PAIN SCALES - GENERAL: PAINLEVEL_OUTOF10: 7

## 2021-09-16 ASSESSMENT — PAIN DESCRIPTION - PAIN TYPE: TYPE: ACUTE PAIN

## 2021-09-16 NOTE — ED PROVIDER NOTES
Hydrocele in adult     Hyperlipidemia     Hypertension     Renal stones     Trigger finger     Vertigo     Vertigo     Wrist fracture, left        SURGICAL HISTORY       Past Surgical History:   Procedure Laterality Date    APPENDECTOMY      20 yo    CARDIAC CATHETERIZATION Left 2019    Right [facility]/ : [facility]    plate and screws s/p trauma  and taken out in [address] [place] [address]  2012    LITHOTRIPSY      PROSTATE BIOPSY  10/22/1998, 2004    TOTAL HIP ARTHROPLASTY Right        CURRENT MEDICATIONS       Previous Medications    AMLODIPINE (NORVASC) 2.5 MG TABLET    TAKE ONE TABLET BY MOUTH DAILY    ASPIRIN 81 MG EC TABLET    Take 81 mg by mouth daily. CLOPIDOGREL (PLAVIX) 75 MG TABLET    take 1 tablet by mouth once daily    EZETIMIBE (ZETIA) 10 MG TABLET    TAKE ONE TABLET BY MOUTH DAILY    HANDICAP PLACARD MISC    by Does not apply route EXPIRATION DATE: 3 YEARS    METOPROLOL SUCCINATE (TOPROL XL) 25 MG EXTENDED RELEASE TABLET    Take 0.5 tablets by mouth daily    NITROGLYCERIN (NITROSTAT) 0.4 MG SL TABLET    Place 1 tablet under the tongue every 5 minutes as needed for Chest pain    OMEGA-3 FATTY ACIDS (FISH OIL) 1000 MG CAPS    Take 3,000 mg by mouth daily     ZINC GLUCONATE 50 MG TABLET    Take 50 mg by mouth daily       ALLERGIES     is allergic to penicillins. FAMILY HISTORY     He indicated that his mother is . He indicated that his father is . He indicated that his sister is alive. He indicated that only one of his two brothers is alive. SOCIAL HISTORY      reports that he quit smoking about 47 years ago. He has a 25.00 pack-year smoking history. He has never used smokeless tobacco. He reports that he does not drink alcohol and does not use drugs.     PHYSICAL EXAM     INITIAL VITALS: BP (!) 156/57   Pulse 72   Temp 97.8 °F (36.6 °C) (Oral)   Resp 18   SpO2 97%      Physical Exam  Vitals and nursing note reviewed. Constitutional:       Appearance: He is well-developed. HENT:      Head: Normocephalic and atraumatic. Mouth/Throat:      Mouth: Mucous membranes are moist.   Eyes:      Pupils: Pupils are equal, round, and reactive to light. Cardiovascular:      Rate and Rhythm: Normal rate and regular rhythm. Heart sounds: Normal heart sounds. No murmur heard. Pulmonary:      Effort: Pulmonary effort is normal.      Breath sounds: Normal breath sounds. Abdominal:      General: Bowel sounds are normal.      Palpations: Abdomen is soft. Tenderness: There is no abdominal tenderness. Musculoskeletal:         General: Normal range of motion. Cervical back: Normal range of motion. Skin:     General: Skin is warm and dry. Findings: Erythema present. Comments: Redness and swelling to the left lateral wrist, has full rom and sensation is intact distally. Neurological:      Mental Status: He is alert and oriented to person, place, and time. MEDICAL DECISION MAKING:     Apply ice steroid for a few days, tylenol for pain, elevate, will start atb for a few days, use benadryl, ice. Follow up as needed, return for any concerns. No disstress    DIAGNOSTIC RESULTS     EKG: All EKG's are interpreted by the Emergency Department Physician who either signs or Co-signs this chart in the absence of acardiologist.        RADIOLOGY:Allplain film, CT, MRI, and formal ultrasound images (except ED bedside ultrasound) are read by the radiologist and the images and interpretations are directly viewed by the emergency physician. LABS:All lab results were reviewed by myself, and all abnormals are listed below.   Labs Reviewed - No data to display      EMERGENCY DEPARTMENT COURSE:   Vitals:    Vitals:    09/16/21 1032   BP: (!) 156/57   Pulse: 72   Resp: 18   Temp: 97.8 °F (36.6 °C)   TempSrc: Oral   SpO2: 97% The patient was given the following medications while in the emergency department:  Orders Placed This Encounter   Medications    predniSONE (DELTASONE) tablet 20 mg    diphenhydrAMINE (BENADRYL) capsule 25 mg    Tetanus-Diphth-Acell Pertussis (BOOSTRIX) injection 0.5 mL    predniSONE (DELTASONE) 10 MG tablet     Sig: Take 1 tablet by mouth daily for 3 days     Dispense:  3 tablet     Refill:  0    doxycycline monohydrate (ADOXA) 100 MG tablet     Sig: Take 1 tablet by mouth 2 times daily for 5 days     Dispense:  10 tablet     Refill:  0       -------------------------      CRITICAL CARE:     CONSULTS:  None    PROCEDURES:  Procedures    FINAL IMPRESSION      1.  Wasp sting, accidental or unintentional, initial encounter          DISPOSITION/PLAN   DISPOSITION Decision To Discharge 09/16/2021 12:30:53 PM      PATIENT REFERREDTO:  Florida Zhang, [address] [place] [address] [address] [place] [address], [address] [place] [address]  [phone]    Schedule an appointment as soon as possible for a visit   As needed    [facility] ED  [address] [facility]  [phone]    If symptoms worsen      DISCHARGEMEDICATIONS:  New Prescriptions    DOXYCYCLINE MONOHYDRATE (ADOXA) 100 MG TABLET    Take 1 tablet by mouth 2 times daily for 5 days    PREDNISONE (DELTASONE) 10 MG TABLET    Take 1 tablet by mouth daily for 3 days       (Please note that portions of this note were completed with a voice recognition program.  Efforts were made to edit thedictations but occasionally words are mis-transcribed.)    TACHO Mckeon PA-C  09/16/21 [id number]

## 2021-10-26 PROBLEM — J44.9 CHRONIC OBSTRUCTIVE PULMONARY DISEASE, UNSPECIFIED COPD TYPE (HCC): Status: ACTIVE | Noted: 2021-10-26

## 2021-11-01 ENCOUNTER — OFFICE VISIT (OUTPATIENT)
Dept: CARDIOLOGY | Age: 86
End: 2021-11-01
Payer: MEDICARE

## 2021-11-01 ENCOUNTER — HOSPITAL ENCOUNTER (OUTPATIENT)
Dept: NON INVASIVE DIAGNOSTICS | Age: 86
Discharge: HOME | End: 2021-11-01
Payer: MEDICARE

## 2021-11-01 ENCOUNTER — HOSPITAL ENCOUNTER (OUTPATIENT)
Age: 86
Discharge: HOME | End: 2021-11-01
Payer: MEDICARE

## 2021-11-01 VITALS
SYSTOLIC BLOOD PRESSURE: 153 MMHG | DIASTOLIC BLOOD PRESSURE: 67 MMHG | HEIGHT: 68 IN | BODY MASS INDEX: 25.67 KG/M2 | RESPIRATION RATE: 18 BRPM | WEIGHT: 169.4 LBS | OXYGEN SATURATION: 98 % | HEART RATE: 66 BPM

## 2021-11-01 DIAGNOSIS — R42 DIZZINESS AND GIDDINESS: ICD-10-CM

## 2021-11-01 DIAGNOSIS — G45.9 TIA (TRANSIENT ISCHEMIC ATTACK): ICD-10-CM

## 2021-11-01 DIAGNOSIS — I20.0 UNSTABLE ANGINA PECTORIS (HCC): Primary | ICD-10-CM

## 2021-11-01 DIAGNOSIS — I20.8 CHRONIC STABLE ANGINA (HCC): ICD-10-CM

## 2021-11-01 DIAGNOSIS — I10 ESSENTIAL HYPERTENSION: ICD-10-CM

## 2021-11-01 DIAGNOSIS — I20.0 UNSTABLE ANGINA PECTORIS (HCC): ICD-10-CM

## 2021-11-01 DIAGNOSIS — I25.10 CORONARY ARTERY DISEASE INVOLVING NATIVE CORONARY ARTERY OF NATIVE HEART WITHOUT ANGINA PECTORIS: ICD-10-CM

## 2021-11-01 DIAGNOSIS — E78.2 MIXED HYPERLIPIDEMIA: ICD-10-CM

## 2021-11-01 DIAGNOSIS — R07.9 CHEST PAIN, UNSPECIFIED TYPE: Primary | ICD-10-CM

## 2021-11-01 LAB
ANION GAP SERPL CALCULATED.3IONS-SCNC: 12 MMOL/L (ref 9–17)
BUN BLDV-MCNC: 24 MG/DL (ref 8–23)
BUN/CREAT BLD: 19 (ref 9–20)
CALCIUM SERPL-MCNC: 10.1 MG/DL (ref 8.6–10.4)
CHLORIDE BLD-SCNC: 105 MMOL/L (ref 98–107)
CO2: 23 MMOL/L (ref 20–31)
CREAT SERPL-MCNC: 1.26 MG/DL (ref 0.7–1.2)
GFR AFRICAN AMERICAN: >60 ML/MIN
GFR NON-AFRICAN AMERICAN: 54 ML/MIN
GFR SERPL CREATININE-BSD FRML MDRD: ABNORMAL ML/MIN/{1.73_M2}
GFR SERPL CREATININE-BSD FRML MDRD: ABNORMAL ML/MIN/{1.73_M2}
GLUCOSE BLD-MCNC: 82 MG/DL (ref 70–99)
HCT VFR BLD CALC: 45.6 % (ref 40.7–50.3)
HEMOGLOBIN: 14.2 G/DL (ref 13–17)
LV EF: 53 %
LVEF MODALITY: NORMAL
MCH RBC QN AUTO: 26.8 PG (ref 25.2–33.5)
MCHC RBC AUTO-ENTMCNC: 31.1 G/DL (ref 28.4–34.8)
MCV RBC AUTO: 86.2 FL (ref 82.6–102.9)
NRBC AUTOMATED: 0 PER 100 WBC
PDW BLD-RTO: 13.2 % (ref 11.8–14.4)
PLATELET # BLD: 233 K/UL (ref 138–453)
PMV BLD AUTO: 10.7 FL (ref 8.1–13.5)
POTASSIUM SERPL-SCNC: 4.3 MMOL/L (ref 3.7–5.3)
RBC # BLD: 5.29 M/UL (ref 4.21–5.77)
SODIUM BLD-SCNC: 140 MMOL/L (ref 135–144)
WBC # BLD: 8.7 K/UL (ref 3.5–11.3)

## 2021-11-01 PROCEDURE — G8417 CALC BMI ABV UP PARAM F/U: HCPCS | Performed by: INTERNAL MEDICINE

## 2021-11-01 PROCEDURE — G8427 DOCREV CUR MEDS BY ELIG CLIN: HCPCS | Performed by: INTERNAL MEDICINE

## 2021-11-01 PROCEDURE — G8484 FLU IMMUNIZE NO ADMIN: HCPCS | Performed by: INTERNAL MEDICINE

## 2021-11-01 PROCEDURE — 80048 BASIC METABOLIC PNL TOTAL CA: CPT

## 2021-11-01 PROCEDURE — 99215 OFFICE O/P EST HI 40 MIN: CPT | Performed by: INTERNAL MEDICINE

## 2021-11-01 PROCEDURE — 36415 COLL VENOUS BLD VENIPUNCTURE: CPT

## 2021-11-01 PROCEDURE — 93306 TTE W/DOPPLER COMPLETE: CPT

## 2021-11-01 PROCEDURE — 4040F PNEUMOC VAC/ADMIN/RCVD: CPT | Performed by: INTERNAL MEDICINE

## 2021-11-01 PROCEDURE — 93010 ELECTROCARDIOGRAM REPORT: CPT | Performed by: INTERNAL MEDICINE

## 2021-11-01 PROCEDURE — 85027 COMPLETE CBC AUTOMATED: CPT

## 2021-11-01 PROCEDURE — 99211 OFF/OP EST MAY X REQ PHY/QHP: CPT | Performed by: INTERNAL MEDICINE

## 2021-11-01 PROCEDURE — 1123F ACP DISCUSS/DSCN MKR DOCD: CPT | Performed by: INTERNAL MEDICINE

## 2021-11-01 PROCEDURE — 93005 ELECTROCARDIOGRAM TRACING: CPT | Performed by: INTERNAL MEDICINE

## 2021-11-01 PROCEDURE — 1036F TOBACCO NON-USER: CPT | Performed by: INTERNAL MEDICINE

## 2021-11-01 RX ORDER — NITROGLYCERIN 0.4 MG/1
0.4 TABLET SUBLINGUAL EVERY 5 MIN PRN
Qty: 25 TABLET | Refills: 3 | Status: SHIPPED | OUTPATIENT
Start: 2021-11-01

## 2021-11-01 NOTE — PATIENT INSTRUCTIONS
SURVEY:    You may be receiving a survey from [employer] regarding your visit today. Please complete the survey to enable us to provide the highest quality of care to you and your family. If you cannot score us a very good on any question, please call the office to discuss how we could have made your experience a very good one. Thank you.

## 2021-11-01 NOTE — PROGRESS NOTES
Juan Bustillos am scribing for and in the presence of Yovany Carrasco MD, F.A.C.C..      Patient: Kat Ashford  : 1935  Date of Visit: 2021    REASON FOR VISIT / CONSULTATION: 6 Month Follow-Up (HX:TIA, dizzy, ASHD, HTN,HLD Pt is here for 6 month follow up he states he is doing well he did have a fall last week while working in yard hurt nose he is unsure what caused fall. He has monthly chest pain feels like elephant on chest takes Nitro and goes away nothing he knows brings it on. Lightheaded/dizziness due to vertigo. denies:SOB,palp )      History of Present Illness:        Dear GINETTE Holloway - CNP    I had the pleasure of seeing Kat Ashford in my office today. Mr. Ramon Mota is a 80 y.o. male who presented for follow-up. He initially presented with a chest tightness. Subsequent cardiac catheterizations showed two-vessel coronary artery disease. Patient status post PCI to LAD and RCA 3/28/2019. He denies any prior history of myocardial infarction or heart failure. No significant heart rhythm problem. He does have a long history of high cholesterol. He is a past smoker and has quit over 40 years ago. His mother had heart problems unsure about fathers history. S/P Heart Cath done on 3/28/019, S/P PCI to mid LAD and mid RCA. ECG 20 showed sinus rhythm with APCs and long first-degree AV block. No acute ischemic changes. Holter monitor worn on 20: No heart rate or rhythm abnormalities that can clearly explain his dizziness. Normal sinus rhythm with rare PAC's and PVC's with 1 PVC triplet. Carotid ultrasound on 2020 showed mild bilateral internal carotid artery stenosis. Bilateral vertebral arteries were patent with antegrade flow. TIA on 2021  MRI done on 2021 showed chronic microvascular disease without acute intracranial abnormality. Mr. Ramon Mota is here today for a follow up.  He states he is having monthly chest heaviness feels like elephant sitting on chest. No rhyme or reason for the chest pain but he reported that the chest pain improves with sublingual nitroglycerin. Is not very active physically, the other day he was working in the yard and he fell, likely lost consciousness because he cannot recall details of his fall. He reported no chest pain or palpitation prior to the episode. He managed to stand up and go inside the house. He is taking care of his wife who is having progressive vision loss and this is very stressful to him. He does have a history of vertigo. He says he sits up, counts to 10, stands up counts to 10 and before he walks he counts to 10. He said his last falling episode was not caused by vertigo. He continues to complain of chronic cough which she attributes it to sinus drainage. No fever or chills. No stomach pain, nausea or vomiting. He gets occasional constipation and sometimes takes stool softener to move his bowel. His weight is a stable. He was asking to reduce some of his medications and I told him he is on the minimum necessary medications that we can use. He has history of statin intolerance and only on Zetia and of course his LDL is not controlled. He is on aspirin Plavix because of CAD and history of TIA in addition to has metoprolol and amlodipine as antianginal therapy, both are low doses. I will send a refill of his Nitrostat to use. I'll get an echo and repeat cardiac catheterization because the chest pain he is describing is ischemic in etiology and patient is intolerant to further antianginal therapy.       PAST MEDICAL HISTORY:        Past Medical History:   Diagnosis Date    Abnormal stress test 2017    Arrhythmia     ASHD (arteriosclerotic heart disease)     CKD (chronic kidney disease)     Closed traumatic fracture of right femur with routine healing 1955    trauma while in [employer], plate and scews placed and removed    COPD (chronic obstructive pulmonary disease) ([facility] [address]     Elevated PSA     Enlarged prostate     Gallstones     Gout     History of kidney stones     Hydrocele in adult     Hyperlipidemia     Hypertension     Renal stones     Trigger finger     Vertigo     Vertigo     Wrist fracture, left        CURRENT ALLERGIES: Penicillins REVIEW OF SYSTEMS: 14 systems were reviewed. Pertinent positives and negatives as above, all else negative. Past Surgical History:   Procedure Laterality Date    APPENDECTOMY      20 yo    CARDIAC CATHETERIZATION Left 2019    Right [facility]/ : OMAR to mid RCA and mid LAD weith Dr. Arnol Segura    plate and screws s/p trauma  and taken out in [address] [place] [address]  2012    LITHOTRIPSY      PROSTATE BIOPSY  10/22/1998, 2004    TOTAL HIP ARTHROPLASTY Right     Social History:  Social History     Tobacco Use    Smoking status: Former Smoker     Packs/day: 1.00     Years: 25.00     Pack years: 25.00     Quit date: 1974     Years since quittin.8    Smokeless tobacco: Never Used   Vaping Use    Vaping Use: Never used   Substance Use Topics    Alcohol use: No     Comment: quit 38 years ago.     Drug use: No        CURRENT MEDICATIONS:        Outpatient Medications Marked as Taking for the 21 encounter (Office Visit) with Skyler Alan MD   Medication Sig Dispense Refill    ezetimibe (ZETIA) 10 MG tablet TAKE ONE TABLET BY MOUTH DAILY 90 tablet 3    amLODIPine (NORVASC) 2.5 MG tablet TAKE ONE TABLET BY MOUTH DAILY 90 tablet 3    Handicap Placard MISC by Does not apply route EXPIRATION DATE: 3 YEARS 1 each 0    nitroGLYCERIN (NITROSTAT) 0.4 MG SL tablet Place 1 tablet under the tongue every 5 minutes as needed for Chest pain 25 tablet 2    metoprolol succinate (TOPROL XL) 25 MG extended release tablet Take 0.5 tablets by mouth daily (Patient taking differently: Take 25 mg by mouth daily ) 30 tablet 3    clopidogrel (PLAVIX) 75 MG tablet take 1 tablet by mouth once daily (Patient taking differently: Take 75 mg by mouth daily ) 90 tablet 3    zinc gluconate 50 MG tablet Take 50 mg by mouth daily      Omega-3 Fatty Acids (FISH OIL) 1000 MG CAPS Take 3,000 mg by mouth daily       aspirin 81 MG EC tablet Take 81 mg by mouth daily. FAMILY HISTORY: family history includes Arrhythmia in his sister; Heart Attack in his father and mother; Heart Disease in his father and mother; Hypertension in his mother; Kidney Disease in his brother; No Known Problems in his brother; Parkinsonism in his father. Physical Examination:      Resp 18   Ht 5' 8\" (1.727 m)   Wt 169 lb 6.4 oz (76.8 kg)   BMI 25.76 kg/m²  Body mass index is 25.76 kg/m². Constitutional: He is oriented to person. He appears well-developed and well-nourished. In no acute distress. HEENT: Normocephalic and atraumatic. No JVD present. Carotid bruit is not present. No mass and no thyromegaly present. No lymphadenopathy present. Cardiovascular: Normal rate, regular rhythm, normal heart sounds. Exam reveals no gallop and no friction rubs. No murmur was heard. .  Pulmonary/Chest: Effort normal and breath sounds normal. No respiratory distress. He has no wheezes, rhonchi or rales. Abdominal: Soft, non-tender. Bowel sounds and aorta are normal. He exhibits no organomegaly, mass or bruit. Extremities: No edema. No cyanosis or clubbing. 2+ radial and carotid pulses. Distal extremity pulses: 2+ bilaterally. Neurological: He is alert and oriented to person. No evidence of gross cranial nerve deficit. Coordination appeared normal.   Skin: Skin is warm and dry. There is no rash or diaphoresis. Psychiatric: He has a normal mood and affect.  His speech is normal and behavior is normal.      MOST RECENT LABS ON RECORD:   Lab Results   Component Value Date    WBC 7.4 10/19/2021    HGB 13.5 10/19/2021    HCT 41.6 10/19/2021     10/19/2021    CHOL 185 10/19/2021    TRIG 111 10/19/2021    HDL 36 (L) 10/19/2021    ALT 9 10/19/2021    AST 19 10/19/2021     10/19/2021    K 4.4 10/19/2021     10/19/2021    CREATININE 1.43 (H) 10/19/2021    BUN 21 10/19/2021    CO2 26 10/19/2021    TSH 3.10 04/30/2021    PSA 9.27 (H) 07/18/2012    INR 1.1 04/08/2021    LABA1C 5.8 10/19/2021     (H) 07/07/2020       ASSESSMENT:     1. Chest pain, unspecified type    2. TIA (transient ischemic attack)    3. Dizziness and giddiness    4. Coronary artery disease involving native coronary artery of native heart without angina pectoris    5. Essential hypertension    6. Mixed hyperlipidemia       PLAN:      Ischemic chest pain suggestive of chronic stable angina, uncontrolled by current antianginal therapy. History of coronary artery stenting back in 2019 for similar symptoms. Antiplatelet Agent: Continue aspirin 81 mg daily and clopidogrel (Plavix 75 mg daily. I also reminded him to watch for signs of blood in his stool or black tarry stools and stop the medication immediately if this develops as this could be life threatening. Beta Blocker Therapy: Continue metoprolol succinate (Toprol XL)  12.5 mg daily I discussed the potential side effects of this medication including lightheadedness and dizziness and told him to call the office if this occurs. Calcium Channel Blocker: Continue amlodipine (Norvasc) 2.5 mg daily. Other Anti-anginal medications: ContinueNitroglycerin as needed. Statin Therapy: History of intolerance to statin therapy. Currently on Zetia 10 mg daily. LDL is uncontrolled. Additional Testing: Giving relatively atypical chest pain and history of coronary artery disease I think we should proceed with cardiac catheterization. Patient reported that the symptoms he has right now is similar to the symptoms he has prior to his coronary stent back in 2019.  He also has unexplained recent syncopal episode, I would like to exclude significant CAD first and then we will work him up with a Cam monitor. Heart catheterization with coronary angiography: For these reasons, I recommended that Mr. Sharif Cartwright consider undergoing a cardiac catheterization with coronary angiography to assess his coronary anatomy and facilitate better treatment recommendations. I discussed the risks, benefits, and alternatives to the procedure including the risk of bleeding, contrast induced allergy and/or kidney damage, arrythmia, stroke, heart attack, death, or the need for further procedures. I also discussed the fact that although treatment with simple medical management is a potential treatment option in place of cardiac catheterization, I expressed my opinion that cardiac catheterization in order to define his coronary anatomy and rule out severe 3 vessel or left main coronary artery disease would significant help guide the most appropriate treatment strategy ranging from no treatment to medications, to stents, to even bypass surgery. Mr. Sharif Cartwright verbalized understanding of the risks benefits and alternatives and stated that he would like to proceed with heart catheterization. I also discussed the advantages and disadvantages of having his procedure performed here at [facility] vs. a larger hospital such as [facility]. Beyond the obvious advantage of convenience, I also explained potential disadvantages including the inability to have immediate cardiac stenting performed or the presence of on site CT surgical backup. However, because of the lack of significant high risk feature on his stress test, I did tell him I thought that in his particular case, it would likely be safe to perform the procedure here. Therefore, after considering the options, Mr. Sharif Cartwright said he would prefer to have the procedure performed here at [facility] and so I scheduled the coronary angiography for cardiac cath tomorrow.   Counseling: I advised Mr. Sharif Cartwright to call our office or go to the emergency room if he develops blood pressure at home and call me back in 1 week and we will readjust his medications if needed. · Hyperlipidemia: Mixed: Uncontrolled, history of statin intolerance  · Cholesterol Reduction Therapy: Continue ezetimide (Zetia) 10 mg daily. · I will consider starting him on a PCSK9 inhibitor after the cardiac cath. Finally, I recommended that he continue his other medications and follow up with you as previously scheduled. FOLLOW UP:   I told Mr. Immanuel Langley to call my office if he had any problems, but otherwise I asked him to Return in about 2 weeks (around 11/15/2021). However, I would be happy to see him sooner should the need arise. Sincerely,  Philip Denver, MD, F.A.C.C. [facility]    [address] [place] [address] [address]  Phone: [phone], Fax: [phone]     I believe that the risk of significant morbidity and mortality related to the patient's current medical conditions are: high. >45 minutes were spent during prep work, discussion and exam of the patient, and follow up documentation and all of their questions were answered. The documentation recorded by the scribe, accurately and completely reflects the services I personally performed and the decisions made by me. Philip Denver MD, F.A.C.C.  November 1, 2021

## 2021-11-02 ENCOUNTER — HOSPITAL ENCOUNTER (OUTPATIENT)
Dept: CARDIAC CATH/INVASIVE PROCEDURES | Age: 86
Discharge: HOME | End: 2021-11-02
Attending: FAMILY MEDICINE | Admitting: FAMILY MEDICINE
Payer: MEDICARE

## 2021-11-02 VITALS
SYSTOLIC BLOOD PRESSURE: 173 MMHG | WEIGHT: 165 LBS | OXYGEN SATURATION: 96 % | BODY MASS INDEX: 25.01 KG/M2 | TEMPERATURE: 97 F | DIASTOLIC BLOOD PRESSURE: 70 MMHG | HEIGHT: 68 IN | RESPIRATION RATE: 20 BRPM | HEART RATE: 69 BPM

## 2021-11-02 PROCEDURE — 6360000002 HC RX W HCPCS

## 2021-11-02 PROCEDURE — C1769 GUIDE WIRE: HCPCS

## 2021-11-02 PROCEDURE — C1887 CATHETER, GUIDING: HCPCS

## 2021-11-02 PROCEDURE — 2709999900 HC NON-CHARGEABLE SUPPLY

## 2021-11-02 PROCEDURE — 2500000003 HC RX 250 WO HCPCS

## 2021-11-02 PROCEDURE — 2580000003 HC RX 258: Performed by: FAMILY MEDICINE

## 2021-11-02 PROCEDURE — C1894 INTRO/SHEATH, NON-LASER: HCPCS

## 2021-11-02 PROCEDURE — 6360000004 HC RX CONTRAST MEDICATION: Performed by: FAMILY MEDICINE

## 2021-11-02 PROCEDURE — 93458 L HRT ARTERY/VENTRICLE ANGIO: CPT | Performed by: FAMILY MEDICINE

## 2021-11-02 RX ORDER — ACETAMINOPHEN 325 MG/1
650 TABLET ORAL EVERY 4 HOURS PRN
Status: DISCONTINUED | OUTPATIENT
Start: 2021-11-02 | End: 2021-11-02 | Stop reason: HOSPADM

## 2021-11-02 RX ORDER — SODIUM CHLORIDE 0.9 % (FLUSH) 0.9 %
5-40 SYRINGE (ML) INJECTION PRN
Status: DISCONTINUED | OUTPATIENT
Start: 2021-11-02 | End: 2021-11-02 | Stop reason: HOSPADM

## 2021-11-02 RX ORDER — SODIUM CHLORIDE 9 MG/ML
INJECTION, SOLUTION INTRAVENOUS CONTINUOUS
Status: DISCONTINUED | OUTPATIENT
Start: 2021-11-02 | End: 2021-11-02 | Stop reason: HOSPADM

## 2021-11-02 RX ORDER — NITROGLYCERIN 0.4 MG/1
0.4 TABLET SUBLINGUAL EVERY 5 MIN PRN
Status: DISCONTINUED | OUTPATIENT
Start: 2021-11-02 | End: 2021-11-02 | Stop reason: HOSPADM

## 2021-11-02 RX ORDER — DIPHENHYDRAMINE HCL 25 MG
50 CAPSULE ORAL ONCE
Status: DISCONTINUED | OUTPATIENT
Start: 2021-11-02 | End: 2021-11-02 | Stop reason: HOSPADM

## 2021-11-02 RX ORDER — SODIUM CHLORIDE 0.9 % (FLUSH) 0.9 %
5-40 SYRINGE (ML) INJECTION EVERY 12 HOURS SCHEDULED
Status: DISCONTINUED | OUTPATIENT
Start: 2021-11-02 | End: 2021-11-02 | Stop reason: HOSPADM

## 2021-11-02 RX ORDER — SODIUM CHLORIDE 9 MG/ML
25 INJECTION, SOLUTION INTRAVENOUS PRN
Status: DISCONTINUED | OUTPATIENT
Start: 2021-11-02 | End: 2021-11-02 | Stop reason: HOSPADM

## 2021-11-02 RX ADMIN — IOPAMIDOL 45 ML: 755 INJECTION, SOLUTION INTRAVENOUS at 13:19

## 2021-11-02 RX ADMIN — SODIUM CHLORIDE: 9 INJECTION, SOLUTION INTRAVENOUS at 11:30

## 2021-11-02 NOTE — OP NOTE
-  Coronary Angiography Brief Post Operative Note:    Severe single vessel coronary artery disease involving a 99% stenosis in a small to moderate sized PL branch of the RCA with at least a 60-70% stenosis in the PDA branch of the right coronary artery and 60-70% stenosis in the D2 branch of the LAD. Normal left ventricular end diastolic pressure. Consult to interventional cardiology for consideration of angioplasty and/or stenting of the patients severe stenosis.      Electronically signed by Cristo Best MD on 11/2/2021 at 1:14 PM

## 2021-11-02 NOTE — PROGRESS NOTES
Inpatients must meet criteria 1 through 7.   1. Minimum 30 minutes after last dose of sedative medication, minimum 120 minutes after last dose of reversal agent. Yes   2. Systolic BP stable within 20 mmHg for 30 minutes & systolic BP between 90 & 999 or within 10 mmHg of baseline. Yes   3. Pulse between 60 and 100 or within 10 bpm of baseline. Yes   4. Spontaneous respiratory rate >/= 10 per minute. Yes   5. SaO2 >/= 95 or >/= baseline. Yes   6. Able to cough and swallow or return to baseline function. Yes   7. Alert and oriented or return to baseline mental status. Yes   8. Demonstrates controlled, coordinated movements, ambulates with steady gait, or return to baseline activity function. Yes   9. Minimal or no pain or nausea, or at a level tolerable and acceptable to patient. Yes   10. Takes and retains oral fluids as allowed. Yes   11. Procedural / perioperative site stable. Minimal or no bleeding. Yes   12. If GI endoscopy procedure, minimal or no abdominal distention or passing flatus. N/A   13. Written discharge instructions and emergency telephone number provided. Yes   14. Accompanied by a responsible adult. Yes   Adult patient discharged from facility without responsible person meets above criteria plus the following:   a) remains awake without stimulus for 30 minutes   b) oriented appropriate for age   c) all vital signs stable   d) no significant risk of losing protective reflexes   e) able to maintain pre-procedure mobility without assistance   f) no nausea or dizziness   g) transportation arrangements that do not require patient to operate motor Vehicle.    N/A

## 2021-11-02 NOTE — PROGRESS NOTES
Patient returned to pre/post area. Alert and oriented, denies pain. Right radial palpable distal to puncture site. Pressure dressing in place no redness/swelling/bleeding noetd. Will continue to monitor.

## 2021-11-03 RX ORDER — METOPROLOL SUCCINATE 25 MG/1
12.5 TABLET, EXTENDED RELEASE ORAL DAILY
Qty: 90 TABLET | Refills: 3 | Status: SHIPPED | OUTPATIENT
Start: 2021-11-03

## 2021-11-08 ENCOUNTER — HOSPITAL ENCOUNTER (OUTPATIENT)
Dept: CARDIAC CATH/INVASIVE PROCEDURES | Age: 86
Discharge: HOME | End: 2021-11-09
Attending: INTERNAL MEDICINE | Admitting: INTERNAL MEDICINE
Payer: MEDICARE

## 2021-11-08 DIAGNOSIS — Z98.890 S/P CARDIAC CATHETERIZATION: Primary | ICD-10-CM

## 2021-11-08 LAB
ACTIVATED CLOTTING TIME: 280 SEC (ref 79–149)
TROPONIN INTERP: ABNORMAL
TROPONIN INTERP: ABNORMAL
TROPONIN T: ABNORMAL NG/ML
TROPONIN T: ABNORMAL NG/ML
TROPONIN, HIGH SENSITIVITY: 42 NG/L (ref 0–22)
TROPONIN, HIGH SENSITIVITY: 46 NG/L (ref 0–22)

## 2021-11-08 PROCEDURE — C1725 CATH, TRANSLUMIN NON-LASER: HCPCS

## 2021-11-08 PROCEDURE — 36415 COLL VENOUS BLD VENIPUNCTURE: CPT

## 2021-11-08 PROCEDURE — 6360000004 HC RX CONTRAST MEDICATION

## 2021-11-08 PROCEDURE — C1887 CATHETER, GUIDING: HCPCS

## 2021-11-08 PROCEDURE — C1874 STENT, COATED/COV W/DEL SYS: HCPCS

## 2021-11-08 PROCEDURE — 2709999900 HC NON-CHARGEABLE SUPPLY

## 2021-11-08 PROCEDURE — 6360000002 HC RX W HCPCS

## 2021-11-08 PROCEDURE — 6370000000 HC RX 637 (ALT 250 FOR IP)

## 2021-11-08 PROCEDURE — 84484 ASSAY OF TROPONIN QUANT: CPT

## 2021-11-08 PROCEDURE — C9600 PERC DRUG-EL COR STENT SING: HCPCS | Performed by: INTERNAL MEDICINE

## 2021-11-08 PROCEDURE — 2500000003 HC RX 250 WO HCPCS

## 2021-11-08 PROCEDURE — C1769 GUIDE WIRE: HCPCS

## 2021-11-08 PROCEDURE — 7100000000 HC PACU RECOVERY - FIRST 15 MIN

## 2021-11-08 PROCEDURE — 85347 COAGULATION TIME ACTIVATED: CPT

## 2021-11-08 PROCEDURE — 7100000001 HC PACU RECOVERY - ADDTL 15 MIN

## 2021-11-08 PROCEDURE — C1760 CLOSURE DEV, VASC: HCPCS

## 2021-11-08 PROCEDURE — C1894 INTRO/SHEATH, NON-LASER: HCPCS

## 2021-11-08 RX ORDER — SODIUM CHLORIDE 9 MG/ML
INJECTION, SOLUTION INTRAVENOUS CONTINUOUS
Status: DISCONTINUED | OUTPATIENT
Start: 2021-11-08 | End: 2021-11-09 | Stop reason: HOSPADM

## 2021-11-08 RX ORDER — METOPROLOL SUCCINATE 25 MG/1
12.5 TABLET, EXTENDED RELEASE ORAL DAILY
Status: DISCONTINUED | OUTPATIENT
Start: 2021-11-09 | End: 2021-11-09 | Stop reason: HOSPADM

## 2021-11-08 RX ORDER — ASPIRIN 81 MG/1
81 TABLET, CHEWABLE ORAL DAILY
Status: DISCONTINUED | OUTPATIENT
Start: 2021-11-08 | End: 2021-11-09 | Stop reason: HOSPADM

## 2021-11-08 RX ORDER — POLYETHYLENE GLYCOL 3350 17 G/17G
17 POWDER, FOR SOLUTION ORAL DAILY PRN
Status: DISCONTINUED | OUTPATIENT
Start: 2021-11-08 | End: 2021-11-09 | Stop reason: HOSPADM

## 2021-11-08 RX ORDER — CLOPIDOGREL BISULFATE 75 MG/1
75 TABLET ORAL DAILY
Status: DISCONTINUED | OUTPATIENT
Start: 2021-11-09 | End: 2021-11-09 | Stop reason: HOSPADM

## 2021-11-08 RX ORDER — ASPIRIN 81 MG/1
81 TABLET ORAL DAILY
Status: DISCONTINUED | OUTPATIENT
Start: 2021-11-09 | End: 2021-11-09 | Stop reason: HOSPADM

## 2021-11-08 RX ORDER — EZETIMIBE 10 MG/1
10 TABLET ORAL DAILY
Status: DISCONTINUED | OUTPATIENT
Start: 2021-11-09 | End: 2021-11-09 | Stop reason: HOSPADM

## 2021-11-08 RX ORDER — ONDANSETRON 2 MG/ML
4 INJECTION INTRAMUSCULAR; INTRAVENOUS EVERY 6 HOURS PRN
Status: DISCONTINUED | OUTPATIENT
Start: 2021-11-08 | End: 2021-11-09 | Stop reason: HOSPADM

## 2021-11-08 RX ORDER — ONDANSETRON 4 MG/1
4 TABLET, ORALLY DISINTEGRATING ORAL EVERY 8 HOURS PRN
Status: DISCONTINUED | OUTPATIENT
Start: 2021-11-08 | End: 2021-11-09 | Stop reason: HOSPADM

## 2021-11-08 RX ADMIN — SODIUM CHLORIDE: 9 INJECTION, SOLUTION INTRAVENOUS at 12:35

## 2021-11-08 ASSESSMENT — PAIN SCALES - GENERAL: PAINLEVEL_OUTOF10: 0

## 2021-11-08 NOTE — CARE COORDINATION
Case Management Initial Discharge Plan  Jerman Ahumada,             Met with:patient to discuss discharge plans. Information verified: address, contacts, phone number, , insurance Yes  Insurance Provider: Medicare    Emergency Contact/Next of Kin name & number: wife [phone]  Who are involved in patient's support system? wifeand daughter    PCP: GINETTE Arreola CNP  Date of last visit: Oct 2021      Discharge Planning    Living Arrangements:  Spouse/Significant Other     Home has one story  2 stairs to climb to get into front door    Patient able to perform ADL's:Independent    Current Services (outpatient & in home) none  DME equipment: cane  DME provider: n/a    Is patient receiving oral anticoagulation therapy? No          Potential Assistance Needed:  N/A    Patient agreeable to home care: No  [place] of choice provided:  n/a    Prior SNF/Rehab Placement and Facility:N/A  Agreeable to SNF/Rehab: No  [place] of choice provided: n/a     Evaluation: n/a    Expected Discharge date:  21    Patient expects to be discharged to:   home    If home: is the family and/or caregiver wiling & able to provide support at home? yes  Who will be providing this support? wife*    Follow Up Appointment: Best Day/ Time:      Transportation provider: daughter  Transportation arrangements needed for discharge: No    Readmission Risk              Risk of Unplanned Readmission:  0             Does patient have a readmission risk score greater than 14?: No  If yes, follow-up appointment must be made within 7 days of discharge.      Goals of Care: no chest pain      Educated patient on transitional options, provided freedom of choice and are agreeable with plan      Discharge Plan: home with wife    [facility]          Electronically signed by Luz Maria Betancourt RN on 21 at 5:21 PM EST

## 2021-11-08 NOTE — OP NOTE
[facility]    CARDIAC CATHETERIZATION    Date:   11/8/2021  Patient name:  Vipul Gasca  Date of admission:  No admission date for patient encounter. MRN:   [id number]  YOB: 1935    Operators:  Primary:   Gail Mccoy MD (Attending Physician)        Procedure performed:     [] Left Heart Catheterization. [] Graft Angiography.  [] Left Ventriculography. [] Right Heart Catheterization. [] Coronary Angiography. [] Aortic Valve Studies. [x] PCI:      [] Other:       Pre Procedure Conscious Sedation Data:  ASA Class:    [] I [x] II [] III [] IV    Mallampati Class:  [] I [x] II [] III [] IV      Indication:  [] STEMI      [] + Stress test  [] ACS      [] + EKG Changes  [] Non Q MI       [] Significant Risk Factors  [x] Recurrent Angina             [] Diabetes Mellitus    [] New LBBB      [] Uncontrolled HTN. [] CHF / Low EF changes     [] Abnormal CTA / Ca Score      Procedure:  Access:  [x] Femoral  [] Radial  artery       [x] Right  [] Left    Procedure: After informed consent was obtained with explanation of the risks and benefits, patient was brought to the cath lab. The access area was prepped and draped in sterile fashion. 1% lidocaine was used for local block. The artery was cannulated with 6  Fr sheath with brisk arterial blood return. The side port was frequently flushed and aspirated with normal saline. Findings:    Cardiac Arteries and Lesion Findings       LAD:         Lesion on Mid LAD: Distal subsection. 85% stenosis 6 mm length reduced to     0%. Pre procedure ASHLEY III flow was noted. Post Procedure ASHLEY III flow     was present. Good runoff was present. The lesion was diagnosed as     Moderate Risk (B). Devices used         - Trot Prowater 190 cm. Number of passes: 1.         - Resolute Onesimo 2.5 x 18 OMAR. 1 inflation(s) to a max pressure of: 10     lb. RCA:         Lesion on 1st RPL: Mid subsection. 99% stenosis 8 mm length reduced to 0%. Pre procedure ASHLEY II flow was noted. Post Procedure ASHLEY III flow was     present. Good runoff was present. The lesion was diagnosed as High Risk     (C). Devices used         - Earnix Prowater 190 cm. Number of passes: 1.         - Euphora Balloon 2.0mm x 12mm. 2 inflation(s) to a max pressure of: 12     lb. - Resolute [place] 2.25 x 15 OMAR. 2 inflation(s) to a max pressure of: 10     lb. Coronary Tree        Dominance: Right         Conclusions        Procedure Summary        Successful PTCA -OMAR mid LAD (Distal segment)    Successful PTCA - OMAR PL branch / RCA        Recommendations        Post stent protocol. Continue with ASA and plavix          Estimated Blood Loss:            [x] Minimal 10-25 cc   [] Minimal < 25 cc      [] Moderate 25-50 cc         [] >50 cc        ____________________________________________________________________    History and Risk Factors    [x] Hypertension     [] Family history of CAD  [x] Hyperlipidemia     [] Cerebrovascular Disease   [] Prior MI       [] Peripheral Vascular disease   [x] Prior PCI              [] Diabetes Mellitus    [] Left Main PCI. [] Currently on Dialysis. [] Prior CABG. [] Currently smoker. [] Cardiac Arrest outside of healthcare facility. [] Yes    [x] No        Witnessed     [] Yes   [] No     Arrest after arrival of EMS  [] Yes   [] No     [] Cardiac Arrest at other Facility. [] Yes   [x] No    Pre-Procedure Information. Heart Failure       [] Yes    [x] No        Class  [] I      [] II  [] III    [] IV. New Diagnosis    [] Yes  [] No    HF Type      [] Systolic   [] Diastolic          [] Unknown. Diagnostic Test:   EKG       [] Normal   [] Abnormal    New antiarrhythmia medications:    [] Yes   [] No   New onset atrial fibrillation / Flutter     [] Yes   [] No   ECG Abnormalities:      [] V. Fib   [] Michelle V. Tach           [] NS V. T   [] New LBBB           [] T.  Inv  []  ST dev > 0.5 mm         [] PVC's freq  [] PVC's infrequent    Stress Test Performed:      [] Yes    [x] No     Type:     [] Stress Echo   [] Exercise Stress Test (no imaging)      [] Stress Nuclear  [] Stress Imaging     Results   [] Negative   [] Positive        [] Indeterminate  [] Unavailable     If Positive/ Risk / Extent of Ischemia:       [] Low  [] Intermediate         [] High  [] Unavailable      Cardiac CTA Performed:     [] Yes    [x] No      Results   [] CAD   [] Non obstructive CAD      [] No CAD   [] Uncertain      [] Unknown   [] Structural Disease. Pre Procedure Medications:   [] Yes    [x] No         [] ASA   [] Beta Blockers      [] Nitrate   [] Ca Channel Blockers      [] Ranolazine   [] Statin       [] Plavix/Others antiplatelets      Electronically signed on 11/8/2021 at 3:18 PM by:    Vincent Escobar MD  Fellow, [address]    I have reviewed the case / procedure with resident / fellow  I have examined the patient personally  Patient agree with treatment plan as discussed before, final arrangement based on my evaluation and exam.    Risk and benefit of procedure planned were explained in details. Procedure was performed by me personally, with all aspect of the procedure being done using standard protocol. Note was modified based on my own assessment and treatment.     Chip Quinonez MD  [facility]

## 2021-11-08 NOTE — H&P
[facility]  Pre- Procedure History and Physical/Update          Patient Name:  Genoveva Li  MRN:    [id number]  YOB: 1935  Date of evaluation:  11/8/2021       Please refer to the consult note / H&P completed by Dr. Taras Goldmann on 11/1/21 in the medical record and note that:       [] I have examined the patient and reviewed the H&P/Consult and there are no changes to be made to the assessment or plan. [x] I have examined the patient and reviewed the H&P/Consult and have noted the following changes:        Past Medical History:   Diagnosis Date    Abnormal stress test 2017    Arrhythmia     ASHD (arteriosclerotic heart disease)     Cerebral artery occlusion with cerebral infarction ([address]     TIA    CKD (chronic kidney disease)     Closed traumatic fracture of right femur with routine healing 1955    trauma while in [employer], plate and scews placed and removed    COPD (chronic obstructive pulmonary disease) [facility]     Elevated PSA     Enlarged prostate     Gallstones     Gout     History of kidney stones     Hx of blood clots     Hx of cardiac cath 11/02/2021    [facility]) Sh[facility]/Dr. Posadas/Right Radial     Hydrocele in adult     Hyperlipidemia     Hypertension     Renal stones     Trigger finger     Vertigo     Vertigo     Wrist fracture, left        Past Surgical History:   Procedure Laterality Date    APPENDECTOMY      22 yo    CARDIAC CATHETERIZATION Left 03/28/2019    Right Radial/[facility]/ : OMAR [facility] mid RCA and [facility]    plate and screws s/p trauma  and taken out in [address] [place] [address] Drive  9/12/2012    JOINT REPLACEMENT      LITHOTRIPSY      PROSTATE BIOPSY  10/22/1998, 8/26/2004    TOTAL HIP ARTHROPLASTY Right 1999        reports that he quit smoking about 47 years ago. He has a 25.00 pack-year smoking history.  He has never used smokeless tobacco. He reports that he does not drink alcohol and does not use drugs. Prior to Admission medications    Medication Sig Start Date End Date Taking? Authorizing Provider   metoprolol succinate (TOPROL XL) 25 MG extended release tablet Take 0.5 tablets by mouth daily 11/3/21   Ignacio Zamora MD   nitroGLYCERIN (NITROSTAT) 0.4 MG SL tablet Place 1 tablet under the tongue every 5 minutes as needed for Chest pain 11/1/21   Ignacio Zamora MD   ezetimibe (ZETIA) 10 MG tablet TAKE ONE TABLET BY MOUTH DAILY 8/3/21   Ignacio Zamora MD   amLODIPine (NORVASC) 2.5 MG tablet TAKE ONE TABLET BY MOUTH DAILY 6/17/21   Ignacio Zamora MD   Handicap Placard MISC by Does not apply route EXPIRATION DATE: 3 YEARS 5/20/21   Zay Mann APRN - CNP   clopidogrel (PLAVIX) 75 MG tablet take 1 tablet by mouth once daily  Patient taking differently: Take 75 mg by mouth daily  9/8/20   Ignacio Zamora MD   zinc gluconate 50 MG tablet Take 50 mg by mouth daily    Historical Provider, MD   Omega-3 Fatty Acids (FISH OIL) 1000 MG CAPS Take 3,000 mg by mouth daily     Historical Provider, MD   aspirin 81 MG EC tablet Take 81 mg by mouth daily. Historical Provider, MD       Allergies   Allergen Reactions    Penicillins Swelling and Rash         REVIEW OF SYSTEMS:     A detailed review of system was performed as already noted and is otherwise as above. PHYSICAL EXAM:     There were no vitals filed for this visit. Constitutional and General Appearance: Alert. Not in acute stress. Respiratory:  · Clear to auscultation b/l. No wheeze or crackles. Cardiovascular:  · Regular rate and rhythm. No murmurs. · Jugular venous pulsation is normal  Abdomen:  · No masses or tenderness  Extremities:  · Lower extremity edema - No  · Skin: Warm and dry  Neurological:  · Alert and oriented. · Moves all extremities well      Active Problems:    * No active hospital problems.  *  Resolved Problems:    * No resolved hospital problems. *         ECG 7/7/20 showed sinus rhythm with APCs and long first-degree AV block. No acute ischemic changes.     Holter monitor worn on 7/7/20: No heart rate or rhythm abnormalities that can clearly explain his dizziness. Normal sinus rhythm with rare PAC's and PVC's with 1 PVC triplet.      Carotid ultrasound on 7/14/2020 showed mild bilateral internal carotid artery stenosis. Bilateral vertebral arteries were patent with antegrade flow.     TIA on 4/8/2021  MRI done on 4/9/2021 showed chronic microvascular disease without acute intracranial abnormality. S/P Heart Cath done on 3/28/019, S/P PCI to mid LAD and mid RCA. Cath 11/2/21 with Dr. Bonni Lefort:  Severe single vessel coronary artery disease involving a 99% stenosis in a small to moderate sized PL branch of the RCA with at least a 60-70% stenosis in the PDA branch of the right coronary artery and 60-70% stenosis in the D2 branch of the LAD. Normal left ventricular end diastolic pressure. Consult to interventional cardiology for consideration of angioplasty and/or stenting of the patients severe stenosis. Assessment:  1. Chest pain s/p cardiac cath with obstructive disease in PL, PDA and D2 as above. Previous stents in mid LAD and mid RCA in 2019  2. TIA  3. HTN  4. HLD      Plan:  1. Proceed with planned cardiac cath and PCI. 2. Further orders to follow. Pre Procedure Conscious Sedation Data:  ASA Class:                  [] I [x] II [] III [] IV     Mallampati Class:       [] I [x] II [] III [] IV      The risks, benefits, and alternatives of the prcoedure were discussed in detail with the patient. The patient agrees to proceed with the procedure, verbalizes understanding and signed consent.        Jad De Los Santos MD, MD  Fellow, [address] [facility]

## 2021-11-08 NOTE — PROGRESS NOTES
1720: hematoma noted, manual pressure held, continuous oozing present, cardiology notified, Femostop reapplied  1815: Per pt unable to urinate all day, primary notified, writer instructed to bladder scan

## 2021-11-08 NOTE — PROGRESS NOTES
Report called to [facility], patient transported to room 2014. Right groin soft, with small amount of oozing. New bandaid applied.

## 2021-11-08 NOTE — PROGRESS NOTES
Patient admitted, consent signed and questions answered. Patient ready for procedure. Call light to reach with side rails up 2 of 2. Bilateral groin areas clipped. Wife Wendie Thomas at bedside with patient. History and physical needs updated.

## 2021-11-08 NOTE — LETTER
[facility] [address]  [id number] [address]  Phone: [phone]    [facility] CATH [facility] RM A        November 9, 2021     Patient: Sabrina Navarro   YOB: 1935   Date of Visit: 11/8/2021       To Whom It May Concern: It is my medical opinion that Carina Rasmussen {Work release (duty restriction):22063}. If you have any questions or concerns, please don't hesitate to call.     Sincerely,        [facility] CATH [facility] RACHAEL A

## 2021-11-09 VITALS
OXYGEN SATURATION: 98 % | HEIGHT: 68 IN | BODY MASS INDEX: 25.01 KG/M2 | SYSTOLIC BLOOD PRESSURE: 157 MMHG | HEART RATE: 70 BPM | RESPIRATION RATE: 17 BRPM | DIASTOLIC BLOOD PRESSURE: 86 MMHG | TEMPERATURE: 98.4 F | WEIGHT: 165 LBS

## 2021-11-09 LAB
ABSOLUTE EOS #: 0.24 K/UL (ref 0–0.44)
ABSOLUTE IMMATURE GRANULOCYTE: 0.05 K/UL (ref 0–0.3)
ABSOLUTE LYMPH #: 1.71 K/UL (ref 1.1–3.7)
ABSOLUTE MONO #: 0.69 K/UL (ref 0.1–1.2)
ANION GAP SERPL CALCULATED.3IONS-SCNC: 15 MMOL/L (ref 9–17)
BASOPHILS # BLD: 1 % (ref 0–2)
BASOPHILS ABSOLUTE: 0.06 K/UL (ref 0–0.2)
BUN BLDV-MCNC: 21 MG/DL (ref 8–23)
BUN/CREAT BLD: ABNORMAL (ref 9–20)
CALCIUM SERPL-MCNC: 9.4 MG/DL (ref 8.6–10.4)
CHLORIDE BLD-SCNC: 107 MMOL/L (ref 98–107)
CO2: 18 MMOL/L (ref 20–31)
CREAT SERPL-MCNC: 1.26 MG/DL (ref 0.7–1.2)
DIFFERENTIAL TYPE: ABNORMAL
EOSINOPHILS RELATIVE PERCENT: 3 % (ref 1–4)
GFR AFRICAN AMERICAN: >60 ML/MIN
GFR NON-AFRICAN AMERICAN: 54 ML/MIN
GFR SERPL CREATININE-BSD FRML MDRD: ABNORMAL ML/MIN/{1.73_M2}
GFR SERPL CREATININE-BSD FRML MDRD: ABNORMAL ML/MIN/{1.73_M2}
GLUCOSE BLD-MCNC: 77 MG/DL (ref 70–99)
HCT VFR BLD CALC: 40.5 % (ref 40.7–50.3)
HEMOGLOBIN: 12.9 G/DL (ref 13–17)
IMMATURE GRANULOCYTES: 1 %
LYMPHOCYTES # BLD: 21 % (ref 24–43)
MCH RBC QN AUTO: 27.4 PG (ref 25.2–33.5)
MCHC RBC AUTO-ENTMCNC: 31.9 G/DL (ref 28.4–34.8)
MCV RBC AUTO: 86 FL (ref 82.6–102.9)
MONOCYTES # BLD: 8 % (ref 3–12)
NRBC AUTOMATED: 0 PER 100 WBC
PDW BLD-RTO: 13.3 % (ref 11.8–14.4)
PLATELET # BLD: 236 K/UL (ref 138–453)
PLATELET ESTIMATE: ABNORMAL
PMV BLD AUTO: 10.3 FL (ref 8.1–13.5)
POTASSIUM SERPL-SCNC: 4.5 MMOL/L (ref 3.7–5.3)
RBC # BLD: 4.71 M/UL (ref 4.21–5.77)
RBC # BLD: ABNORMAL 10*6/UL
SEG NEUTROPHILS: 66 % (ref 36–65)
SEGMENTED NEUTROPHILS ABSOLUTE COUNT: 5.59 K/UL (ref 1.5–8.1)
SODIUM BLD-SCNC: 140 MMOL/L (ref 135–144)
WBC # BLD: 8.3 K/UL (ref 3.5–11.3)
WBC # BLD: ABNORMAL 10*3/UL

## 2021-11-09 PROCEDURE — 85025 COMPLETE CBC W/AUTO DIFF WBC: CPT

## 2021-11-09 PROCEDURE — 80048 BASIC METABOLIC PNL TOTAL CA: CPT

## 2021-11-09 PROCEDURE — 36415 COLL VENOUS BLD VENIPUNCTURE: CPT

## 2021-11-09 NOTE — DISCHARGE SUMMARY
[facility]  Discharge Note                 Name:  Florencia Griffin  YOB: 1935  Social Security Number:  [id number]  Medical Record Number:  [id number]    Date of Admission:  11/8/2021  Date of Discharge:  11/9/2021    Admitting physician: Angel Burrell MD    Discharge Attending: GINETTE Hitchcock NP, CNP  Primary Care Physician: GINETTE Liang CNP  Consultants: Cardiology  Discharge to Home in stable condition    HOSPITAL ADMISSION PROBLEM LIST:  Patient Active Problem List   Diagnosis    Abnormal stress test    S/P angioplasty with stent    Left-sided weakness    TIA (transient ischemic attack)    Chronic obstructive pulmonary disease, unspecified COPD type ([facility]    S/P cardiac catheterization         Procedures:cardiac catheterization    HOSPITAL COURSE :           The patient was admitted for: Recurrent angina  Hospital Procedures if any: CATH with PCI  Medications changes recommendation: see medication list  Follow Up Plan: 2 weeks with TCC      Discharge exam:   Vitals:    11/08/21 2130   BP: (!) 145/88   Pulse: 67   Resp: 14   Temp:    SpO2:      Neuro: normal  Chest: Clear to ausculation. No wheezing. Cardiac: Regular rate. s1 and s2 auscultated. No murmur noted. Abdomen/groin: soft, non-tender, without masses or organomegaly  Lower extremity edema: none  Right Femoral artery site:  CDI without ecchymosis or hematoma  Soft + pulse. Discharge Medications:  ASA 81 mg daily  Plavix 75 mg daily  Zetia 10 mg daily  Toprol XL 12.5 mg daily  Norvasc 2.5 mg daily  SL NTG PRN       CATH 11/8/2021  Findings:     Cardiac Arteries and Lesion Findings       LAD:         Lesion on Mid LAD: Distal subsection. 85% stenosis 6 mm length reduced to     0%. Pre procedure ASHLEY III flow was noted. Post Procedure ASHLEY III flow     was present. Good runoff was present. The lesion was diagnosed as     Moderate Risk (B).          Devices used         - TreSensa 190 cm. Number of passes: 1.         - Resolute [place] 2.5 x 18 OMAR. 1 inflation(s) to a max pressure of: 10     lb.       RCA:         Lesion on 1st RPL: Mid subsection. 99% stenosis 8 mm length reduced to 0%.     Pre procedure ASHLEY II flow was noted. Post Procedure ASHLEY III flow was     present. Good runoff was present. The lesion was diagnosed as High Risk     (C).       Devices used         - PetroDE [place] 190 cm. Number of passes: 1.         - Euphora Balloon 2.0mm x 12mm. 2 inflation(s) to a max pressure of: 12     lb.         - Resolute Onesimo 2.25 x 15 OMAR. 2 inflation(s) to a max pressure of: 10     lb.        Coronary Tree        Dominance: Right          Conclusions        Procedure Summary        Successful PTCA -OMAR mid LAD (Distal segment)    Successful PTCA - OMAR PL branch / RCA        Recommendations        Post stent protocol. Continue with ASA and plavix    ECHO 11/1/2021  Poor image quality, likely due to patient body habitus and/or lung disease. Global left ventricular function is difficult to assess but appears low  normal with an estimated EF of 50-55%. Moderate left ventricular hypertrophy and with normal left ventricular  cavity size. Unable to assess specific wall motion abnormalities due to image quality. The patient has a sigmoid interventricular septum without evidence of  outflow tract obstruction. No significant valvular abnormalities. Evidence of mild (grade I) diastolic dysfunction is seen. Coronary Discharge Core Measure: Please indicate the medication given by X, and if not the reasons not given:    Not Given Reason  Given      Beta Blockers X     LVEF preserved. ACE-I      On Zetia Statins       ASA X       OAP Plavix X    SL Nitro   X       Patient seen and examined in room after discussion with RN. S/p cath with PCI. Discussed in detail with patient post cath POC including but not limited to medications, diet, exercise, right femoral artery site care, and follow-up. Questions and concerns addressed. OK for discharge home today. F/U with Primary Cardiologist in [facility] in 1-3 weeks. Discussed with patient and nursing. Medications and discharge instructions reviewed with patient and nursing.     Electronically signed by GINETTE Nowak NP on 11/9/2021 at 5:49 [facility]      [phone]

## 2021-11-09 NOTE — PROGRESS NOTES
Attempted straight cath 2x, unsuccessful, primary notified, pt states in past post cardiac cath situations he required continuous bladder irrigation primary aware

## 2021-11-09 NOTE — FLOWSHEET NOTE
IV removed, follow up appointment made, instructions provided, pt left floor via wheelchair with all belongings

## 2021-11-09 NOTE — PROGRESS NOTES
Pt urinating without difficulty. Pt using urinal x2 with 200ml. Pt up to bathroom x 2 for two occurrences. Pt denies discomfort.

## 2021-11-09 NOTE — CARE COORDINATION
Discharge [facility] Case Management Department  Written by: Pushpa Baca RN    Patient Name: Dana Canela  Attending Provider: No att. providers found  Admit Date: 2021  5:08 PM  MRN: [id number]  Account: [de-identified]                     : 1935  Discharge Date: 2021      Disposition: home    Pushpa Baca RN

## 2021-12-07 ENCOUNTER — OFFICE VISIT (OUTPATIENT)
Dept: CARDIOLOGY | Age: 86
End: 2021-12-07
Payer: MEDICARE

## 2021-12-07 VITALS
SYSTOLIC BLOOD PRESSURE: 135 MMHG | HEIGHT: 68 IN | DIASTOLIC BLOOD PRESSURE: 64 MMHG | BODY MASS INDEX: 25.82 KG/M2 | HEART RATE: 67 BPM | OXYGEN SATURATION: 99 % | RESPIRATION RATE: 18 BRPM | WEIGHT: 170.4 LBS

## 2021-12-07 DIAGNOSIS — I25.10 ASHD (ARTERIOSCLEROTIC HEART DISEASE): Primary | ICD-10-CM

## 2021-12-07 DIAGNOSIS — G45.9 TIA (TRANSIENT ISCHEMIC ATTACK): ICD-10-CM

## 2021-12-07 DIAGNOSIS — E78.2 MIXED HYPERLIPIDEMIA: ICD-10-CM

## 2021-12-07 DIAGNOSIS — R42 DIZZINESS AND GIDDINESS: ICD-10-CM

## 2021-12-07 DIAGNOSIS — I10 ESSENTIAL HYPERTENSION: ICD-10-CM

## 2021-12-07 DIAGNOSIS — Z95.820 S/P ANGIOPLASTY WITH STENT: ICD-10-CM

## 2021-12-07 PROCEDURE — G8417 CALC BMI ABV UP PARAM F/U: HCPCS | Performed by: INTERNAL MEDICINE

## 2021-12-07 PROCEDURE — 99211 OFF/OP EST MAY X REQ PHY/QHP: CPT | Performed by: INTERNAL MEDICINE

## 2021-12-07 PROCEDURE — 99214 OFFICE O/P EST MOD 30 MIN: CPT | Performed by: INTERNAL MEDICINE

## 2021-12-07 PROCEDURE — 1123F ACP DISCUSS/DSCN MKR DOCD: CPT | Performed by: INTERNAL MEDICINE

## 2021-12-07 PROCEDURE — G8484 FLU IMMUNIZE NO ADMIN: HCPCS | Performed by: INTERNAL MEDICINE

## 2021-12-07 PROCEDURE — G8427 DOCREV CUR MEDS BY ELIG CLIN: HCPCS | Performed by: INTERNAL MEDICINE

## 2021-12-07 PROCEDURE — 1036F TOBACCO NON-USER: CPT | Performed by: INTERNAL MEDICINE

## 2021-12-07 PROCEDURE — 4040F PNEUMOC VAC/ADMIN/RCVD: CPT | Performed by: INTERNAL MEDICINE

## 2021-12-07 RX ORDER — EVOLOCUMAB 140 MG/ML
140 INJECTION, SOLUTION SUBCUTANEOUS
Qty: 2.1 ML | Refills: 3 | Status: SHIPPED | OUTPATIENT
Start: 2021-12-07 | End: 2022-03-08 | Stop reason: SDUPTHER

## 2021-12-07 NOTE — PATIENT INSTRUCTIONS
SURVEY:    You may be receiving a survey from [employer] regarding your visit today. Please complete the survey to enable us to provide the highest quality of care to you and your family. If you cannot score us a very good on any question, please call the office to discuss how we could have made your experience a very good one. Thank you.

## 2021-12-07 NOTE — PROGRESS NOTES
[facility] am scribing for and in the presence of Anika Root MD, F.A.C.C..    Patient: Sherlyn Dickinson  : 1935  Date of Visit: 2021    REASON FOR VISIT / CONSULTATION: Follow-up (heart cath and stent follow up. feeling better, more strength in his legs. dizziness, said he does have vertigo. Denies: CP, SOB, palps)      History of Present Illness:        Dear Thalia Davis, APRN - CNP    I had the pleasure of seeing Sherlyn Dickinson in my office today. Mr. Graciela Larson is a 80 y.o. male who presented for follow-up. He initially presented with a chest tightness. Subsequent cardiac catheterizations showed two-vessel coronary artery disease. Patient status post PCI to LAD and RCA 3/28/2019. He denies any prior history of myocardial infarction or heart failure. No significant heart rhythm problem. He does have a long history of high cholesterol. He is a past smoker and has quit over 40 years ago. His mother had heart problems unsure about fathers history. S/P Heart Cath done on 3/28/019, S/P PCI to mid LAD and mid RCA. ECG 20 showed sinus rhythm with APCs and long first-degree AV block. No acute ischemic changes. Holter monitor worn on 20: No heart rate or rhythm abnormalities that can clearly explain his dizziness. Normal sinus rhythm with rare PAC's and PVC's with 1 PVC triplet. Carotid ultrasound on 2020 showed mild bilateral internal carotid artery stenosis. Bilateral vertebral arteries were patent with antegrade flow. TIA on 2021- MRI done on 2021 showed chronic microvascular disease without acute intracranial abnormality. Echo done on 2021- estimated EF of 50-55%. Moderate left ventricular hypertrophy and with normal left ventricular cavity size. Unable to assess specific wall motion abnormalities due to image quality. The patient has a sigmoid interventricular septum without evidence of outflow tract obstruction.   No significant valvular abnormalities. Evidence of mild (grade I) diastolic dysfunction is seen    Cardiac cath done on 11/2/2021 showed Severe single vessel disease involving a small to moderate sized PL branch of the RCA with moderate to severe disease in the PDA and D2 branch of the LAD. Normal left ventricular end diastolic pressure    Successful PTCA -OMAR mid LAD (Distal segment), Successful PTCA - OMAR PL branch / RCA by Dr Eyad Rios on 11/8/2021    Mr. Airam Ness is here today for a follow up post stent placement by Dr Eyad Rios on 11/8/2021. He feels like he is regaining strength more everyday. His legs are stronger. He has been riding his wife's exercise bike. He denies any chest pain, pressure or tightness. He has had no shortness of breath. No palpitations. No bleeding problems or problems with his medications. He reported not needing any nitroglycerin over the past month. He feels really good since his PCI. He is tolerating current medications without significant side effects. We discussed the need for lipid-lowering therapy. Patient is very adamant about being intolerant to statins, we discussed PCSK9 inhibitors and we will start him on Repatha. Counseled him regarding medication use and possible side effects from it.       PAST MEDICAL HISTORY:        Past Medical History:   Diagnosis Date    Abnormal stress test 2017    Arrhythmia     ASHD (arteriosclerotic heart disease)     Cerebral artery occlusion with cerebral infarction (HCC)     TIA    CKD (chronic kidney disease)     Closed traumatic fracture of right femur with routine healing 1955    trauma while in [employer], plate and scews placed and removed    COPD (chronic obstructive pulmonary disease) (HCC)     Elevated PSA     Enlarged prostate     Gallstones     Gout     History of kidney stones     Hx of blood clots     Hx of cardiac cath 11/02/2021    [facility]) [facility]/Dr. Posadas/Right Radial     Hydrocele in adult     Hyperlipidemia     Hypertension     Renal stones     Trigger finger     Vertigo     Vertigo     Wrist fracture, left        CURRENT ALLERGIES: Penicillins REVIEW OF SYSTEMS: 14 systems were reviewed. Pertinent positives and negatives as above, all else negative. Past Surgical History:   Procedure Laterality Date    APPENDECTOMY      22 yo    CARDIAC CATHETERIZATION Left 2019    [facility]/ : OMAR to mid RCA and mid LAD weith Dr. Madeline Chavarria  2021    CYSTOURETHROSCOPY     [facility]    plate and screws s/p trauma  and taken out in [address] [place] [address]  2012    JOINT REPLACEMENT      LITHOTRIPSY      PROSTATE BIOPSY  10/22/1998, 2004    TOTAL HIP ARTHROPLASTY Right     Social History:  Social History     Tobacco Use    Smoking status: Former Smoker     Packs/day: 1.00     Years: 25.00     Pack years: 25.00     Quit date: 1974     Years since quittin.9    Smokeless tobacco: Never Used   Vaping Use    Vaping Use: Never used   Substance Use Topics    Alcohol use: No     Comment: quit 38 years ago.     Drug use: No        CURRENT MEDICATIONS:        Outpatient Medications Marked as Taking for the 21 encounter (Office Visit) with Sonia Ludwig MD   Medication Sig Dispense Refill    metoprolol succinate (TOPROL XL) 25 MG extended release tablet Take 0.5 tablets by mouth daily 90 tablet 3    nitroGLYCERIN (NITROSTAT) 0.4 MG SL tablet Place 1 tablet under the tongue every 5 minutes as needed for Chest pain 25 tablet 3    ezetimibe (ZETIA) 10 MG tablet TAKE ONE TABLET BY MOUTH DAILY 90 tablet 3    amLODIPine (NORVASC) 2.5 MG tablet TAKE ONE TABLET BY MOUTH DAILY 90 tablet 3    Handicap Placard MISC by Does not apply route EXPIRATION DATE: 3 YEARS 1 each 0    clopidogrel (PLAVIX) 75 MG tablet take 1 tablet by mouth once daily (Patient taking differently: Take 75 mg by mouth daily ) 90 tablet 3    zinc gluconate 50 MG tablet Take 50 mg by mouth daily      Omega-3 Fatty Acids (FISH OIL) 1000 MG CAPS Take 3,000 mg by mouth daily       aspirin 81 MG EC tablet Take 81 mg by mouth daily. FAMILY HISTORY: family history includes Arrhythmia in his sister; Heart Attack in his father and mother; Heart Disease in his father and mother; Hypertension in his mother; Kidney Disease in his brother; No Known Problems in his brother; Parkinsonism in his father. Physical Examination:      /64 (Site: Right Upper Arm, Position: Sitting, Cuff Size: Large Adult)   Pulse 67   Resp 18   Ht 5' 7.99\" (1.727 m)   Wt 170 lb 6.4 oz (77.3 kg)   SpO2 99%   BMI 25.92 kg/m²  Body mass index is 25.92 kg/m². Constitutional: He is oriented to person. He appears well-developed and well-nourished. In no acute distress. HEENT: Normocephalic and atraumatic. No JVD present. Carotid bruit is not present. No mass and no thyromegaly present. No lymphadenopathy present. Cardiovascular: Normal rate, regular rhythm, normal heart sounds. Exam reveals no gallop and no friction rubs. No murmur was heard. .  Pulmonary/Chest: Effort normal and breath sounds normal. No respiratory distress. He has no wheezes, rhonchi or rales. Abdominal: Soft, non-tender. Bowel sounds and aorta are normal. He exhibits no organomegaly, mass or bruit. Extremities: No edema. No cyanosis or clubbing. 2+ radial and carotid pulses. Distal extremity pulses: 2+ bilaterally. Neurological: He is alert and oriented to person. No evidence of gross cranial nerve deficit. Coordination appeared normal.   Skin: Skin is warm and dry. There is no rash or diaphoresis. Psychiatric: He has a normal mood and affect.  His speech is normal and behavior is normal.      MOST RECENT LABS ON RECORD:   Lab Results   Component Value Date    WBC 8.3 11/09/2021    HGB 12.9 (L) 11/09/2021    HCT 40.5 (L) 11/09/2021     11/09/2021    CHOL 185 10/19/2021    TRIG 111 10/19/2021    HDL 36 (L) 10/19/2021    ALT 9 10/19/2021    AST 19 10/19/2021     11/09/2021    K 4.5 11/09/2021     11/09/2021    CREATININE 1.26 (H) 11/09/2021    BUN 21 11/09/2021    CO2 18 (L) 11/09/2021    TSH 3.10 04/30/2021    PSA 9.27 (H) 07/18/2012    INR 1.1 04/08/2021    LABA1C 5.8 10/19/2021     (H) 07/07/2020       ASSESSMENT:     1. ASHD (arteriosclerotic heart disease)    2. S/P angioplasty with stent    3. Essential hypertension    4. Mixed hyperlipidemia    5. TIA (transient ischemic attack)    6. Dizziness and giddiness       PLAN:      Atherosclerotic heart disease  History of coronary artery stenting back in 2019 for similar symptoms and recently stenting done on 11/8/2021  Antiplatelet Agent: Continue aspirin 81 mg daily and clopidogrel (Plavix 75 mg daily. I also reminded him to watch for signs of blood in his stool or black tarry stools and stop the medication immediately if this develops as this could be life threatening. Beta Blocker Therapy: Continue metoprolol succinate (Toprol XL)  12.5 mg daily I discussed the potential side effects of this medication including lightheadedness and dizziness and told him to call the office if this occurs. Calcium Channel Blocker: Continue amlodipine (Norvasc) 2.5 mg daily. Other Anti-anginal medications: ContinueNitroglycerin as needed. Statin Therapy: History of intolerance to statin therapy. Currently on Zetia 10 mg daily. LDL is uncontrolled.  Start Repatha 140 mg injection every 4 weeks. Counseled patient regarding possible side effects including allergic reaction and injection site reactions.  Counseled the patient on the need for a cholesterol reduction medication due to recent coronary artery stenting. I did advise them that if he does does start a statin he will start developing blockages within a few years.  After talking to the patient he is willing to start a once a month injection called Repatha. I will look into this more to try to get it approved for him. We discussed the potential benefits of cardiac rehab to improve both his cardiac condition as well as improve his exercise tolerance and overall quality of life. He was very agreeable with this and therefore I made the referral for Phase II cardiac rehab. · Transient ischemic attack: on 4/8/2021  Antiplatelet Agent: Continue aspirin 81 mg daily and Plavix 75 mg daily. · Continue Zetia. History of statin intolerance. · I reviewed his CTA of the head and neck and MRI of the brain. 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 · Currently has no neurological deficit. · Continue follow-up with neurology as previously scheduled. · Lightheadedness/dizziness: Getting much better. · His stage III chronic kidney disease  · Counseled regarding adequate hydration. · Beta Blocker: Continue Metoprolol succinate (Toprol XL) 25 mg 1/2 tab daily. · Calcium Channel Blocker: Continue amlodipine (Norvasc) 5 mg 1/2 tab once daily. · Nonpharmacologic counseling: Because of his condition, I reminded him to try and keep himself well-hydrated and to take extra time when moving from laying to sitting, sitting to standing and standing to walking. I also explained to him to help improve his symptoms he should include  1 or 2 L of sports drinks daily, knee-high compressions stockings.  Essential Hypertension: Controlled   o Beta Blocker: Continue Metoprolol succinate (Toprol XL) 25 mg 1/2 tab daily. · Calcium Channel Blocker: Continue amlodipine (Norvasc) 5 mg 1/2 tab once daily. · Hyperlipidemia: Mixed: Uncontrolled, history of statin intolerance  · Cholesterol Reduction Therapy: Continue ezetimide (Zetia) 10 mg daily. · Start Repatha 140 mg injection every 4 weeks as outlined above.   Counseled patient extensively regarding possible side effects from this class of medications including

## 2021-12-17 DIAGNOSIS — G45.9 TIA (TRANSIENT ISCHEMIC ATTACK): Primary | ICD-10-CM

## 2021-12-17 RX ORDER — CLOPIDOGREL BISULFATE 75 MG/1
TABLET ORAL
Qty: 90 TABLET | Refills: 3 | Status: SHIPPED | OUTPATIENT
Start: 2021-12-17

## 2022-03-07 ENCOUNTER — OFFICE VISIT (OUTPATIENT)
Dept: CARDIOLOGY | Age: 87
End: 2022-03-07
Payer: MEDICARE

## 2022-03-07 VITALS
RESPIRATION RATE: 16 BRPM | HEART RATE: 71 BPM | DIASTOLIC BLOOD PRESSURE: 65 MMHG | HEIGHT: 68 IN | OXYGEN SATURATION: 98 % | WEIGHT: 169 LBS | BODY MASS INDEX: 25.61 KG/M2 | SYSTOLIC BLOOD PRESSURE: 140 MMHG

## 2022-03-07 DIAGNOSIS — I10 ESSENTIAL HYPERTENSION: ICD-10-CM

## 2022-03-07 DIAGNOSIS — I20.8 CHRONIC STABLE ANGINA (HCC): ICD-10-CM

## 2022-03-07 DIAGNOSIS — Z78.9 STATIN INTOLERANCE: ICD-10-CM

## 2022-03-07 DIAGNOSIS — I25.10 ASHD (ARTERIOSCLEROTIC HEART DISEASE): Primary | ICD-10-CM

## 2022-03-07 DIAGNOSIS — R42 DIZZINESS AND GIDDINESS: ICD-10-CM

## 2022-03-07 DIAGNOSIS — E78.2 MIXED HYPERLIPIDEMIA: ICD-10-CM

## 2022-03-07 PROBLEM — I20.89 CHRONIC STABLE ANGINA: Status: ACTIVE | Noted: 2022-03-07

## 2022-03-07 PROCEDURE — 99214 OFFICE O/P EST MOD 30 MIN: CPT | Performed by: INTERNAL MEDICINE

## 2022-03-07 PROCEDURE — G8417 CALC BMI ABV UP PARAM F/U: HCPCS | Performed by: INTERNAL MEDICINE

## 2022-03-07 PROCEDURE — 99211 OFF/OP EST MAY X REQ PHY/QHP: CPT

## 2022-03-07 PROCEDURE — G8484 FLU IMMUNIZE NO ADMIN: HCPCS | Performed by: INTERNAL MEDICINE

## 2022-03-07 PROCEDURE — 1123F ACP DISCUSS/DSCN MKR DOCD: CPT | Performed by: INTERNAL MEDICINE

## 2022-03-07 PROCEDURE — 4040F PNEUMOC VAC/ADMIN/RCVD: CPT | Performed by: INTERNAL MEDICINE

## 2022-03-07 PROCEDURE — G8427 DOCREV CUR MEDS BY ELIG CLIN: HCPCS | Performed by: INTERNAL MEDICINE

## 2022-03-07 PROCEDURE — 1036F TOBACCO NON-USER: CPT | Performed by: INTERNAL MEDICINE

## 2022-03-07 NOTE — PATIENT INSTRUCTIONS
SURVEY:    You may be receiving a survey from [employer] regarding your visit today. Please complete the survey to enable us to provide the highest quality of care to you and your family. If you cannot score us a very good on any question, please call the office to discuss how we could have made your experience a very good one. Thank you.

## 2022-03-07 NOTE — PROGRESS NOTES
I, Evelia Bajwa RN am scribing for and in the presence of Richar Lagunas MD, F.A.C.C..    Patient: Joao Morales  : 1935  Date of Visit: 2022    REASON FOR VISIT / CONSULTATION: Follow-up (Pt reports doing fairly well since last visit but does complain of vertigo but varies from day to day. Denies SOB, Palpitations, CP. )      History of Present Illness:        Dear Gunner Cazares, APRN - CNP    I had the pleasure of seeing Joao Morales in my office today. Mr. Jm Vela is a 80 y.o. male who presented for follow-up. He initially presented with a chest tightness. Subsequent cardiac catheterizations showed two-vessel coronary artery disease. Patient status post PCI to LAD and RCA 3/28/2019. He denies any prior history of myocardial infarction or heart failure. No significant heart rhythm problem. He does have a long history of high cholesterol. He is a past smoker and has quit over 40 years ago. His mother had heart problems unsure about fathers history. S/P Heart Cath done on 3/28/019, S/P PCI to mid LAD and mid RCA. ECG 20 showed sinus rhythm with APCs and long first-degree AV block. No acute ischemic changes. Holter monitor worn on 20: No heart rate or rhythm abnormalities that can clearly explain his dizziness. Normal sinus rhythm with rare PAC's and PVC's with 1 PVC triplet. Carotid ultrasound on 2020 showed mild bilateral internal carotid artery stenosis. Bilateral vertebral arteries were patent with antegrade flow. TIA on 2021- MRI done on 2021 showed chronic microvascular disease without acute intracranial abnormality. Echo done on 2021- estimated EF of 50-55%. Moderate left ventricular hypertrophy and with normal left ventricular cavity size. Unable to assess specific wall motion abnormalities due to image quality. The patient has a sigmoid interventricular septum without evidence of outflow tract obstruction.   No significant valvular abnormalities. Evidence of mild (grade I) diastolic dysfunction is seen    Cardiac cath done on 11/2/2021 showed Severe single vessel disease involving a small to moderate sized PL branch of the RCA with moderate to severe disease in the PDA and D2 branch of the LAD. Normal left ventricular end diastolic pressure    Successful PTCA -OMAR mid LAD (Distal segment), Successful PTCA - OMAR PL branch / RCA by Dr Marylee Barrier on 11/8/2021    Mr. Olga Carvalho reports doing fairly well since last visit but still continues to vertigo. He is riding a stationary bicycle at home and notices that his strength is better. He did say that the [facility] is needing more documentation to get his Repatha approved so we will send more documentation regarding his intolerance to statins and his need for Repatha. He denies any chest pain, pressure or tightness. He has had no shortness of breath. No palpitations. No bleeding problems or problems with his medications. He reported not needing any nitroglycerin over the past month. He feels really good since his PCI. He is tolerating current medications without significant side effects.      Wt Readings from Last 3 Encounters:   03/07/22 169 lb (76.7 kg)   01/27/22 173 lb (78.5 kg)   12/07/21 170 lb 6.4 oz (77.3 kg)       PAST MEDICAL HISTORY:        Past Medical History:   Diagnosis Date    Abnormal stress test 2017    Arrhythmia     ASHD (arteriosclerotic heart disease)     Cerebral artery occlusion with cerebral infarction (HCC)     TIA    CKD (chronic kidney disease)     Closed traumatic fracture of right femur with routine healing 1955    trauma while in [employer], plate and scews placed and removed    COPD (chronic obstructive pulmonary disease) ([facility]     Elevated PSA     Enlarged prostate     Gallstones     Gout     History of kidney stones     Hx of blood clots     Hx of cardiac cath 11/02/2021    [facility]) [facility]/Dr. Posadas/Right Radial     Hydrocele in adult     Hyperlipidemia     Hypertension     Renal stones     Trigger finger     Vertigo     Vertigo     Wrist fracture, left        CURRENT ALLERGIES: Penicillins REVIEW OF SYSTEMS: 14 systems were reviewed. Pertinent positives and negatives as above, all else negative. Past Surgical History:   Procedure Laterality Date    APPENDECTOMY      20 yo    CARDIAC CATHETERIZATION Left 2019    [facility]/ : OMAR to mid RCA and mid LAD weith Dr. Zan Ramsey  2021    CYSTOURETHROSCOPY     [facility]    plate and screws s/p trauma  and taken out in [address] [place] [address]  2012    JOINT REPLACEMENT      LITHOTRIPSY      PROSTATE BIOPSY  10/22/1998, 2004    TOTAL HIP ARTHROPLASTY Right     Social History:  Social History     Tobacco Use    Smoking status: Former Smoker     Packs/day: 1.00     Years: 25.00     Pack years: 25.00     Quit date: 1974     Years since quittin.1    Smokeless tobacco: Never Used   Vaping Use    Vaping Use: Never used   Substance Use Topics    Alcohol use: No     Comment: quit 38 years ago.     Drug use: No        CURRENT MEDICATIONS:        Outpatient Medications Marked as Taking for the 3/7/22 encounter (Office Visit) with Fina Fermin MD   Medication Sig Dispense Refill    clopidogrel (PLAVIX) 75 MG tablet TAKE ONE TABLET BY MOUTH DAILY 90 tablet 3    metoprolol succinate (TOPROL XL) 25 MG extended release tablet Take 0.5 tablets by mouth daily 90 tablet 3    nitroGLYCERIN (NITROSTAT) 0.4 MG SL tablet Place 1 tablet under the tongue every 5 minutes as needed for Chest pain 25 tablet 3    ezetimibe (ZETIA) 10 MG tablet TAKE ONE TABLET BY MOUTH DAILY 90 tablet 3    amLODIPine (NORVASC) 2.5 MG tablet TAKE ONE TABLET BY MOUTH DAILY 90 tablet 3    Handicap Placard MISC by Does not apply route EXPIRATION DATE: 3 YEARS 1 each 0    zinc gluconate 50 MG tablet Take 50 mg by mouth daily      Omega-3 Fatty Acids (FISH OIL) 1000 MG CAPS Take 3,000 mg by mouth daily       aspirin 81 MG EC tablet Take 81 mg by mouth daily. FAMILY HISTORY: family history includes Arrhythmia in his sister; Heart Attack in his father and mother; Heart Disease in his father and mother; Hypertension in his mother; Kidney Disease in his brother; No Known Problems in his brother; Parkinsonism in his father. Physical Examination:      BP (!) 177/92 (Site: Right Upper Arm, Position: Sitting, Cuff Size: Medium Adult)   Pulse 71   Resp 16   Ht 5' 8\" (1.727 m)   Wt 169 lb (76.7 kg)   SpO2 98%   BMI 25.70 kg/m²  Body mass index is 25.7 kg/m². Constitutional: He is oriented to person. He appears well-developed and well-nourished. In no acute distress. HEENT: Normocephalic and atraumatic. No JVD present. Carotid bruit is not present. No mass and no thyromegaly present. No lymphadenopathy present. Cardiovascular: Normal rate, regular rhythm, normal heart sounds. Exam reveals no gallop and no friction rubs. No murmur was heard. .  Pulmonary/Chest: Effort normal and breath sounds normal. No respiratory distress. He has no wheezes, rhonchi or rales. Abdominal: Soft, non-tender. Bowel sounds and aorta are normal. He exhibits no organomegaly, mass or bruit. Extremities: No edema. No cyanosis or clubbing. 2+ radial and carotid pulses. Distal extremity pulses: 2+ bilaterally. Neurological: He is alert and oriented to person. No evidence of gross cranial nerve deficit. Coordination appeared normal.   Skin: Skin is warm and dry. There is no rash or diaphoresis. Psychiatric: He has a normal mood and affect.  His speech is normal and behavior is normal.      MOST RECENT LABS ON RECORD:   Lab Results   Component Value Date    WBC 8.3 11/09/2021    HGB 12.9 (L) 11/09/2021    HCT 40.5 (L) 11/09/2021     11/09/2021    CHOL 185 10/19/2021    TRIG 111 10/19/2021 HDL 36 (L) 10/19/2021    ALT 9 10/19/2021    AST 19 10/19/2021     11/09/2021    K 4.5 11/09/2021     11/09/2021    CREATININE 1.26 (H) 11/09/2021    BUN 21 11/09/2021    CO2 18 (L) 11/09/2021    TSH 3.10 04/30/2021    PSA 9.27 (H) 07/18/2012    INR 1.1 04/08/2021    LABA1C 5.8 10/19/2021     (H) 07/07/2020       ASSESSMENT:     1. ASHD (arteriosclerotic heart disease)    2. Chronic stable angina ([address]    3. Essential hypertension    4. Mixed hyperlipidemia    5. Dizziness and giddiness    6. Statin intolerance       PLAN:      Atherosclerotic heart disease  History of coronary artery stenting back in 2019 for similar symptoms and recently stenting done on 11/8/2021  Antiplatelet Agent: Continue aspirin 81 mg daily and clopidogrel (Plavix 75 mg daily. I also reminded him to watch for signs of blood in his stool or black tarry stools and stop the medication immediately if this develops as this could be life threatening. Beta Blocker Therapy: Continue metoprolol succinate (Toprol XL)  12.5 mg daily  Calcium Channel Blocker: Continue amlodipine (Norvasc) 2.5 mg daily. Other Anti-anginal medications: ContinueNitroglycerin as needed. Statin Therapy: History of intolerance to statin therapy due to myalgia. .  Not tolerate low-dose Lipitor before. Currently on Zetia 10 mg daily. LDL is uncontrolled.  Start Repatha 140 mg injection every 4 weeks. Counseled patient regarding possible side effects including allergic reaction and injection site reactions.  Counseled the patient on the need for a cholesterol reduction medication due to recent coronary artery stenting. I did advise them that if he does does start a statin he will start developing blockages within a few years. After talking to the patient he is willing to start a once a month injection called Repatha. I will look into this more to try to get it approved for him. Continue cardiac rehab.      · Transient ischemic attack 4/8/2021 his CTA of the head and neck and MRI of the brain. 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 Antiplatelet Agent: Continue aspirin 81 mg daily and Plavix 75 mg daily. · Continue Zetia. History of statin intolerance. · Currently has no neurological deficit. · Continue follow-up with neurology as previously scheduled. · Lightheadedness/dizziness: Caused from Vertigo-His stage III chronic kidney disease-Counseled regarding adequate hydration. · Beta Blocker: Continue Metoprolol succinate (Toprol XL) 25 mg 1/2 tab daily. · Calcium Channel Blocker: Continue amlodipine (Norvasc) 5 mg 1/2 tab once daily. · Nonpharmacologic counseling: Because of his condition, I reminded him to try and keep himself well-hydrated and to take extra time when moving from laying to sitting, sitting to standing and standing to walking. I also explained to him to help improve his symptoms he should include  1 or 2 L of sports drinks daily, knee-high compressions stockings.  Essential Hypertension: Controlled   o Beta Blocker: Continue Metoprolol succinate (Toprol XL) 25 mg 1/2 tab daily. · Calcium Channel Blocker: Continue amlodipine (Norvasc) 5 mg 1/2 tab once daily. · Hyperlipidemia: Mixed: Uncontrolled, history of statin intolerance  · Cholesterol Reduction Therapy: Continue ezetimide (Zetia) 10 mg daily. Not able to tolerate statins at this time, it causes myalgia in his legs. Previously tried on low-dose Lipitor and could not tolerate it. He is at high risk for atherosclerotic cardiovascular and cerebrovascular disease so PCSK9 inhibitors clearly indicated. · Start Repatha 140 mg injection every 4 weeks as outlined above. Counseled patient extensively  regarding possible side effects from this class of medications including allergic reaction and injection  site reaction. I did explain to him that this lipid-lowering therapy is not a statin.     Finally, I recommended that he continue his other medications and follow up with you as previously scheduled. FOLLOW UP:   I told Mr. Melinda Muir to call my office if he had any problems, but otherwise I asked him to Return in about 6 months (around 9/7/2022). However, I would be happy to see him sooner should the need arise. Sincerely,  Randy Flores MD, F.A.C.C. [facility] Cardiology Specialist    [address] [place] [address]  Phone: [phone], Fax: [phone]     I believe that the risk of significant morbidity and mortality related to the patient's current medical conditions are: intermediate-high. Between 30 and 44 minutes were spent during prep work, discussion and exam of the patient, and follow up documentation and all of their questions were answered. The documentation recorded by the scribe, accurately and completely reflects the services I personally performed and the decisions made by me. Randy Flores MD, F.A.C.C.  March 7, 2022

## 2022-03-08 ENCOUNTER — TELEPHONE (OUTPATIENT)
Dept: CARDIOLOGY | Age: 87
End: 2022-03-08

## 2022-03-08 RX ORDER — EVOLOCUMAB 140 MG/ML
140 INJECTION, SOLUTION SUBCUTANEOUS
Qty: 2.1 ML | Refills: 3 | Status: SHIPPED | OUTPATIENT
Start: 2022-03-08 | End: 2022-04-13 | Stop reason: ALTCHOICE

## 2022-04-12 ENCOUNTER — TELEPHONE (OUTPATIENT)
Dept: CARDIOLOGY | Age: 87
End: 2022-04-12

## 2022-04-12 NOTE — TELEPHONE ENCOUNTER
Pharmacist from [facility] called and would like to know if they can change Repatha to Praluent? Praluent is on pt formulary?

## 2022-04-13 RX ORDER — ALIROCUMAB 75 MG/ML
75 INJECTION, SOLUTION SUBCUTANEOUS
Qty: 2.24 ML | Refills: 3 | Status: SHIPPED | OUTPATIENT
Start: 2022-04-13

## 2022-04-13 RX ORDER — ALIROCUMAB 75 MG/ML
75 INJECTION, SOLUTION SUBCUTANEOUS
COMMUNITY
End: 2022-04-13 | Stop reason: SDUPTHER

## 2022-04-13 NOTE — TELEPHONE ENCOUNTER
Attempted to call pharmacist back and is wrong number. Faxed script to [address] Pt has tried simvastatin and lipitor in the past and has intolerance to statins due to myalagia.

## 2022-06-16 ENCOUNTER — HOSPITAL ENCOUNTER (OUTPATIENT)
Age: 87
Discharge: HOME | End: 2022-06-18
Payer: MEDICARE

## 2022-06-16 ENCOUNTER — HOSPITAL ENCOUNTER (OUTPATIENT)
Dept: GENERAL RADIOLOGY | Age: 87
Discharge: HOME | End: 2022-06-18
Payer: MEDICARE

## 2022-06-16 DIAGNOSIS — R05.9 COUGH: ICD-10-CM

## 2022-06-16 PROCEDURE — 71046 X-RAY EXAM CHEST 2 VIEWS: CPT

## 2022-07-14 PROBLEM — I50.22 CHRONIC SYSTOLIC (CONGESTIVE) HEART FAILURE (HCC): Status: ACTIVE | Noted: 2022-07-14

## 2022-07-14 PROBLEM — N18.30 CHRONIC RENAL DISEASE, STAGE III (HCC): Status: ACTIVE | Noted: 2022-07-14

## 2022-07-18 RX ORDER — AMLODIPINE BESYLATE 2.5 MG/1
2.5 TABLET ORAL DAILY
Qty: 90 TABLET | Refills: 3 | Status: SHIPPED | OUTPATIENT
Start: 2022-07-18

## 2022-09-06 ENCOUNTER — OFFICE VISIT (OUTPATIENT)
Dept: CARDIOLOGY | Age: 87
End: 2022-09-06
Payer: MEDICARE

## 2022-09-06 VITALS
OXYGEN SATURATION: 99 % | BODY MASS INDEX: 24.31 KG/M2 | RESPIRATION RATE: 18 BRPM | HEIGHT: 68 IN | DIASTOLIC BLOOD PRESSURE: 66 MMHG | WEIGHT: 160.4 LBS | SYSTOLIC BLOOD PRESSURE: 144 MMHG | HEART RATE: 67 BPM

## 2022-09-06 DIAGNOSIS — I10 ESSENTIAL HYPERTENSION: ICD-10-CM

## 2022-09-06 DIAGNOSIS — N18.32 STAGE 3B CHRONIC KIDNEY DISEASE (HCC): ICD-10-CM

## 2022-09-06 DIAGNOSIS — I25.10 ASHD (ARTERIOSCLEROTIC HEART DISEASE): Primary | ICD-10-CM

## 2022-09-06 DIAGNOSIS — G45.9 TIA (TRANSIENT ISCHEMIC ATTACK): ICD-10-CM

## 2022-09-06 DIAGNOSIS — R42 DIZZINESS AND GIDDINESS: ICD-10-CM

## 2022-09-06 DIAGNOSIS — R53.83 OTHER FATIGUE: ICD-10-CM

## 2022-09-06 DIAGNOSIS — E78.2 MIXED HYPERLIPIDEMIA: ICD-10-CM

## 2022-09-06 DIAGNOSIS — R07.89 ATYPICAL CHEST PAIN: ICD-10-CM

## 2022-09-06 DIAGNOSIS — R53.83 LACK OF ENERGY: ICD-10-CM

## 2022-09-06 DIAGNOSIS — Z78.9 STATIN INTOLERANCE: ICD-10-CM

## 2022-09-06 DIAGNOSIS — R06.02 SOB (SHORTNESS OF BREATH): ICD-10-CM

## 2022-09-06 LAB
ABSOLUTE BASO #: 0.06 K/UL (ref 0–0.2)
ABSOLUTE EOS #: 0.31 K/UL (ref 0–0.5)
ABSOLUTE LYMPH #: 1.12 K/UL (ref 1–4)
ABSOLUTE MONO #: 0.5 K/UL (ref 0.2–1)
ABSOLUTE NEUT #: 4.36 K/UL (ref 1.5–7.5)
BASOPHILS RELATIVE PERCENT: 0.9 %
EOSINOPHILS RELATIVE PERCENT: 4.9 %
HCT VFR BLD CALC: 42.5 % (ref 40–51)
HEMOGLOBIN: 13.3 G/DL (ref 13.5–17)
LYMPHOCYTE %: 17.6 %
MCH RBC QN AUTO: 26.3 PG (ref 25–33)
MCHC RBC AUTO-ENTMCNC: 31.3 G/DL (ref 31–36)
MCV RBC AUTO: 84.2 FL (ref 80–99)
MONOCYTES # BLD: 7.8 %
NEUTROPHILS RELATIVE PERCENT: 68.3 %
PDW BLD-RTO: 14.9 % (ref 11.5–15)
PLATELETS: 240 K/UL (ref 130–400)
PMV BLD AUTO: 10.6 FL (ref 9.3–13)
RBC: 5.05 M/UL (ref 4.5–6.1)
WBC: 6.4 K/UL (ref 3.5–11)

## 2022-09-06 PROCEDURE — G8427 DOCREV CUR MEDS BY ELIG CLIN: HCPCS | Performed by: INTERNAL MEDICINE

## 2022-09-06 PROCEDURE — 93005 ELECTROCARDIOGRAM TRACING: CPT | Performed by: INTERNAL MEDICINE

## 2022-09-06 PROCEDURE — 99211 OFF/OP EST MAY X REQ PHY/QHP: CPT | Performed by: INTERNAL MEDICINE

## 2022-09-06 PROCEDURE — 99214 OFFICE O/P EST MOD 30 MIN: CPT | Performed by: INTERNAL MEDICINE

## 2022-09-06 PROCEDURE — 93010 ELECTROCARDIOGRAM REPORT: CPT | Performed by: INTERNAL MEDICINE

## 2022-09-06 PROCEDURE — 1123F ACP DISCUSS/DSCN MKR DOCD: CPT | Performed by: INTERNAL MEDICINE

## 2022-09-06 PROCEDURE — G8420 CALC BMI NORM PARAMETERS: HCPCS | Performed by: INTERNAL MEDICINE

## 2022-09-06 PROCEDURE — 1036F TOBACCO NON-USER: CPT | Performed by: INTERNAL MEDICINE

## 2022-09-06 RX ORDER — ISOSORBIDE MONONITRATE 30 MG/1
30 TABLET, EXTENDED RELEASE ORAL DAILY
Qty: 90 TABLET | Refills: 3 | Status: SHIPPED | OUTPATIENT
Start: 2022-09-06

## 2022-09-06 NOTE — PATIENT INSTRUCTIONS
SURVEY:    You may be receiving a survey from [employer] regarding your visit today. Please complete the survey to enable us to provide the highest quality of care to you and your family. If you cannot score us a very good on any question, please call the office to discuss how we could have made your experience a very good one. Thank you.

## 2022-09-06 NOTE — PROGRESS NOTES
Carly Murdock am scribing for and in the presence of Ana Collier MD, F.A.C.C..     Patient: Sherry Frost  : 1935  Date of Visit: 2022    REASON FOR VISIT / CONSULTATION: Follow-up (Hx: ASHD, angina, Htn, HLD, dizziness. Pt is here for 6 month f/u. His son  6/15. He states he was having concentration issues, couldn't get names right of family members, took ten days to get names right. Brain fog, didn't trust himself driving. Felt very exhausted. Winded after walking 400 feet. Physical work causes SO. Does have dizziness of and on . Does have a history of vertigo. Has been taking 1 Nitro a day and it helps him feel better Denies; Cp, palps)      History of Present Illness:        Dear Theo Casey, APRN - CNP    I had the pleasure of seeing Sherry Frost in my office today. Mr. Marc Paulino is a 80 y.o. male who presented for follow-up. He initially presented with a chest tightness. Subsequent cardiac catheterizations showed two-vessel coronary artery disease. Patient status post PCI to LAD and RCA 3/28/2019. He denies any prior history of myocardial infarction or heart failure. No significant heart rhythm problem. He does have a long history of high cholesterol. He is a past smoker and has quit over 40 years ago. His mother had heart problems unsure about fathers history. S/P Heart Cath done on 3/28/2019, S/P PCI to mid LAD and mid RCA. ECG 20 showed sinus rhythm with APCs and long first-degree AV block. No acute ischemic changes. Holter monitor worn on 2020: No heart rate or rhythm abnormalities that can clearly explain his dizziness. Normal sinus rhythm with rare PAC's and PVC's with 1 PVC triplet. Carotid ultrasound on 2020 showed mild bilateral internal carotid artery stenosis. Bilateral vertebral arteries were patent with antegrade flow.     TIA on 2021- MRI done on 2021 showed chronic microvascular disease without acute intracranial abnormality. Echo done on 2021- estimated EF of 50-55%. Moderate left ventricular hypertrophy and with normal left ventricular cavity size. Unable to assess specific wall motion abnormalities due to image quality. The patient has a sigmoid interventricular septum without evidence of outflow tract obstruction. No significant valvular abnormalities. Evidence of mild (grade I) diastolic dysfunction is seen    Cardiac cath done on 2021 showed Severe single vessel disease involving a small to moderate sized PL branch of the RCA with moderate to severe disease in the PDA and D2 branch of the LAD. Normal left ventricular end diastolic pressure    Successful PTCA -OMAR mid LAD (Distal segment), Successful PTCA - OMAR PL branch / RCA by Dr Andrew Shell on 2021    Mr. Florin Mujica is here today for a follow up. His son  in  and around that time he had a lot of brain fog and exhaustion. He couldn't remember family members names, it took him 10 days to remember names. He continues to feel exhausted mentally and physically. He has an incline in his yard and walks down to burn papers and after he walks back up he is winded and has to sit down. He works for about 15 minutes at a time before having to take a break. He also has vertigo and has dizziness. He has had two falls since his last visit. One fall was straight back. He did not lose consciousness. He does not feel any chest pain but yesterday he had an ache in his right side chest. It did go away on its own. No palpitations. No bleeding problems or problems with his medications. No nausea or vomiting. He does have some problems moving his bowels, he does take OTC supplements to help. He has been losing weight. He has been drinking enough fluids. He does not have an appetite. He does drink Enfamil occasionally. EKG done in office today 2022- sinus rhythm, first degree AV block.     Wt Readings from Last 3 Encounters:   22 160 lb 6.4 oz (72.8 kg)   22 158 lb (71.7 kg)   22 158 lb (71.7 kg)       PAST MEDICAL HISTORY:        Past Medical History:   Diagnosis Date    Abnormal stress test     Arrhythmia     ASHD (arteriosclerotic heart disease)     Cerebral artery occlusion with cerebral infarction (HCC)     TIA    CKD (chronic kidney disease)     Closed traumatic fracture of right femur with routine healing 1955    trauma while in [employer], plate and scews placed and removed    COPD (chronic obstructive pulmonary disease) ([address]     Elevated PSA     Enlarged prostate     Gallstones     Gout     History of kidney stones     Hx of blood clots     Hx of cardiac cath 2021    [facility]) [facility]/Dr. Posadas/Right Radial     Hydrocele in adult     Hyperlipidemia     Hypertension     Renal stones     Trigger finger     Vertigo     Vertigo     Wrist fracture, left        CURRENT ALLERGIES: Penicillins REVIEW OF SYSTEMS: 14 systems were reviewed. Pertinent positives and negatives as above, all else negative. Past Surgical History:   Procedure Laterality Date    APPENDECTOMY      20 yo    CARDIAC CATHETERIZATION Left 2019    Right Radial/[facility]/ : OMAR to mid RCA and [facility] [place]  2021    CYSTOURETHROSCOPY      [facility]    plate and screws s/p trauma  and taken out in [address]  2012    JOINT REPLACEMENT      LITHOTRIPSY      PROSTATE BIOPSY  10/22/1998, 2004    TOTAL HIP ARTHROPLASTY Right     Social History:  Social History     Tobacco Use    Smoking status: Former     Packs/day: 1.00     Years: 25.00     Pack years: 25.00     Types: Cigarettes     Quit date: 1974     Years since quittin.6    Smokeless tobacco: Never   Vaping Use    Vaping Use: Never used   Substance Use Topics    Alcohol use: No     Comment: quit 38 years ago.     Drug use: No        CURRENT MEDICATIONS: Outpatient Medications Marked as Taking for the 9/6/22 encounter (Office Visit) with Sonia Ludwig MD   Medication Sig Dispense Refill    amLODIPine (NORVASC) 2.5 MG tablet Take 1 tablet by mouth in the morning. 90 tablet 3    alirocumab (PRALUENT) 75 MG/ML SOAJ injection pen Inject 1 mL into the skin every 14 days 2.24 mL 3    clopidogrel (PLAVIX) 75 MG tablet TAKE ONE TABLET BY MOUTH DAILY 90 tablet 3    metoprolol succinate (TOPROL XL) 25 MG extended release tablet Take 0.5 tablets by mouth daily 90 tablet 3    nitroGLYCERIN (NITROSTAT) 0.4 MG SL tablet Place 1 tablet under the tongue every 5 minutes as needed for Chest pain 25 tablet 3    ezetimibe (ZETIA) 10 MG tablet TAKE ONE TABLET BY MOUTH DAILY 90 tablet 3    Handicap Placard MISC by Does not apply route EXPIRATION DATE: 3 YEARS 1 each 0    zinc gluconate 50 MG tablet Take 50 mg by mouth daily      Omega-3 Fatty Acids (FISH OIL) 1000 MG CAPS Take 3,000 mg by mouth daily       aspirin 81 MG EC tablet Take 81 mg by mouth daily. FAMILY HISTORY: family history includes Arrhythmia in his sister; Heart Attack in his father and mother; Heart Disease in his father and mother; Hypertension in his mother; Kidney Disease in his brother; No Known Problems in his brother; Parkinsonism in his father. Physical Examination:      BP (!) 144/66 (Site: Right Upper Arm, Position: Sitting, Cuff Size: Medium Adult)   Pulse 67   Resp 18   Ht 5' 7.99\" (1.727 m)   Wt 160 lb 6.4 oz (72.8 kg)   SpO2 99%   BMI 24.39 kg/m²  Body mass index is 24.39 kg/m². Constitutional: He is oriented to person. He appears well-developed and well-nourished. In no acute distress. HEENT: Normocephalic and atraumatic. No JVD present. Carotid bruit is not present. No mass and no thyromegaly present. No lymphadenopathy present. Cardiovascular: Normal rate, regular rhythm, normal heart sounds. Exam reveals no gallop and no friction rubs.  No murmur was heard..  Pulmonary/Chest: Effort normal and breath sounds normal. No respiratory distress. He has no wheezes, rhonchi or rales. Abdominal: Soft, non-tender. Bowel sounds and aorta are normal. He exhibits no organomegaly, mass or bruit. Extremities: No edema. No cyanosis or clubbing. 2+ radial and carotid pulses. Distal extremity pulses: 2+ bilaterally. Neurological: He is alert and oriented to person. No evidence of gross cranial nerve deficit. Coordination appeared normal.   Skin: Skin is warm and dry. There is no rash or diaphoresis. Psychiatric: He has a normal mood and affect. His speech is normal and behavior is normal.      MOST RECENT LABS ON RECORD:   Lab Results   Component Value Date    WBC 7.6 07/14/2022    HGB 13.6 07/14/2022    HCT 41.8 07/14/2022     07/14/2022    CHOL 185 10/19/2021    TRIG 111 10/19/2021    HDL 36 (L) 10/19/2021    ALT 10 07/14/2022    AST 19 07/14/2022     07/14/2022    K 4.7 07/14/2022     07/14/2022    CREATININE 1.45 (H) 07/14/2022    BUN 21 07/14/2022    CO2 28 07/14/2022    TSH 3.360 07/14/2022    PSA 9.27 (H) 07/18/2012    INR 1.1 04/08/2021    LABA1C 5.8 10/19/2021     (H) 07/07/2020       ASSESSMENT:     1. ASHD (arteriosclerotic heart disease)    2. TIA (transient ischemic attack)    3. Essential hypertension    4. Mixed hyperlipidemia    5. Dizziness and giddiness    6. Statin intolerance    7. Lack of energy    8. Stage 3b chronic kidney disease ([address]    9. Other fatigue    10. Atypical chest pain       PLAN:      Atherosclerotic heart disease  History of coronary artery stenting back in 2019 . Another PCI done on 11/8/2021  Currently denies any chest pain. No chest pressure or tightness. Intermittently taking sublingual nitroglycerin to improve his breathing. Chronic shortness of breath, complaining of progressive fatigue. Antiplatelet Agent: Continue aspirin 81 mg daily and clopidogrel (Plavix 75 mg daily.  I also reminded him to shortness of breath as this could be life threatening. Transient ischemic attack 4/8/2021 his CTA of the head and neck and MRI of the brain. 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 Antiplatelet Agent: Continue aspirin 81 mg daily and Plavix 75 mg daily. Stop Zetia. Continue Repatha  Currently has no neurological deficit. Continue follow-up with neurology as previously scheduled. Lightheadedness/dizziness: Caused from Vertigo-His stage III chronic kidney disease-Counseled regarding adequate hydration. Beta Blocker: Continue Metoprolol succinate (Toprol XL) 25 mg 1/2 tab daily. Calcium Channel Blocker: Continue amlodipine (Norvasc) 5 mg 1/2 tab once daily. Nonpharmacologic counseling: Because of his condition, I reminded him to try and keep himself well-hydrated and to take extra time when moving from laying to sitting, sitting to standing and standing to walking. I also explained to him to help improve his symptoms he should include  1 or 2 L of sports drinks daily, knee-high compressions stockings. I ordered a CAM monitor to be worn for 1 week to assess for the cause of his dizziness particularly giving his resting sinus bradycardia and first-degree AV block. Essential Hypertension: Borderline controlled   Beta Blocker: Continue Metoprolol succinate (Toprol XL) 25 mg 1/2 tab daily. Calcium Channel Blocker: Continue amlodipine (Norvasc) 5 mg 1/2 tab once daily. Hyperlipidemia: Mixed: Uncontrolled, history of statin intolerance  Cholesterol Reduction Therapy: STOP ezetimide (Zetia) patient is trying to minimize the medications he is taking as much as he can. He is on a good dose of Repatha. We will continue to monitor lipid profile. Continue Repatha 140 mg injection every 4 weeks as outlined above.   Counseled patient extensively regarding possible side effects from this class of medications including allergic reaction and injection site reaction. I did explain to him that this lipid-lowering therapy is not a statin. Finally, I recommended that he continue his other medications and follow up with you as previously scheduled. FOLLOW UP:   I told Mr. Kishan Brink to call my office if he had any problems, but otherwise I asked him to Return in about 6 months (around 3/6/2023). However, I would be happy to see him sooner should the need arise. Sincerely,  Fede Moore MD, F.A.C.C. [facility] Specialist    [address] [place] [address]  Phone: [phone], Fax: [phone]     I believe that the risk of significant morbidity and mortality related to the patient's current medical conditions are: intermediate-high. Approximately 35 minutes were spent during prep work, discussion and exam of the patient, and follow up documentation and all of their questions were answered. The documentation recorded by the scribe, accurately and completely reflects the services I personally performed and the decisions made by me. Fede Moore MD, F.A.C.C.  September 6, 2022

## 2022-09-07 ENCOUNTER — TELEPHONE (OUTPATIENT)
Dept: CARDIOLOGY | Age: 87
End: 2022-09-07

## 2022-09-07 LAB
ANION GAP SERPL CALCULATED.3IONS-SCNC: 11 MEQ/L (ref 7–16)
BUN BLDV-MCNC: 27 MG/DL (ref 8–23)
CALCIUM SERPL-MCNC: 9.7 MG/DL (ref 8.5–10.5)
CHLORIDE BLD-SCNC: 104 MEQ/L (ref 95–107)
CO2: 26 MEQ/L (ref 19–31)
CREAT SERPL-MCNC: 1.49 MG/DL (ref 0.8–1.4)
EGFR IF NONAFRICAN AMERICAN: 45 ML/MIN/1.73
FOLATE: 30.5 UG/L
GLUCOSE: 75 MG/DL (ref 70–99)
POTASSIUM SERPL-SCNC: 4.8 MEQ/L (ref 3.5–5.4)
SODIUM BLD-SCNC: 141 MEQ/L (ref 133–146)
TSH SERPL DL<=0.05 MIU/L-ACNC: 3.61 UIU/ML (ref 0.4–4.1)
VITAMIN B-12: 472 PG/ML (ref 200–950)
VITAMIN D 25-HYDROXY: 38 NG/ML

## 2022-09-07 NOTE — TELEPHONE ENCOUNTER
----- Message from Scooter Mcconnell MD sent at 9/7/2022  7:55 AM EDT -----  Blood work is stable.  Thank you

## 2022-09-19 ENCOUNTER — HOSPITAL ENCOUNTER (OUTPATIENT)
Dept: INFUSION THERAPY | Age: 87
Discharge: HOME | End: 2022-09-19
Payer: MEDICARE

## 2022-09-19 VITALS
DIASTOLIC BLOOD PRESSURE: 84 MMHG | RESPIRATION RATE: 20 BRPM | SYSTOLIC BLOOD PRESSURE: 140 MMHG | OXYGEN SATURATION: 98 % | TEMPERATURE: 99.8 F | HEART RATE: 78 BPM

## 2022-09-19 DIAGNOSIS — U07.1 COVID-19: ICD-10-CM

## 2022-09-19 DIAGNOSIS — U07.1 COVID-19: Primary | ICD-10-CM

## 2022-09-19 PROCEDURE — M0222 HC BEBTELOVIMAB INJECTION: HCPCS

## 2022-09-19 PROCEDURE — 2580000003 HC RX 258: Performed by: NURSE PRACTITIONER

## 2022-09-19 PROCEDURE — 6360000002 HC RX W HCPCS: Performed by: NURSE PRACTITIONER

## 2022-09-19 RX ORDER — SODIUM CHLORIDE 9 MG/ML
5-250 INJECTION, SOLUTION INTRAVENOUS PRN
OUTPATIENT
Start: 2022-09-19

## 2022-09-19 RX ORDER — ALBUTEROL SULFATE 90 UG/1
4 AEROSOL, METERED RESPIRATORY (INHALATION) PRN
Status: CANCELLED | OUTPATIENT
Start: 2022-09-19

## 2022-09-19 RX ORDER — DIPHENHYDRAMINE HYDROCHLORIDE 50 MG/ML
50 INJECTION INTRAMUSCULAR; INTRAVENOUS
OUTPATIENT
Start: 2022-09-19

## 2022-09-19 RX ORDER — ALBUTEROL SULFATE 90 UG/1
4 AEROSOL, METERED RESPIRATORY (INHALATION) PRN
OUTPATIENT
Start: 2022-09-19

## 2022-09-19 RX ORDER — SODIUM CHLORIDE 9 MG/ML
INJECTION, SOLUTION INTRAVENOUS CONTINUOUS
OUTPATIENT
Start: 2022-09-19

## 2022-09-19 RX ORDER — SODIUM CHLORIDE 9 MG/ML
INJECTION, SOLUTION INTRAVENOUS CONTINUOUS
Status: DISCONTINUED | OUTPATIENT
Start: 2022-09-19 | End: 2022-09-20 | Stop reason: HOSPADM

## 2022-09-19 RX ORDER — SODIUM CHLORIDE 9 MG/ML
5-250 INJECTION, SOLUTION INTRAVENOUS PRN
Status: CANCELLED | OUTPATIENT
Start: 2022-09-19

## 2022-09-19 RX ORDER — BEBTELOVIMAB 87.5 MG/ML
175 INJECTION, SOLUTION INTRAVENOUS ONCE
Status: CANCELLED | OUTPATIENT
Start: 2022-09-19 | End: 2022-09-19

## 2022-09-19 RX ORDER — ONDANSETRON 2 MG/ML
8 INJECTION INTRAMUSCULAR; INTRAVENOUS
OUTPATIENT
Start: 2022-09-19

## 2022-09-19 RX ORDER — SODIUM CHLORIDE 0.9 % (FLUSH) 0.9 %
5-40 SYRINGE (ML) INJECTION PRN
Status: CANCELLED | OUTPATIENT
Start: 2022-09-19

## 2022-09-19 RX ORDER — ACETAMINOPHEN 325 MG/1
650 TABLET ORAL
Status: CANCELLED | OUTPATIENT
Start: 2022-09-19

## 2022-09-19 RX ORDER — SODIUM CHLORIDE 9 MG/ML
INJECTION, SOLUTION INTRAVENOUS CONTINUOUS
Status: CANCELLED | OUTPATIENT
Start: 2022-09-19

## 2022-09-19 RX ORDER — ACETAMINOPHEN 325 MG/1
650 TABLET ORAL
OUTPATIENT
Start: 2022-09-19

## 2022-09-19 RX ORDER — ONDANSETRON 2 MG/ML
8 INJECTION INTRAMUSCULAR; INTRAVENOUS
Status: CANCELLED | OUTPATIENT
Start: 2022-09-19

## 2022-09-19 RX ORDER — BEBTELOVIMAB 87.5 MG/ML
175 INJECTION, SOLUTION INTRAVENOUS ONCE
Status: COMPLETED | OUTPATIENT
Start: 2022-09-19 | End: 2022-09-19

## 2022-09-19 RX ORDER — EPINEPHRINE 1 MG/ML
0.3 INJECTION, SOLUTION, CONCENTRATE INTRAVENOUS PRN
Status: CANCELLED | OUTPATIENT
Start: 2022-09-19

## 2022-09-19 RX ORDER — EPINEPHRINE 1 MG/ML
0.3 INJECTION, SOLUTION, CONCENTRATE INTRAVENOUS PRN
OUTPATIENT
Start: 2022-09-19

## 2022-09-19 RX ORDER — SODIUM CHLORIDE 0.9 % (FLUSH) 0.9 %
5-40 SYRINGE (ML) INJECTION PRN
Status: DISCONTINUED | OUTPATIENT
Start: 2022-09-19 | End: 2022-09-20 | Stop reason: HOSPADM

## 2022-09-19 RX ORDER — DIPHENHYDRAMINE HYDROCHLORIDE 50 MG/ML
50 INJECTION INTRAMUSCULAR; INTRAVENOUS
Status: CANCELLED | OUTPATIENT
Start: 2022-09-19

## 2022-09-19 RX ADMIN — BEBTELOVIMAB 175 MG: 87.5 INJECTION, SOLUTION INTRAVENOUS at 14:35

## 2022-09-19 RX ADMIN — SODIUM CHLORIDE, PRESERVATIVE FREE 10 ML: 5 INJECTION INTRAVENOUS at 14:20

## 2022-09-19 RX ADMIN — SODIUM CHLORIDE: 9 INJECTION, SOLUTION INTRAVENOUS at 14:22

## 2022-09-19 NOTE — DISCHARGE INSTRUCTIONS
Reviewed FDA EUA Fact sheet and After Infusion Information sheet for discharge. Written copies provided to patient. Verbalized understanding. Encouraged PO fluids and rest. DC home with . .. You received an infusion of authorized for emergency use by the FDA. You should continue to self-isolate and use infection control measures (wear mask, isolate, social distance, avoid sharing personal items, clean and disinfect \"high touch\" surfaces, and frequent handwashing) according to CDC guidelines. Report all changes in your health to your doctors as soon as possible. Symptoms to report to your physician immediately or seek emergency medical care:    Fever, Chills, Shakes, Hives, Rash, Itching, Swelling of lips, mouth or throat    Shortness of breath, difficulty breathing or wheezing, Chest pain    Cough, Sneezing    Fatigue, muscle or joint pain/aching,    Headache    Weakness, numbness, tingling is any part of your body    Low blood pressure/Oxygen saturation levels   Dizziness, feeling faint    Nausea        These are not all of the possible side effects. Serious and unexpected side effects may happen.

## 2022-10-24 ENCOUNTER — TELEPHONE (OUTPATIENT)
Dept: CARDIOLOGY | Age: 87
End: 2022-10-24

## 2022-10-24 ENCOUNTER — HOSPITAL ENCOUNTER (OUTPATIENT)
Dept: NON INVASIVE DIAGNOSTICS | Age: 87
Discharge: HOME | End: 2022-10-24
Payer: MEDICARE

## 2022-10-24 DIAGNOSIS — Z78.9 STATIN INTOLERANCE: ICD-10-CM

## 2022-10-24 DIAGNOSIS — G45.9 TIA (TRANSIENT ISCHEMIC ATTACK): ICD-10-CM

## 2022-10-24 DIAGNOSIS — R42 DIZZINESS AND GIDDINESS: ICD-10-CM

## 2022-10-24 DIAGNOSIS — R53.83 OTHER FATIGUE: ICD-10-CM

## 2022-10-24 DIAGNOSIS — R06.02 SOB (SHORTNESS OF BREATH): ICD-10-CM

## 2022-10-24 DIAGNOSIS — I25.10 ASHD (ARTERIOSCLEROTIC HEART DISEASE): ICD-10-CM

## 2022-10-24 DIAGNOSIS — R53.83 LACK OF ENERGY: ICD-10-CM

## 2022-10-24 DIAGNOSIS — R07.89 ATYPICAL CHEST PAIN: ICD-10-CM

## 2022-10-24 DIAGNOSIS — E78.2 MIXED HYPERLIPIDEMIA: ICD-10-CM

## 2022-10-24 DIAGNOSIS — N18.32 STAGE 3B CHRONIC KIDNEY DISEASE (HCC): ICD-10-CM

## 2022-10-24 DIAGNOSIS — I10 ESSENTIAL HYPERTENSION: ICD-10-CM

## 2022-10-24 LAB
LV EF: 55 %
LVEF MODALITY: NORMAL

## 2022-10-24 PROCEDURE — 93306 TTE W/DOPPLER COMPLETE: CPT

## 2022-10-24 PROCEDURE — 93242 EXT ECG>48HR<7D RECORDING: CPT

## 2022-10-24 PROCEDURE — C8929 TTE W OR WO FOL WCON,DOPPLER: HCPCS

## 2022-10-24 PROCEDURE — 93243 EXT ECG>48HR<7D SCAN A/R: CPT

## 2022-10-24 NOTE — TELEPHONE ENCOUNTER
Pt is going to see Dr Prince Husain in [facility] for ENT. They wanted him to ask if his balance issues are caused by his heart condition.

## 2022-10-24 NOTE — PROGRESS NOTES
Explained policies and procedure of an echocardiogram/Doppler with Bubble study. Patient instructed on extended cardiac monitor indications and use. Diary sent with patient.  7 days

## 2022-10-27 ENCOUNTER — TELEPHONE (OUTPATIENT)
Dept: CARDIOLOGY | Age: 87
End: 2022-10-27

## 2022-10-27 NOTE — TELEPHONE ENCOUNTER
No clear cardiac etiology for his dizziness but if ENT evaluation is negative we may need to repeat the cardiac work-up.   Thank you

## 2023-01-03 RX ORDER — METOPROLOL SUCCINATE 25 MG/1
12.5 TABLET, EXTENDED RELEASE ORAL DAILY
Qty: 90 TABLET | Refills: 3 | Status: SHIPPED | OUTPATIENT
Start: 2023-01-03

## 2023-02-17 DIAGNOSIS — G45.9 TIA (TRANSIENT ISCHEMIC ATTACK): ICD-10-CM

## 2023-02-17 RX ORDER — CLOPIDOGREL BISULFATE 75 MG/1
TABLET ORAL
Qty: 90 TABLET | Refills: 3 | Status: SHIPPED | OUTPATIENT
Start: 2023-02-17

## 2023-03-06 ENCOUNTER — OFFICE VISIT (OUTPATIENT)
Dept: CARDIOLOGY | Age: 88
End: 2023-03-06
Payer: MEDICARE

## 2023-03-06 VITALS
RESPIRATION RATE: 18 BRPM | HEIGHT: 67 IN | SYSTOLIC BLOOD PRESSURE: 157 MMHG | BODY MASS INDEX: 25.62 KG/M2 | WEIGHT: 163.2 LBS | DIASTOLIC BLOOD PRESSURE: 79 MMHG | HEART RATE: 65 BPM | OXYGEN SATURATION: 100 %

## 2023-03-06 DIAGNOSIS — I10 ESSENTIAL HYPERTENSION: ICD-10-CM

## 2023-03-06 DIAGNOSIS — N18.32 STAGE 3B CHRONIC KIDNEY DISEASE (HCC): ICD-10-CM

## 2023-03-06 DIAGNOSIS — G45.9 TIA (TRANSIENT ISCHEMIC ATTACK): ICD-10-CM

## 2023-03-06 DIAGNOSIS — I25.10 ASHD (ARTERIOSCLEROTIC HEART DISEASE): Primary | ICD-10-CM

## 2023-03-06 DIAGNOSIS — R06.02 SOB (SHORTNESS OF BREATH): ICD-10-CM

## 2023-03-06 DIAGNOSIS — R42 DIZZINESS AND GIDDINESS: ICD-10-CM

## 2023-03-06 DIAGNOSIS — E78.2 MIXED HYPERLIPIDEMIA: ICD-10-CM

## 2023-03-06 PROCEDURE — 99211 OFF/OP EST MAY X REQ PHY/QHP: CPT | Performed by: INTERNAL MEDICINE

## 2023-03-06 PROCEDURE — G8417 CALC BMI ABV UP PARAM F/U: HCPCS | Performed by: INTERNAL MEDICINE

## 2023-03-06 PROCEDURE — G8484 FLU IMMUNIZE NO ADMIN: HCPCS | Performed by: INTERNAL MEDICINE

## 2023-03-06 PROCEDURE — 99214 OFFICE O/P EST MOD 30 MIN: CPT | Performed by: INTERNAL MEDICINE

## 2023-03-06 PROCEDURE — G8427 DOCREV CUR MEDS BY ELIG CLIN: HCPCS | Performed by: INTERNAL MEDICINE

## 2023-03-06 PROCEDURE — 1036F TOBACCO NON-USER: CPT | Performed by: INTERNAL MEDICINE

## 2023-03-06 PROCEDURE — 1123F ACP DISCUSS/DSCN MKR DOCD: CPT | Performed by: INTERNAL MEDICINE

## 2023-03-06 RX ORDER — AMLODIPINE BESYLATE 5 MG/1
5 TABLET ORAL DAILY
Qty: 90 TABLET | Refills: 3 | Status: SHIPPED | OUTPATIENT
Start: 2023-03-06

## 2023-03-06 NOTE — PATIENT INSTRUCTIONS
SURVEY:    You may be receiving a survey from [employer] regarding your visit today. Please complete the survey to enable us to provide the highest quality of care to you and your family. If you cannot score us a very good on any question, please call the office to discuss how we could have made your experience a very good one. Thank you.

## 2023-03-06 NOTE — PROGRESS NOTES
Cindy Fonseca am scribing for and in the presence of Maine Castro MD, F.A.C.C..     Patient: Ryan Darling  : 1935  Date of Visit: 2023    REASON FOR VISIT / CONSULTATION: Follow-up (HX:ASHD PT is here for 6 month follow up he states he is dealing with vertigo. Occasional palp. Denies:CP, SOB, )      History of Present Illness:        Dear Zay Mann, APRN - CNP    I had the pleasure of seeing Ryan Darling in my office today. Mr. Ken Andujar is a 80 y.o. male who presented for follow-up. He initially presented with a chest tightness. Subsequent cardiac catheterizations showed two-vessel coronary artery disease. Patient status post PCI to LAD and RCA 3/28/2019. He denies any prior history of myocardial infarction or heart failure. No significant heart rhythm problem. He does have a long history of high cholesterol. He is a past smoker and has quit over 40 years ago. His mother had heart problems unsure about fathers history. S/P Heart Cath done on 3/28/2019, S/P PCI to mid LAD and mid RCA. ECG 20 showed sinus rhythm with APCs and long first-degree AV block. No acute ischemic changes. Holter monitor worn on 2020: No heart rate or rhythm abnormalities that can clearly explain his dizziness. Normal sinus rhythm with rare PAC's and PVC's with 1 PVC triplet. Carotid ultrasound on 2020 showed mild bilateral internal carotid artery stenosis. Bilateral vertebral arteries were patent with antegrade flow. TIA on 2021- MRI done on 2021 showed chronic microvascular disease without acute intracranial abnormality. Echo done on 2021- estimated EF of 50-55%. Moderate left ventricular hypertrophy and with normal left ventricular cavity size. Unable to assess specific wall motion abnormalities due to image quality. The patient has a sigmoid interventricular septum without evidence of outflow tract obstruction. No significant valvular abnormalities. Evidence of mild (grade I) diastolic dysfunction is seen    Cardiac cath done on 11/2/2021 showed Severe single vessel disease involving a small to moderate sized PL branch of the RCA with moderate to severe disease in the PDA and D2 branch of the LAD. Normal left ventricular end diastolic pressure    Successful PTCA -OMAR mid LAD (Distal segment), Successful PTCA - OMAR PL branch / RCA by Dr Basilio Garcia on 11/8/2021    EKG done in office 9/6/2022- sinus rhythm, first degree AV block. Echo done on 10/24/2022- EF >55% The left ventricular cavity size is within normal limits and and severe left ventricular septal hypertrophy without evidence of a significant resting or valsalva induced outflow tract gradient. Normal mitral valve structure with mild mitral regurgitation. Evidence of moderate diastolic dysfunction is seen. Compared to the previous study of 11/1/21, no significant change was seen. No clear cardiac cause of a TIA was identified from this study. However, if suspicion for an embolic intracardiac etiology is high, consider additional testing by [facility], especially if this might . CAM monitor done on 10/24/2022    Mr. Ortiz is here today for a follow up. He states other than vertigo issues he is doing well. He was being treated by Dr Rowdy Aguila in [place]. He is not using a cane at this time. He states he is drinking enough fluids. He is down 3 pounds since last visit. Denies chest pain, pressure or pressure. No palpitations. No bleeding problems or problems with his medications. No nausea or vomiting. He does have some problems moving his bowels, he does take OTC supplements to help. He has been losing weight. He has been drinking enough fluids. He does not have an appetite. He does drink Enfamil occasionally.        Wt Readings from Last 3 Encounters:   03/06/23 163 lb 3.2 oz (74 kg)   09/06/22 160 lb 6.4 oz (72.8 kg)   07/28/22 158 lb (71.7 kg)       PAST MEDICAL HISTORY:        Past Medical History:   Diagnosis Date    Abnormal stress test 2017    Arrhythmia     ASHD (arteriosclerotic heart disease)     Cerebral artery occlusion with cerebral infarction (HCC)     TIA    CKD (chronic kidney disease)     Closed traumatic fracture of right femur with routine healing     trauma while in [employer], plate and scews placed and removed    COPD (chronic obstructive pulmonary disease) ([facility] [address]     Elevated PSA     Enlarged prostate     Gallstones     Gout     History of kidney stones     Hx of blood clots     Hx of cardiac cath 2021    [facility]/Dr. Posadas/Right Radial     Hydrocele in adult     Hyperlipidemia     Hypertension     Renal stones     Trigger finger     Vertigo     Vertigo     Wrist fracture, left        CURRENT ALLERGIES: Penicillins REVIEW OF SYSTEMS: 14 systems were reviewed. Pertinent positives and negatives as above, all else negative. Past Surgical History:   Procedure Laterality Date    APPENDECTOMY      22 yo    CARDIAC CATHETERIZATION Left 2019    Right Radial/[facility]/ : OMAR to mid RCA and mid LAD weith Dr. Shaw Gallegos  2021    CYSTOURETHROSCOPY      FEMUR SURGERY      plate and screws s/p trauma  and taken out in [address]  2012    JOINT REPLACEMENT      LITHOTRIPSY      PROSTATE BIOPSY  10/22/1998, 2004    TOTAL HIP ARTHROPLASTY Right     Social History:  Social History     Tobacco Use    Smoking status: Former     Packs/day: 1.00     Years: 25.00     Pack years: 25.00     Types: Cigarettes     Quit date: 1974     Years since quittin.1    Smokeless tobacco: Never   Vaping Use    Vaping Use: Never used   Substance Use Topics    Alcohol use: No     Comment: quit 38 years ago.     Drug use: No        CURRENT MEDICATIONS:        Outpatient Medications Marked as Taking for the 3/6/23 encounter (Office Visit) with Tamiko Falcon MD Medication Sig Dispense Refill    clopidogrel (PLAVIX) 75 MG tablet TAKE ONE TABLET BY MOUTH DAILY 90 tablet 3    metoprolol succinate (TOPROL XL) 25 MG extended release tablet Take 0.5 tablets by mouth daily 90 tablet 3    isosorbide mononitrate (IMDUR) 30 MG extended release tablet Take 1 tablet by mouth daily 90 tablet 3    amLODIPine (NORVASC) 2.5 MG tablet Take 1 tablet by mouth in the morning. 90 tablet 3    alirocumab (PRALUENT) 75 MG/ML SOAJ injection pen Inject 1 mL into the skin every 14 days (Patient taking differently: Inject 150 mg into the skin every 30 days) 2.24 mL 3    nitroGLYCERIN (NITROSTAT) 0.4 MG SL tablet Place 1 tablet under the tongue every 5 minutes as needed for Chest pain 25 tablet 3    Handicap Placard MISC by Does not apply route EXPIRATION DATE: 3 YEARS 1 each 0    zinc gluconate 50 MG tablet Take 50 mg by mouth daily      Omega-3 Fatty Acids (FISH OIL) 1000 MG CAPS Take 3,000 mg by mouth daily       aspirin 81 MG EC tablet Take 81 mg by mouth daily. FAMILY HISTORY: family history includes Arrhythmia in his sister; Heart Attack in his father and mother; Heart Disease in his father and mother; Hypertension in his mother; Kidney Disease in his brother; No Known Problems in his brother; Parkinsonism in his father. Physical Examination:      BP (!) 158/76 (Site: Right Upper Arm, Position: Sitting, Cuff Size: Medium Adult)   Pulse 68   Resp 18   Ht 5' 7\" (1.702 m)   Wt 163 lb 3.2 oz (74 kg)   SpO2 100%   BMI 25.56 kg/m²  Body mass index is 25.56 kg/m². Constitutional: He is oriented to person. He appears well-developed and well-nourished. In no acute distress. HEENT: Normocephalic and atraumatic. No JVD present. Carotid bruit is not present. No mass and no thyromegaly present. No lymphadenopathy present. Cardiovascular: Normal rate, regular rhythm, normal heart sounds. Exam reveals no gallop and no friction rubs. No murmur was heard.   Pulmonary/Chest: Effort normal and breath sounds normal. No respiratory distress. He has no wheezes, rhonchi or rales. Abdominal: Soft, non-tender. Bowel sounds and aorta are normal. He exhibits no organomegaly, mass or bruit. Extremities: No edema. No cyanosis or clubbing. 2+ radial and carotid pulses. Distal extremity pulses: 2+ bilaterally. Neurological: He is alert and oriented to person. No evidence of gross cranial nerve deficit. Coordination appeared normal.   Skin: Skin is warm and dry. There is no rash or diaphoresis. Psychiatric: He has a normal mood and affect. His speech is normal and behavior is normal.      MOST RECENT LABS ON RECORD:   Lab Results   Component Value Date    WBC 6.4 09/06/2022    HGB 13.3 (L) 09/06/2022    HCT 42.5 09/06/2022     09/06/2022    CHOL 185 10/19/2021    TRIG 111 10/19/2021    HDL 36 (L) 10/19/2021    ALT 10 07/14/2022    AST 19 07/14/2022     09/06/2022    K 4.8 09/06/2022     09/06/2022    CREATININE 1.49 (H) 09/06/2022    BUN 27 (H) 09/06/2022    CO2 26 09/06/2022    TSH 3.61 09/06/2022    PSA 9.27 (H) 07/18/2012    INR 1.1 04/08/2021    LABA1C 5.8 10/19/2021     (H) 07/07/2020       ASSESSMENT:     1. ASHD (arteriosclerotic heart disease)    2. SOB (shortness of breath)    3. Atypical chest pain    4. TIA (transient ischemic attack)    5. Dizziness and giddiness    6. Essential hypertension    7. Mixed hyperlipidemia      PLAN:      Atherosclerotic heart disease  History of coronary artery stenting back in 2019 . Another PCI done on 11/8/2021  Currently denies any chest pain. No chest pressure or tightness. Intermittently taking sublingual nitroglycerin to improve his breathing. Chronic shortness of breath, complaining of progressive fatigue. Antiplatelet Agent: Continue aspirin 81 mg daily and clopidogrel (Plavix 75 mg daily.  I also reminded him to watch for signs of blood in his stool or black tarry stools and stop the medication immediately if this develops as this could be life threatening. Beta Blocker Therapy: Continue metoprolol succinate (Toprol XL)   12.5 mg daily  Calcium Channel Blocker: Increase amlodipine (Norvasc) 5 mg daily. Other Anti-anginal medications: ContinueNitroglycerin as needed. Continue Repatha 140 mg injection every 4 weeks. Counseled patient regarding possible side effects including allergic reaction and injection site reactions. History of statin intolerance. Transient ischemic attack 4/8/2021 his CTA of the head and neck and MRI of the brain. 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 Antiplatelet Agent: Continue aspirin 81 mg daily and Plavix 75 mg daily. Continue Repatha  Currently has no neurological deficit. Continue follow-up with neurology as previously scheduled. Lightheadedness/dizziness: Caused from Vertigo-  His stage III chronic kidney disease-Counseled regarding adequate hydration. Beta Blocker: Continue Metoprolol succinate (Toprol XL) 25 mg 1/2 tab daily. Calcium Channel Blocker: INCREASE to amlodipine (Norvasc) 5 mg once daily. I also discussed the potential side effects of this medication including lightheadedness and dizziness and instructed them to stop the medication of this occurs and call our office if this occurs. Nonpharmacologic counseling: Because of his condition, I reminded him to try and keep himself well-hydrated and to take extra time when moving from laying to sitting, sitting to standing and standing to walking. I also explained to him to help improve his symptoms he should include  1 or 2 L of sports drinks daily, knee-high compressions stockings. Referral placed for nephrology due to recent labs drawn at [facility]. His creatinine has increased from 1.5 to around 1.9. I did explain to him that he is still having stage IIIb chronic kidney disease. I told him we may need to get his blood pressure under control.     Essential Hypertension: Borderline controlled   Beta Blocker: Continue Metoprolol succinate (Toprol XL) 25 mg 1/2 tab daily. Calcium Channel Blocker: INCREASE to amlodipine (Norvasc) 5 mg once daily. Hyperlipidemia: Mixed: Uncontrolled, history of statin intolerance  Cholesterol Reduction Therapy:  He is on a good dose of Repatha. We will continue to monitor lipid profile. Continue Repatha 140 mg injection every 4 weeks as outlined above. Counseled patient extensively regarding possible side effects from this class of medications including allergic reaction and injection site reaction. I did explain to him that this lipid-lowering therapy is not a statin. Finally, I recommended that he continue his other medications and follow up with you as previously scheduled. FOLLOW UP:   I told Mr. Ken Andujar to call my office if he had any problems, but otherwise I asked him to Return in about 6 months (around 9/6/2023). However, I would be happy to see him sooner should the need arise. Sincerely,  Maine Castro MD, F.A.C.C. [facility] Cardiology Specialist    [address] [place] [address], [address] [place] [address]  Phone: [phone], Fax: [phone]     I believe that the risk of significant morbidity and mortality related to the patient's current medical conditions are: Intermediate to high > 35 minutes were spent during prep work, discussion and exam of the patient, and follow up documentation and all of their questions were answered. The documentation recorded by the scribe, accurately and completely reflects the services I personally performed and the decisions made by me. Maine Castro MD, F.A.C.C.  March 6, 2023

## 2023-09-11 ENCOUNTER — OFFICE VISIT (OUTPATIENT)
Dept: CARDIOLOGY | Age: 88
End: 2023-09-11
Payer: MEDICARE

## 2023-09-11 VITALS
HEART RATE: 60 BPM | OXYGEN SATURATION: 98 % | WEIGHT: 148 LBS | RESPIRATION RATE: 18 BRPM | BODY MASS INDEX: 23.23 KG/M2 | DIASTOLIC BLOOD PRESSURE: 61 MMHG | SYSTOLIC BLOOD PRESSURE: 106 MMHG | HEIGHT: 67 IN

## 2023-09-11 DIAGNOSIS — E78.2 MIXED HYPERLIPIDEMIA: ICD-10-CM

## 2023-09-11 DIAGNOSIS — I50.22 CHRONIC SYSTOLIC (CONGESTIVE) HEART FAILURE (HCC): ICD-10-CM

## 2023-09-11 DIAGNOSIS — I20.8 CHRONIC STABLE ANGINA (HCC): ICD-10-CM

## 2023-09-11 DIAGNOSIS — R94.31 ABNORMAL ECG: ICD-10-CM

## 2023-09-11 DIAGNOSIS — R06.02 SOB (SHORTNESS OF BREATH): ICD-10-CM

## 2023-09-11 DIAGNOSIS — I25.10 ASHD (ARTERIOSCLEROTIC HEART DISEASE): Primary | ICD-10-CM

## 2023-09-11 DIAGNOSIS — I50.32 CHRONIC DIASTOLIC CHF (CONGESTIVE HEART FAILURE), NYHA CLASS 3 (HCC): ICD-10-CM

## 2023-09-11 DIAGNOSIS — I73.9 PVD (PERIPHERAL VASCULAR DISEASE) (HCC): ICD-10-CM

## 2023-09-11 DIAGNOSIS — R42 DIZZINESS AND GIDDINESS: ICD-10-CM

## 2023-09-11 DIAGNOSIS — G45.9 TIA (TRANSIENT ISCHEMIC ATTACK): ICD-10-CM

## 2023-09-11 DIAGNOSIS — I10 ESSENTIAL HYPERTENSION: ICD-10-CM

## 2023-09-11 PROCEDURE — 1123F ACP DISCUSS/DSCN MKR DOCD: CPT | Performed by: INTERNAL MEDICINE

## 2023-09-11 PROCEDURE — G8427 DOCREV CUR MEDS BY ELIG CLIN: HCPCS | Performed by: INTERNAL MEDICINE

## 2023-09-11 PROCEDURE — 93010 ELECTROCARDIOGRAM REPORT: CPT | Performed by: INTERNAL MEDICINE

## 2023-09-11 PROCEDURE — 99215 OFFICE O/P EST HI 40 MIN: CPT | Performed by: INTERNAL MEDICINE

## 2023-09-11 PROCEDURE — 93005 ELECTROCARDIOGRAM TRACING: CPT | Performed by: INTERNAL MEDICINE

## 2023-09-11 PROCEDURE — G8420 CALC BMI NORM PARAMETERS: HCPCS | Performed by: INTERNAL MEDICINE

## 2023-09-11 PROCEDURE — 1036F TOBACCO NON-USER: CPT | Performed by: INTERNAL MEDICINE

## 2023-09-11 NOTE — PATIENT INSTRUCTIONS
SURVEY:    You may be receiving a survey from [email or web address] regarding your visit today. Please complete the survey to enable us to provide the highest quality of care to you and your family. If you cannot score us a very good on any question, please call the office to discuss how we could have made your experience a very good one. Thank you.

## 2023-09-11 NOTE — PROGRESS NOTES
Yulissa Munoz am scribing for and in the presence of Vinny Interiano MD, F.A.C.C..     Patient: Ольга Siddiqi  : 1935  Date of Visit: 2023    REASON FOR VISIT / CONSULTATION: Follow-up (Pt is here for SOB that has been worsening. He says he gets exhausted walking 50 feet and walking in here today, he has no strength in his legs and arms. He also gets light headed/dizzy and he says he has vertigo for the past 5 years now. Denies palps, CP. )    History of Present Illness:        Dear Victor Manuel Cottrell, APRN - CNP    I had the pleasure of seeing Ольга Siddiqi in my office today. Mr. Shane Hollis is a 80 y.o. male who presented for follow-up. He initially presented with a chest tightness. Subsequent cardiac catheterizations showed two-vessel coronary artery disease. Patient status post PCI to LAD and RCA 3/28/2019. He denies any prior history of myocardial infarction or heart failure. No significant heart rhythm problem. He does have a long history of high cholesterol. He is a past smoker and has quit over 40 years ago. His mother had heart problems unsure about fathers history. S/P Heart Cath done on 3/28/2019, S/P PCI to mid LAD and mid RCA. ECG 20 showed sinus rhythm with APCs and long first-degree AV block. No acute ischemic changes. Holter monitor worn on 2020: No heart rate or rhythm abnormalities that can clearly explain his dizziness. Normal sinus rhythm with rare PAC's and PVC's with 1 PVC triplet. Carotid ultrasound on 2020 showed mild bilateral internal carotid artery stenosis. Bilateral vertebral arteries were patent with antegrade flow. TIA on 2021- MRI done on 2021 showed chronic microvascular disease without acute intracranial abnormality. Echo done on 2021- estimated EF of 50-55%. Moderate left ventricular hypertrophy and with normal left ventricular cavity size.  Unable to assess specific wall motion abnormalities due to image

## 2023-09-14 ENCOUNTER — HOSPITAL ENCOUNTER (OUTPATIENT)
Age: 88
Setting detail: OUTPATIENT SURGERY
Discharge: HOME | End: 2023-09-14
Attending: FAMILY MEDICINE | Admitting: FAMILY MEDICINE
Payer: MEDICARE

## 2023-09-14 VITALS
RESPIRATION RATE: 15 BRPM | OXYGEN SATURATION: 99 % | HEART RATE: 72 BPM | SYSTOLIC BLOOD PRESSURE: 181 MMHG | DIASTOLIC BLOOD PRESSURE: 77 MMHG | TEMPERATURE: 97.3 F

## 2023-09-14 DIAGNOSIS — R94.39 ABNORMAL STRESS ECG: ICD-10-CM

## 2023-09-14 PROBLEM — I42.8 NON-ISCHEMIC CARDIOMYOPATHY (HCC): Status: ACTIVE | Noted: 2023-09-14

## 2023-09-14 LAB
ANION GAP SERPL CALCULATED.3IONS-SCNC: 12 MMOL/L (ref 9–17)
BASOPHILS # BLD: 0.06 K/UL (ref 0–0.2)
BASOPHILS NFR BLD: 1 % (ref 0–2)
BUN SERPL-MCNC: 43 MG/DL (ref 8–23)
BUN/CREAT SERPL: 23 (ref 9–20)
CALCIUM SERPL-MCNC: 10.3 MG/DL (ref 8.6–10.4)
CHLORIDE SERPL-SCNC: 105 MMOL/L (ref 98–107)
CO2 SERPL-SCNC: 21 MMOL/L (ref 20–31)
CREAT SERPL-MCNC: 1.9 MG/DL (ref 0.7–1.2)
EOSINOPHIL # BLD: 0.24 K/UL (ref 0–0.44)
EOSINOPHILS RELATIVE PERCENT: 3 % (ref 1–4)
ERYTHROCYTE [DISTWIDTH] IN BLOOD BY AUTOMATED COUNT: 13.3 % (ref 11.8–14.4)
GFR SERPL CREATININE-BSD FRML MDRD: 34 ML/MIN/1.73M2
GLUCOSE SERPL-MCNC: 78 MG/DL (ref 70–99)
HCT VFR BLD AUTO: 43.9 % (ref 40.7–50.3)
HGB BLD-MCNC: 14.2 G/DL (ref 13–17)
IMM GRANULOCYTES # BLD AUTO: 0.04 K/UL (ref 0–0.3)
IMM GRANULOCYTES NFR BLD: 1 %
LYMPHOCYTES NFR BLD: 1.91 K/UL (ref 1.1–3.7)
LYMPHOCYTES RELATIVE PERCENT: 24 % (ref 24–43)
MCH RBC QN AUTO: 28 PG (ref 25.2–33.5)
MCHC RBC AUTO-ENTMCNC: 32.3 G/DL (ref 28.4–34.8)
MCV RBC AUTO: 86.6 FL (ref 82.6–102.9)
MONOCYTES NFR BLD: 0.49 K/UL (ref 0.1–1.2)
MONOCYTES NFR BLD: 6 % (ref 3–12)
NEUTROPHILS NFR BLD: 65 % (ref 36–65)
NEUTS SEG NFR BLD: 5.2 K/UL (ref 1.5–8.1)
NRBC BLD-RTO: 0 PER 100 WBC
PLATELET # BLD AUTO: 251 K/UL (ref 138–453)
PMV BLD AUTO: 10.9 FL (ref 8.1–13.5)
POTASSIUM SERPL-SCNC: 4.1 MMOL/L (ref 3.7–5.3)
RBC # BLD AUTO: 5.07 M/UL (ref 4.21–5.77)
SODIUM SERPL-SCNC: 138 MMOL/L (ref 135–144)
WBC OTHER # BLD: 7.9 K/UL (ref 3.5–11.3)

## 2023-09-14 PROCEDURE — 36415 COLL VENOUS BLD VENIPUNCTURE: CPT

## 2023-09-14 PROCEDURE — C1894 INTRO/SHEATH, NON-LASER: HCPCS | Performed by: FAMILY MEDICINE

## 2023-09-14 PROCEDURE — 99153 MOD SED SAME PHYS/QHP EA: CPT | Performed by: FAMILY MEDICINE

## 2023-09-14 PROCEDURE — 2500000003 HC RX 250 WO HCPCS: Performed by: FAMILY MEDICINE

## 2023-09-14 PROCEDURE — 6360000004 HC RX CONTRAST MEDICATION: Performed by: FAMILY MEDICINE

## 2023-09-14 PROCEDURE — 2709999900 HC NON-CHARGEABLE SUPPLY: Performed by: FAMILY MEDICINE

## 2023-09-14 PROCEDURE — 7100000010 HC PHASE II RECOVERY - FIRST 15 MIN: Performed by: FAMILY MEDICINE

## 2023-09-14 PROCEDURE — 7100000011 HC PHASE II RECOVERY - ADDTL 15 MIN: Performed by: FAMILY MEDICINE

## 2023-09-14 PROCEDURE — 85025 COMPLETE CBC W/AUTO DIFF WBC: CPT

## 2023-09-14 PROCEDURE — 2500000003 HC RX 250 WO HCPCS

## 2023-09-14 PROCEDURE — 99152 MOD SED SAME PHYS/QHP 5/>YRS: CPT | Performed by: FAMILY MEDICINE

## 2023-09-14 PROCEDURE — 6360000002 HC RX W HCPCS: Performed by: FAMILY MEDICINE

## 2023-09-14 PROCEDURE — 93458 L HRT ARTERY/VENTRICLE ANGIO: CPT | Performed by: FAMILY MEDICINE

## 2023-09-14 PROCEDURE — 6360000002 HC RX W HCPCS

## 2023-09-14 PROCEDURE — 80048 BASIC METABOLIC PNL TOTAL CA: CPT

## 2023-09-14 PROCEDURE — C1769 GUIDE WIRE: HCPCS | Performed by: FAMILY MEDICINE

## 2023-09-14 RX ORDER — SODIUM CHLORIDE 9 MG/ML
INJECTION, SOLUTION INTRAVENOUS CONTINUOUS
Status: DISCONTINUED | OUTPATIENT
Start: 2023-09-14 | End: 2023-09-14 | Stop reason: HOSPADM

## 2023-09-14 RX ORDER — DIPHENHYDRAMINE HCL 25 MG
50 CAPSULE ORAL ONCE
Status: DISCONTINUED | OUTPATIENT
Start: 2023-09-14 | End: 2023-09-14 | Stop reason: HOSPADM

## 2023-09-14 RX ORDER — NITROGLYCERIN 0.4 MG/1
0.4 TABLET SUBLINGUAL EVERY 5 MIN PRN
Status: DISCONTINUED | OUTPATIENT
Start: 2023-09-14 | End: 2023-09-14 | Stop reason: HOSPADM

## 2023-09-14 RX ORDER — HEPARIN SODIUM 1000 [USP'U]/ML
INJECTION, SOLUTION INTRAVENOUS; SUBCUTANEOUS PRN
Status: DISCONTINUED | OUTPATIENT
Start: 2023-09-14 | End: 2023-09-14 | Stop reason: HOSPADM

## 2023-09-14 RX ORDER — NITROGLYCERIN 20 MG/100ML
INJECTION INTRAVENOUS PRN
Status: DISCONTINUED | OUTPATIENT
Start: 2023-09-14 | End: 2023-09-14 | Stop reason: HOSPADM

## 2023-09-14 RX ORDER — SODIUM CHLORIDE 9 MG/ML
INJECTION, SOLUTION INTRAVENOUS PRN
Status: DISCONTINUED | OUTPATIENT
Start: 2023-09-14 | End: 2023-09-14 | Stop reason: HOSPADM

## 2023-09-14 RX ORDER — SODIUM CHLORIDE 0.9 % (FLUSH) 0.9 %
5-40 SYRINGE (ML) INJECTION PRN
Status: DISCONTINUED | OUTPATIENT
Start: 2023-09-14 | End: 2023-09-14 | Stop reason: HOSPADM

## 2023-09-14 RX ORDER — SODIUM CHLORIDE 0.9 % (FLUSH) 0.9 %
5-40 SYRINGE (ML) INJECTION EVERY 12 HOURS SCHEDULED
Status: DISCONTINUED | OUTPATIENT
Start: 2023-09-14 | End: 2023-09-14 | Stop reason: HOSPADM

## 2023-09-14 RX ORDER — ALIROCUMAB 75 MG/ML
150 INJECTION, SOLUTION SUBCUTANEOUS
Qty: 3 ADJUSTABLE DOSE PRE-FILLED PEN SYRINGE | Refills: 3
Start: 2023-09-14

## 2023-09-14 RX ORDER — MIDAZOLAM HYDROCHLORIDE 1 MG/ML
INJECTION INTRAMUSCULAR; INTRAVENOUS PRN
Status: DISCONTINUED | OUTPATIENT
Start: 2023-09-14 | End: 2023-09-14 | Stop reason: HOSPADM

## 2023-09-14 RX ORDER — LIDOCAINE HYDROCHLORIDE 10 MG/ML
INJECTION, SOLUTION INFILTRATION; PERINEURAL PRN
Status: DISCONTINUED | OUTPATIENT
Start: 2023-09-14 | End: 2023-09-14 | Stop reason: HOSPADM

## 2023-09-14 RX ORDER — ACETAMINOPHEN 325 MG/1
650 TABLET ORAL EVERY 4 HOURS PRN
Status: DISCONTINUED | OUTPATIENT
Start: 2023-09-14 | End: 2023-09-14 | Stop reason: HOSPADM

## 2023-09-14 NOTE — PROGRESS NOTES
IV Sedation Discharge Criteria    Inpatients must meet Criteria 1 through 7. All other patients are either YES or N/A. If a NO is chosen then Surgeon must be notified. 1.  Minimum 30 minutes after last dose of sedative medication, minimum 120 minutes after last dose of reversal agent. Yes      2. Systolic BP stable within 20 mmHg for 30 minutes & systolic BP between 90 & 314 or within 10 mmHg of baseline. Yes      3. Pulse between 60 and 100 or within 10 bpm of baseline. Yes      4. Spontaneous respiratory rate >/= 10 per minute. Yes      5. SaO2 >/= 95 or  >/= baseline. Yes      6. Able to cough and swallow or return to baseline function. Yes      7. Alert and oriented or return to baseline mental status. Yes      8. Demonstrates controlled, coordinated movements, ambulates with steady gait, or return to baseline activity function. Yes      9. Minimal or no pain or nausea, or at a level tolerable and acceptable to patient. Yes      10. Takes and retains oral fluids as allowed. Yes      11. Procedural / perioperative site stable. Minimal or no bleeding. Yes          12. If GI endoscopy procedure, minimal or no abdominal distention or passing flatus. N/A      13. Written discharge instructions and emergency telephone number provided. Yes      14. Accompanied by a responsible adult.     Yes

## 2023-09-14 NOTE — DISCHARGE INSTRUCTIONS
Discharge Instructions for Cardiac Catheterization    A cardiac catheterization is a diagnostic test used to evaluate the health of the heart and its blood vessels. The test is done with a thin catheter carefully threaded into your heart from a leg or arm artery. Most likely, you will be allowed to go home the same day as the procedure. Steps to Take at Home:   Pain- apply ice to site 15-20 minutes every hour for the first 2 days. Showering is okay 24 hours after procedure. No soaking in a pool, hot tub, bath tub, or standing water for one week. Bleeding (outward or under the skin-hematoma)- apply firm pressure for 10-15 minutes or until the bleeding stops, then call your doctor. If unable to get bleeding stopped, call 911. Kidney damage- Call if you urinate less than normal, have swelling or feel puffy, and/or gain 2 or more pounds over night in the first week. If procedure was in ARM:  You were instructed to keep wrist straight and still for two hours after the procedure. The arm and hand may now be used for normal daily activities except, avoid using the heal of hand while getting up and down from furniture for the first few days. Keep affected arm elevated, hand higher than elbow, while pressure dressing in place to decrease swelling. 1)  Gauze and Elastoplast   Remove in 4 hours as follows:     TIME:____9:30 PM____________  Remove 1 piece of tape at a time, waiting 15 -20 minutes between layers to monitor for bleeding. If dressing sticks, place wrist under cool running water to help loosen gauze from site then pat site dry. *** If hand feels numb, tingly, and/or cold- loosen first 1-2 layers of tape if dressing still in place. If no relief noticed, remove pressure dressing as per above instructions. Seek medical help if no relief or if dressing already off. Diet   Drink plenty of fluids after the test to flush the x-ray dye from your system. Return to your normal diet.    No alcoholic beverages for 24 hours after the procedure. Physical Activity   The sedative will make you sleepy. Rest until the effects have worn off. Nausea and vomiting from the sedative is normal and usually does not last long. Ask your doctor when you will be able to return to work. Do not drive, operate machinery, do anything that requires attention to detail, or sign important papers for at least 24 hours or until your doctor says it is safe. Avoid heavy lifting, physically demanding activities, and sexual activity for 2-3 days. Lift nothing over 10 pounds (a gallon of milk is 8 pounds). Do not sit for long periods of time. Try to change positions frequently. Medications   Resume taking your normal medicines as advised. Use acetaminophen (Tylenol) for pain relief. (Avoid anti-inflammatory drugs such as- ibuprofen (Advil, Motrin), naproxen sodium (Aleve), Excedrin for a few days)  If you had to stop taking these medications before the procedure, ask your doctor when you can resume taking them:   Anti-inflammatory drugs   Blood thinners, such as warfarin (Coumadin)   If you are taking medicines, follow these general guidelines:   Take your medicine as directed. Do not change the amount or the schedule. Do not stop taking them without talking to your doctor. Do not share them. Know the side effects and report any to your doctor. Some drugs can be dangerous when mixed. Talk to a doctor or pharmacist if you are taking more than one drug. This includes over-the-counter medicine and herb or dietary supplements. Plan ahead for refills so you don't run out. Follow-up   The test results are available right after the procedure. At that point, the doctor will discuss the findings and suggest appropriate treatment options. In some cases, the results can indicate an immediate need for surgery. Schedule a follow-up appointment as directed by your doctor.    Call Your Doctor If Any of the Following Occurs   Signs

## 2023-09-14 NOTE — PROGRESS NOTES
All discharge instructions given. All questions answered at this time. All belongings returned. Patient left department in wheelchair with wife at side.

## 2023-09-16 DIAGNOSIS — I25.10 ASHD (ARTERIOSCLEROTIC HEART DISEASE): ICD-10-CM

## 2023-09-16 DIAGNOSIS — I10 ESSENTIAL HYPERTENSION: ICD-10-CM

## 2023-09-16 DIAGNOSIS — Z78.9 STATIN INTOLERANCE: ICD-10-CM

## 2023-09-16 DIAGNOSIS — R06.02 SOB (SHORTNESS OF BREATH): ICD-10-CM

## 2023-09-16 DIAGNOSIS — N18.32 STAGE 3B CHRONIC KIDNEY DISEASE (HCC): ICD-10-CM

## 2023-09-16 DIAGNOSIS — G45.9 TIA (TRANSIENT ISCHEMIC ATTACK): ICD-10-CM

## 2023-09-16 DIAGNOSIS — R53.83 OTHER FATIGUE: ICD-10-CM

## 2023-09-16 DIAGNOSIS — E78.2 MIXED HYPERLIPIDEMIA: ICD-10-CM

## 2023-09-16 DIAGNOSIS — R42 DIZZINESS AND GIDDINESS: ICD-10-CM

## 2023-09-16 DIAGNOSIS — R07.89 ATYPICAL CHEST PAIN: ICD-10-CM

## 2023-09-16 DIAGNOSIS — R53.83 LACK OF ENERGY: ICD-10-CM

## 2023-09-18 RX ORDER — ISOSORBIDE MONONITRATE 30 MG/1
30 TABLET, EXTENDED RELEASE ORAL DAILY
Qty: 90 TABLET | Refills: 3 | Status: SHIPPED | OUTPATIENT
Start: 2023-09-18

## 2023-09-21 NOTE — TELEPHONE ENCOUNTER
----- Message from Rosaura Lazo MD sent at 7/8/2020 10:50 PM EDT -----  Blood count is good. Kidney function stable. Cholesterol is markedly uncontrolled, please make sure he is taking his Lipitor. It was much better 10 months ago. Continue current therapy as discussed in the office yesterday. Pending Holter monitor result. We will call him as soon as the result becomes available.   Thank you
lvm to return my call
190.4

## 2023-09-29 ENCOUNTER — OFFICE VISIT (OUTPATIENT)
Dept: CARDIOLOGY | Age: 88
End: 2023-09-29
Payer: MEDICARE

## 2023-09-29 VITALS
WEIGHT: 149 LBS | HEART RATE: 77 BPM | OXYGEN SATURATION: 99 % | HEIGHT: 67 IN | RESPIRATION RATE: 18 BRPM | SYSTOLIC BLOOD PRESSURE: 117 MMHG | BODY MASS INDEX: 23.39 KG/M2 | DIASTOLIC BLOOD PRESSURE: 63 MMHG

## 2023-09-29 DIAGNOSIS — I10 ESSENTIAL HYPERTENSION: ICD-10-CM

## 2023-09-29 DIAGNOSIS — E78.2 MIXED HYPERLIPIDEMIA: ICD-10-CM

## 2023-09-29 DIAGNOSIS — R42 DIZZINESS AND GIDDINESS: ICD-10-CM

## 2023-09-29 DIAGNOSIS — I25.10 ASHD (ARTERIOSCLEROTIC HEART DISEASE): Primary | ICD-10-CM

## 2023-09-29 DIAGNOSIS — G45.9 TIA (TRANSIENT ISCHEMIC ATTACK): ICD-10-CM

## 2023-09-29 DIAGNOSIS — Z78.9 STATIN INTOLERANCE: ICD-10-CM

## 2023-09-29 PROCEDURE — G8427 DOCREV CUR MEDS BY ELIG CLIN: HCPCS | Performed by: INTERNAL MEDICINE

## 2023-09-29 PROCEDURE — 99214 OFFICE O/P EST MOD 30 MIN: CPT | Performed by: INTERNAL MEDICINE

## 2023-09-29 PROCEDURE — 1036F TOBACCO NON-USER: CPT | Performed by: INTERNAL MEDICINE

## 2023-09-29 PROCEDURE — 99211 OFF/OP EST MAY X REQ PHY/QHP: CPT | Performed by: INTERNAL MEDICINE

## 2023-09-29 PROCEDURE — G8420 CALC BMI NORM PARAMETERS: HCPCS | Performed by: INTERNAL MEDICINE

## 2023-09-29 PROCEDURE — 1123F ACP DISCUSS/DSCN MKR DOCD: CPT | Performed by: INTERNAL MEDICINE

## 2023-09-29 NOTE — PROGRESS NOTES
Patent stents, otherwise nonobstructive CAD. Medical therapy. Antiplatelet Agent: Not indicated clopidogrel (Plavix) 75 mg daily. He says he has stopped his aspirin on his own. Beta Blocker Therapy: Continue metoprolol succinate (Toprol XL)   12.5 mg daily  Calcium Channel Blocker: Continue amlodipine (Norvasc) 5 mg daily. Other Anti-anginal medications: ContinueNitroglycerin as needed. Continue Repatha  Counseled patient extensively to come to the emergency room if he has worsening shortness of breath or chest pain and this can be life-threatening. Transient ischemic attack 4/8/2021 his CTA of the head and neck and MRI of the brain. 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 Antiplatelet Agent: Continue clopidogrel (Plavix): Plavix 75 mg daily. Continue Repatha  Currently has no neurological deficit complaining of chronic right leg weakness. He is using his cane for ambulation and refusing to use walker. Continue follow-up with neurology     Shortness of breath with almost any exertion: he has worsening shortness of breath since April. His echo is ordered and going to be scheduled for next week. Additional Testing List: None  Additional Testing List: Additional Testing List: I took the liberty of ordering a BMP for today to assess their potassium and renal function. I told them that they could get their lab work performed at the location of their choosing, unfortunately, if the lab work was not performed at a [facility]) facility I could not guarantee my ability to follow up with them on their results. and  I took the liberty of ordering a CBC. I told them that they could get their lab work performed at the location of their choosing, unfortunately, if the lab work was not performed at a [facility]) facility I could not guarantee my ability to follow up with them on their results.       Lightheadedness/dizziness: Caused from Vertigo-  His has

## 2023-09-29 NOTE — PATIENT INSTRUCTIONS
SURVEY:    You may be receiving a survey from [employer] regarding your visit today. Please complete the survey to enable us to provide the highest quality of care to you and your family. If you cannot score us a very good on any question, please call the office to discuss how we could have made your experience a very good one. Thank you.

## 2023-10-05 ENCOUNTER — HOSPITAL ENCOUNTER (OUTPATIENT)
Age: 88
Discharge: HOME | End: 2023-10-07
Attending: INTERNAL MEDICINE
Payer: MEDICARE

## 2023-10-05 VITALS
SYSTOLIC BLOOD PRESSURE: 117 MMHG | WEIGHT: 149 LBS | BODY MASS INDEX: 23.39 KG/M2 | DIASTOLIC BLOOD PRESSURE: 63 MMHG | HEIGHT: 67 IN

## 2023-10-05 DIAGNOSIS — I50.22 CHRONIC SYSTOLIC (CONGESTIVE) HEART FAILURE (HCC): ICD-10-CM

## 2023-10-05 DIAGNOSIS — I20.8 CHRONIC STABLE ANGINA: ICD-10-CM

## 2023-10-05 DIAGNOSIS — I25.10 ASHD (ARTERIOSCLEROTIC HEART DISEASE): ICD-10-CM

## 2023-10-05 DIAGNOSIS — R06.02 SOB (SHORTNESS OF BREATH): ICD-10-CM

## 2023-10-05 DIAGNOSIS — R94.31 ABNORMAL ECG: ICD-10-CM

## 2023-10-05 DIAGNOSIS — I50.32 CHRONIC DIASTOLIC CHF (CONGESTIVE HEART FAILURE), NYHA CLASS 3 (HCC): ICD-10-CM

## 2023-10-05 LAB
ECHO AO ROOT DIAM: 3.2 CM
ECHO AO ROOT INDEX: 1.8 CM/M2
ECHO AV ACCELERATION TIME: 88.4 MS
ECHO AV CUSP MM: 1.4 CM
ECHO AV MEAN GRADIENT: 2 MMHG
ECHO AV MEAN VELOCITY: 0.7 M/S
ECHO AV PEAK VELOCITY: 1 M/S
ECHO AV VTI: 19.6 CM
ECHO BSA: 1.79 M2
ECHO LA DIAMETER INDEX: 2.02 CM/M2
ECHO LA DIAMETER: 3.6 CM
ECHO LA MAJOR AXIS: 5.4 CM
ECHO LA TO AORTIC ROOT RATIO: 1.13
ECHO LA VOL 4C: 45 ML (ref 18–58)
ECHO LA VOLUME INDEX A4C: 25 ML/M2 (ref 16–34)
ECHO LV E' LATERAL VELOCITY: 8 CM/S
ECHO LV EDV A4C: 45 ML
ECHO LV EDV INDEX A4C: 25 ML/M2
ECHO LV EJECTION FRACTION BIPLANE: 56 % (ref 55–100)
ECHO LV ESV A4C: 18 ML
ECHO LV ESV INDEX A4C: 10 ML/M2
ECHO LV FRACTIONAL SHORTENING: 17 % (ref 28–44)
ECHO LV INTERNAL DIMENSION DIASTOLE INDEX: 1.97 CM/M2
ECHO LV INTERNAL DIMENSION DIASTOLIC: 3.5 CM (ref 4.2–5.9)
ECHO LV INTERNAL DIMENSION SYSTOLIC INDEX: 1.63 CM/M2
ECHO LV INTERNAL DIMENSION SYSTOLIC: 2.9 CM
ECHO LV IVSD: 1.5 CM (ref 0.6–1)
ECHO LV IVSS: 1.8 CM
ECHO LV MASS 2D: 135.8 G (ref 88–224)
ECHO LV MASS INDEX 2D: 76.3 G/M2 (ref 49–115)
ECHO LV POSTERIOR WALL DIASTOLIC: 0.9 CM (ref 0.6–1)
ECHO LV POSTERIOR WALL SYSTOLIC: 1.2 CM
ECHO LV RELATIVE WALL THICKNESS RATIO: 0.51
ECHO LVOT AV VTI INDEX: 0.91
ECHO LVOT MEAN GRADIENT: 1 MMHG
ECHO LVOT VTI: 17.9 CM
ECHO MV A VELOCITY: 0.44 M/S
ECHO MV E DECELERATION TIME (DT): 173.1 MS
ECHO MV E VELOCITY: 0.95 M/S
ECHO MV E/A RATIO: 2.16
ECHO MV E/E' LATERAL: 11.88
ECHO PV MAX VELOCITY: 0.7 M/S

## 2023-10-05 PROCEDURE — 93306 TTE W/DOPPLER COMPLETE: CPT

## 2023-10-06 ENCOUNTER — TELEPHONE (OUTPATIENT)
Dept: CARDIOLOGY | Age: 88
End: 2023-10-06

## 2023-10-06 NOTE — TELEPHONE ENCOUNTER
----- Message from Jahaira Johnson MD sent at 10/6/2023  2:05 PM EDT -----  Echo is okay. Continue current therapy and follow-up. Please call with questions and/or concerns.   Thank you

## 2024-02-13 ENCOUNTER — TELEPHONE (OUTPATIENT)
Dept: CARDIOLOGY | Age: 89
End: 2024-02-13

## 2024-02-13 RX ORDER — METOPROLOL SUCCINATE 25 MG/1
12.5 TABLET, EXTENDED RELEASE ORAL DAILY
Qty: 90 TABLET | Refills: 3 | Status: SHIPPED | OUTPATIENT
Start: 2024-02-13

## 2024-03-18 RX ORDER — AMLODIPINE BESYLATE 5 MG/1
5 TABLET ORAL DAILY
Qty: 90 TABLET | Refills: 3 | Status: SHIPPED | OUTPATIENT
Start: 2024-03-18

## 2024-03-26 ENCOUNTER — HOSPITAL ENCOUNTER (OUTPATIENT)
Age: 89
Discharge: HOME | End: 2024-03-28
Payer: MEDICARE

## 2024-03-26 ENCOUNTER — OFFICE VISIT (OUTPATIENT)
Dept: CARDIOLOGY | Age: 89
End: 2024-03-26
Payer: MEDICARE

## 2024-03-26 VITALS
SYSTOLIC BLOOD PRESSURE: 152 MMHG | HEIGHT: 67 IN | RESPIRATION RATE: 18 BRPM | WEIGHT: 147 LBS | OXYGEN SATURATION: 98 % | HEART RATE: 68 BPM | BODY MASS INDEX: 23.07 KG/M2 | DIASTOLIC BLOOD PRESSURE: 72 MMHG

## 2024-03-26 DIAGNOSIS — G45.9 TIA (TRANSIENT ISCHEMIC ATTACK): ICD-10-CM

## 2024-03-26 DIAGNOSIS — I10 ESSENTIAL HYPERTENSION: ICD-10-CM

## 2024-03-26 DIAGNOSIS — I25.10 ASHD (ARTERIOSCLEROTIC HEART DISEASE): ICD-10-CM

## 2024-03-26 DIAGNOSIS — Z78.9 STATIN INTOLERANCE: ICD-10-CM

## 2024-03-26 DIAGNOSIS — R42 DIZZINESS AND GIDDINESS: ICD-10-CM

## 2024-03-26 DIAGNOSIS — R06.02 SOB (SHORTNESS OF BREATH): ICD-10-CM

## 2024-03-26 DIAGNOSIS — I25.10 ASHD (ARTERIOSCLEROTIC HEART DISEASE): Primary | ICD-10-CM

## 2024-03-26 DIAGNOSIS — E78.2 MIXED HYPERLIPIDEMIA: ICD-10-CM

## 2024-03-26 LAB — ECHO BSA: 1.78 M2

## 2024-03-26 PROCEDURE — G8420 CALC BMI NORM PARAMETERS: HCPCS | Performed by: PHYSICIAN ASSISTANT

## 2024-03-26 PROCEDURE — 1123F ACP DISCUSS/DSCN MKR DOCD: CPT | Performed by: PHYSICIAN ASSISTANT

## 2024-03-26 PROCEDURE — 99214 OFFICE O/P EST MOD 30 MIN: CPT | Performed by: PHYSICIAN ASSISTANT

## 2024-03-26 PROCEDURE — 93243 EXT ECG>48HR<7D SCAN A/R: CPT

## 2024-03-26 PROCEDURE — 1036F TOBACCO NON-USER: CPT | Performed by: PHYSICIAN ASSISTANT

## 2024-03-26 PROCEDURE — G8484 FLU IMMUNIZE NO ADMIN: HCPCS | Performed by: PHYSICIAN ASSISTANT

## 2024-03-26 PROCEDURE — 99211 OFF/OP EST MAY X REQ PHY/QHP: CPT | Performed by: PHYSICIAN ASSISTANT

## 2024-03-26 PROCEDURE — G8427 DOCREV CUR MEDS BY ELIG CLIN: HCPCS | Performed by: PHYSICIAN ASSISTANT

## 2024-03-26 NOTE — PROGRESS NOTES
tract obstruction.  No significant valvular abnormalities. Evidence of mild (grade I) diastolic dysfunction is seen    Cardiac cath done on 11/2/2021 showed Severe single vessel disease involving a small to moderate sized PL branch of the RCA with moderate to severe disease in the PDA and D2 branch of the LAD. Normal left ventricular end diastolic pressure    Successful PTCA -OMAR mid LAD (Distal segment), Successful PTCA - OMAR PL branch / RCA by Dr Cunha on 11/8/2021    EKG done in office 9/6/2022- sinus rhythm, first degree AV block.    Echo done on 10/24/2022- EF >55% The left ventricular cavity size is within normal limits and and severe left ventricular septal hypertrophy without evidence of a significant resting or valsalva induced outflow tract gradient. Normal mitral valve structure with mild mitral regurgitation. Evidence of moderate diastolic dysfunction is seen. Compared to the previous study of 11/1/21, no significant change was seen.  No clear cardiac cause of a TIA was identified from this study. However, if suspicion for an embolic intracardiac etiology is high, consider additional testing by [facility]ICK, especially if this might .    CAM monitor done on 10/24/2022    EKG done in office 9/11/23: Normal sinus rhythm, inferior infarct age undetermined, abnormal ECG.     Cardiac cath on 9/14/2023: Nonobstructive CAD, medical therapy.    Mr. Ortiz is here today for a 6 month follow up. He reports he is doing okay. He does have a lot of vertigo/light headedness and that makes things a little fuzzy to him and makes him feel like he might fall at times. He does use a cane to ambulate. He has not recently fallen due to this. He denies any passing out episodes. He has gone to physical therapy in the past for vertigo. He reports there are days he just lays in bed. He reports his head feels foggy. No chest pain, pressure, or tightness. No shortness of breath or palpitations. No blood in his urine or

## 2024-03-26 NOTE — PATIENT INSTRUCTIONS
Check blood pressure every day for one week and then call us with those readings in one week.       SURVEY:    You may be receiving a survey from [employer] regarding your visit today.    Please complete the survey to enable us to provide the highest quality of care to you and your family.    If you cannot score us a very good on any question, please call the office to discuss how we could have made your experience a very good one.    Thank you.

## 2024-04-01 LAB — ECHO BSA: 1.78 M2

## 2024-04-02 ENCOUNTER — TELEPHONE (OUTPATIENT)
Dept: CARDIOLOGY | Age: 89
End: 2024-04-02

## 2024-04-02 NOTE — TELEPHONE ENCOUNTER
----- Message from Jeri Saavedra PA-C sent at 4/2/2024 11:32 AM EDT -----  Please let them know that their CAM monitor abnormal. Please schedule follow up in the next week or so with  or myself.

## 2024-04-03 DIAGNOSIS — G45.9 TIA (TRANSIENT ISCHEMIC ATTACK): ICD-10-CM

## 2024-04-04 RX ORDER — CLOPIDOGREL BISULFATE 75 MG/1
TABLET ORAL
Qty: 90 TABLET | Refills: 3 | Status: SHIPPED | OUTPATIENT
Start: 2024-04-04

## 2024-04-11 ENCOUNTER — OFFICE VISIT (OUTPATIENT)
Dept: CARDIOLOGY | Age: 89
End: 2024-04-11
Payer: MEDICARE

## 2024-04-11 VITALS
WEIGHT: 148 LBS | HEIGHT: 67 IN | HEART RATE: 67 BPM | RESPIRATION RATE: 18 BRPM | SYSTOLIC BLOOD PRESSURE: 125 MMHG | OXYGEN SATURATION: 99 % | BODY MASS INDEX: 23.23 KG/M2 | DIASTOLIC BLOOD PRESSURE: 67 MMHG

## 2024-04-11 DIAGNOSIS — Z78.9 STATIN INTOLERANCE: ICD-10-CM

## 2024-04-11 DIAGNOSIS — I44.2 INTERMITTENT COMPLETE ATRIOVENTRICULAR BLOCK (HCC): ICD-10-CM

## 2024-04-11 DIAGNOSIS — I10 ESSENTIAL HYPERTENSION: ICD-10-CM

## 2024-04-11 DIAGNOSIS — I25.10 ASHD (ARTERIOSCLEROTIC HEART DISEASE): Primary | ICD-10-CM

## 2024-04-11 DIAGNOSIS — G45.9 TIA (TRANSIENT ISCHEMIC ATTACK): ICD-10-CM

## 2024-04-11 DIAGNOSIS — R94.31 ABNORMAL HOLTER EXAM: ICD-10-CM

## 2024-04-11 DIAGNOSIS — E78.2 MIXED HYPERLIPIDEMIA: ICD-10-CM

## 2024-04-11 DIAGNOSIS — I47.29 NSVT (NONSUSTAINED VENTRICULAR TACHYCARDIA) (HCC): ICD-10-CM

## 2024-04-11 DIAGNOSIS — R42 LIGHT HEADEDNESS: ICD-10-CM

## 2024-04-11 PROCEDURE — G8420 CALC BMI NORM PARAMETERS: HCPCS | Performed by: INTERNAL MEDICINE

## 2024-04-11 PROCEDURE — G8427 DOCREV CUR MEDS BY ELIG CLIN: HCPCS | Performed by: INTERNAL MEDICINE

## 2024-04-11 PROCEDURE — 1123F ACP DISCUSS/DSCN MKR DOCD: CPT | Performed by: INTERNAL MEDICINE

## 2024-04-11 PROCEDURE — 1036F TOBACCO NON-USER: CPT | Performed by: INTERNAL MEDICINE

## 2024-04-11 PROCEDURE — 99214 OFFICE O/P EST MOD 30 MIN: CPT | Performed by: INTERNAL MEDICINE

## 2024-04-11 PROCEDURE — 99211 OFF/OP EST MAY X REQ PHY/QHP: CPT | Performed by: INTERNAL MEDICINE

## 2024-04-11 RX ORDER — CARVEDILOL 3.12 MG/1
3.12 TABLET ORAL 2 TIMES DAILY
Qty: 60 TABLET | Refills: 0 | Status: SHIPPED | OUTPATIENT
Start: 2024-04-11

## 2024-04-11 RX ORDER — CARVEDILOL 6.25 MG/1
6.25 TABLET ORAL 2 TIMES DAILY
Qty: 180 TABLET | Refills: 0 | Status: CANCELLED | OUTPATIENT
Start: 2024-04-11

## 2024-04-11 RX ORDER — AMIODARONE HYDROCHLORIDE 200 MG/1
200 TABLET ORAL DAILY
Qty: 90 TABLET | Refills: 3 | Status: SHIPPED | OUTPATIENT
Start: 2024-04-11

## 2024-04-16 ENCOUNTER — TELEPHONE (OUTPATIENT)
Dept: CARDIOLOGY | Age: 89
End: 2024-04-16

## 2024-04-16 ENCOUNTER — TELEPHONE (OUTPATIENT)
Dept: CARDIOLOGY CLINIC | Age: 89
End: 2024-04-16

## 2024-04-16 DIAGNOSIS — I25.10 ASHD (ARTERIOSCLEROTIC HEART DISEASE): Primary | ICD-10-CM

## 2024-04-16 DIAGNOSIS — G45.9 TIA (TRANSIENT ISCHEMIC ATTACK): ICD-10-CM

## 2024-04-16 NOTE — TELEPHONE ENCOUNTER
Pt had appointment scheduled with  on 4/18 but he will be on vacation this week and next.  would like to refer him to  in [place]. Referral placed/faxed. Lm for pt to return my call.

## 2024-04-16 NOTE — TELEPHONE ENCOUNTER
Ref recvd from [place] [facility]  Looks like patient has not silvana notified of the referral yet-  this is on a separate encounter, Will follow- will call patient to schedule once he is aware of the referral .           Seen Dr. Dorsey 04.11.2024  Event monitor-  03.26.2024

## 2024-04-18 NOTE — TELEPHONE ENCOUNTER
No recent HIPAA on file   Wife is listed at Emergency Contact -  Lidia-     Spoke with the patient     Patient is going to the PCP today -  he is having some head and chest congestion.  He is getting an antibiotic.     Patient is scheduled for OV with Amrit- 04.24.2024 at 330pm   Verbalized understanding that this is for an OV only.

## 2024-04-19 ENCOUNTER — TELEPHONE (OUTPATIENT)
Dept: PREADMISSION TESTING | Age: 89
End: 2024-04-19

## 2024-04-23 NOTE — PATIENT INSTRUCTIONS
You may receive a survey regarding the care you received during your visit.  Your input is valuable to us.  We encourage you to complete and return your survey.  We hope you will choose us in the future for your healthcare needs.    Thank you for choosing [facility]!    Your Medical Assistant Today:  Eileen IBANEZ      Your RN Today:  Ange IBANEZ  Your Provider for Today: Dr. Marcus  Ph. [phone]     Have a Great Day!        Please call the office when you are ready to schedule the dual chamber pacemaker- MDT.

## 2024-04-24 ENCOUNTER — OFFICE VISIT (OUTPATIENT)
Dept: CARDIOLOGY CLINIC | Age: 89
End: 2024-04-24
Payer: MEDICARE

## 2024-04-24 VITALS
OXYGEN SATURATION: 100 % | WEIGHT: 144.8 LBS | SYSTOLIC BLOOD PRESSURE: 138 MMHG | DIASTOLIC BLOOD PRESSURE: 71 MMHG | HEIGHT: 68 IN | HEART RATE: 63 BPM | BODY MASS INDEX: 21.95 KG/M2

## 2024-04-24 DIAGNOSIS — R94.31 ABNORMAL HOLTER EXAM: Primary | ICD-10-CM

## 2024-04-24 DIAGNOSIS — I47.29 NSVT (NONSUSTAINED VENTRICULAR TACHYCARDIA) (HCC): ICD-10-CM

## 2024-04-24 PROCEDURE — 1036F TOBACCO NON-USER: CPT | Performed by: INTERNAL MEDICINE

## 2024-04-24 PROCEDURE — G8420 CALC BMI NORM PARAMETERS: HCPCS | Performed by: INTERNAL MEDICINE

## 2024-04-24 PROCEDURE — G8427 DOCREV CUR MEDS BY ELIG CLIN: HCPCS | Performed by: INTERNAL MEDICINE

## 2024-04-24 PROCEDURE — 1123F ACP DISCUSS/DSCN MKR DOCD: CPT | Performed by: INTERNAL MEDICINE

## 2024-04-24 PROCEDURE — 93000 ELECTROCARDIOGRAM COMPLETE: CPT | Performed by: INTERNAL MEDICINE

## 2024-04-24 PROCEDURE — 99204 OFFICE O/P NEW MOD 45 MIN: CPT | Performed by: INTERNAL MEDICINE

## 2024-04-24 NOTE — PROGRESS NOTES
non-tender.   EXTREMITIES: No lower extremity edema.   NEUROLOGICAL: Grossly normal.     Laboratory And Diagnostic Data  I have personally reviewed and interpreted the results of the following diagnostic testing    Lab Results   Component Value Date    WBC 7.9 09/14/2023    HGB 14.2 09/14/2023    HCT 43.9 09/14/2023     09/14/2023    CHOL 185 10/19/2021    TRIG 111 10/19/2021    HDL 36 (L) 10/19/2021    ALT 10 07/14/2022    AST 19 07/14/2022     09/14/2023    K 4.1 09/14/2023     09/14/2023    CREATININE 1.9 (H) 09/14/2023    BUN 43 (H) 09/14/2023    CO2 21 09/14/2023    TSH 3.61 09/06/2022    INR 1.1 04/08/2021    LABA1C 5.8 10/19/2021       Echocardiogram LVEF 50-55%, LVWT 0.9 cm, LAD/LIZ 3.6 cm. No significant valvular abnormalities.   ECG sinus rhythm  Coronary angiogram 9/14/2023: Nonobstructive CAD. Patent LAD and RCA stents.   Event Monitor 3/26/24: Sinus rhythm is sinus, average heart rate of 64 bpm (45 and 89 bpm). Mobitz type I second-degree AV block, Mobitz type II second-degree AV block with a longest R-R interval was 2.9 seconds (4:26 PM). PVCs burden 1.2%, Non-sustained VT, longest 11 beats @160 BPM.     Impression:  Recurrent dizziness and falls.  Baseline first-degree AV block.  Intermittent Mobitz type II second-degree AV block with pauses up to 2.9 seconds.  Nonsustained ventricular tachycardia.  Approximator fibrillation.  On amiodarone and apixaban.  CAD/PCI-LAD and RCA     Assessment And Recommendations:  Patient has AV jacob disease.  He has indications for beta blockers and antiarrhythmic therapy.  I think some of his symptoms are definitely caused by bradycardia arrhythmias.  Will benefit with a dual-chamber pacemaker implantation.  Discussed with the patient.  He wants to think before proceeding.  Risk and benefit discussed.  Questions answered.    Thank you for allowing me to participate in the care of your patient. Please call me if you have any questions.      **This

## 2024-04-30 ENCOUNTER — TELEPHONE (OUTPATIENT)
Dept: CARDIOLOGY CLINIC | Age: 89
End: 2024-04-30

## 2024-04-30 NOTE — TELEPHONE ENCOUNTER
Wife called, they are ok with pacemaker implant   Ok to schedule?         Assessment And Recommendations:  Patient has AV jacob disease.  He has indications for beta blockers and antiarrhythmic therapy.  I think some of his symptoms are definitely caused by bradycardia arrhythmias.  Will benefit with a dual-chamber pacemaker implantation.  Discussed with the patient.  He wants to think before proceeding.  Risk and benefit discussed.  Questions answered.     Thank you for allowing me to participate in the care of your patient. Please call me if you have any questions.        **This report has been created using voice recognition software. It may contain minor errors which are inherent in voice recognition technology.**         Electronically signed by Rudy Marcus MD, MRCP, FACC, FHRS on 4/24/2024 at 4:23 PM

## 2024-05-02 NOTE — TELEPHONE ENCOUNTER
Procedure: New dual pacemaker-  MDT  Date:   Arrival Time:   Meds to Hold: Plavix 1day prior    Dr. Marcus-  please see meds do you agree?     Instructions verbalized to the patient.  Instructions mailed to the patient.

## 2024-05-06 PROBLEM — R00.1 BRADYCARDIA: Status: ACTIVE | Noted: 2024-04-30

## 2024-05-22 NOTE — PROGRESS NOTES
Called patient at home, no answer, message left for patient where to go.   NPO after midnight, 12-14 hour fast.   Bring drivers license and insurance information.   Wear comfortable clean clothes.   Shower night before and morning of with liquid antibacterial soap(dial).   Remove Jewelry, leave valuables at home.   You may have to stay overnight, so pack a small bag of essentials. Bring CPAP.  Please bring medications in original bottles.   Follow all instructions given to you by your doctor.   Please notify you doctor if you need to cancel or reschedule.    is needed at discharge.     Heart medications allowed with a sip of water, hold diabetic meds am of procedure.

## 2024-05-23 ENCOUNTER — PREP FOR PROCEDURE (OUTPATIENT)
Dept: CARDIOLOGY | Age: 89
End: 2024-05-23

## 2024-05-23 RX ORDER — SODIUM CHLORIDE 0.9 % (FLUSH) 0.9 %
5-40 SYRINGE (ML) INJECTION EVERY 12 HOURS SCHEDULED
Status: CANCELLED | OUTPATIENT
Start: 2024-05-23

## 2024-05-23 RX ORDER — SODIUM CHLORIDE 0.9 % (FLUSH) 0.9 %
5-40 SYRINGE (ML) INJECTION PRN
Status: CANCELLED | OUTPATIENT
Start: 2024-05-23

## 2024-05-23 RX ORDER — CHLORHEXIDINE GLUCONATE 40 MG/ML
SOLUTION TOPICAL ONCE
Status: CANCELLED | OUTPATIENT
Start: 2024-05-23 | End: 2024-05-23

## 2024-05-23 RX ORDER — SODIUM CHLORIDE 9 MG/ML
INJECTION, SOLUTION INTRAVENOUS PRN
Status: CANCELLED | OUTPATIENT
Start: 2024-05-23

## 2024-05-23 RX ORDER — SODIUM CHLORIDE 9 MG/ML
INJECTION, SOLUTION INTRAVENOUS CONTINUOUS
Status: CANCELLED | OUTPATIENT
Start: 2024-05-23

## 2024-05-24 ENCOUNTER — APPOINTMENT (OUTPATIENT)
Dept: GENERAL RADIOLOGY | Age: 89
End: 2024-05-24
Attending: INTERNAL MEDICINE
Payer: MEDICARE

## 2024-05-24 ENCOUNTER — HOSPITAL ENCOUNTER (OUTPATIENT)
Age: 89
Setting detail: OUTPATIENT SURGERY
Discharge: HOME | End: 2024-05-24
Attending: INTERNAL MEDICINE | Admitting: INTERNAL MEDICINE
Payer: MEDICARE

## 2024-05-24 VITALS
DIASTOLIC BLOOD PRESSURE: 70 MMHG | OXYGEN SATURATION: 97 % | SYSTOLIC BLOOD PRESSURE: 160 MMHG | WEIGHT: 142 LBS | HEIGHT: 68 IN | BODY MASS INDEX: 21.52 KG/M2 | TEMPERATURE: 98 F | HEART RATE: 60 BPM | RESPIRATION RATE: 17 BRPM

## 2024-05-24 DIAGNOSIS — R00.1 BRADYCARDIA: ICD-10-CM

## 2024-05-24 LAB
ABO: NORMAL
ANION GAP SERPL CALC-SCNC: 13 MEQ/L (ref 8–16)
ANTIBODY SCREEN: NORMAL
APTT PPP: 34.6 SECONDS (ref 22–38)
BASOPHILS ABSOLUTE: 0.1 THOU/MM3 (ref 0–0.1)
BASOPHILS NFR BLD AUTO: 0.9 %
BUN SERPL-MCNC: 34 MG/DL (ref 7–22)
CALCIUM SERPL-MCNC: 9.7 MG/DL (ref 8.5–10.5)
CHLORIDE SERPL-SCNC: 107 MEQ/L (ref 98–111)
CO2 SERPL-SCNC: 24 MEQ/L (ref 23–33)
CREAT SERPL-MCNC: 2.1 MG/DL (ref 0.4–1.2)
DEPRECATED RDW RBC AUTO: 48.5 FL (ref 35–45)
ECHO BSA: 1.76 M2
EKG ATRIAL RATE: 60 BPM
EKG ATRIAL RATE: 61 BPM
EKG P-R INTERVAL: 156 MS
EKG P-R INTERVAL: 212 MS
EKG P-R INTERVAL: 276 MS
EKG P-R INTERVAL: 360 MS
EKG Q-T INTERVAL: 416 MS
EKG Q-T INTERVAL: 444 MS
EKG Q-T INTERVAL: 450 MS
EKG Q-T INTERVAL: 454 MS
EKG QRS DURATION: 116 MS
EKG QRS DURATION: 124 MS
EKG QRS DURATION: 124 MS
EKG QRS DURATION: 98 MS
EKG QTC CALCULATION (BAZETT): 418 MS
EKG QTC CALCULATION (BAZETT): 444 MS
EKG QTC CALCULATION (BAZETT): 450 MS
EKG QTC CALCULATION (BAZETT): 454 MS
EKG R AXIS: -62 DEGREES
EKG R AXIS: 85 DEGREES
EKG R AXIS: 88 DEGREES
EKG R AXIS: 90 DEGREES
EKG T AXIS: -67 DEGREES
EKG T AXIS: -80 DEGREES
EKG T AXIS: -87 DEGREES
EKG T AXIS: 70 DEGREES
EKG VENTRICULAR RATE: 60 BPM
EKG VENTRICULAR RATE: 61 BPM
EOSINOPHIL NFR BLD AUTO: 3.3 %
EOSINOPHILS ABSOLUTE: 0.2 THOU/MM3 (ref 0–0.4)
ERYTHROCYTE [DISTWIDTH] IN BLOOD BY AUTOMATED COUNT: 15.6 % (ref 11.5–14.5)
GFR SERPL CREATININE-BSD FRML MDRD: 30 ML/MIN/1.73M2
GLUCOSE SERPL-MCNC: 90 MG/DL (ref 70–108)
HCT VFR BLD AUTO: 43.8 % (ref 42–52)
HGB BLD-MCNC: 14 GM/DL (ref 14–18)
IMM GRANULOCYTES # BLD AUTO: 0.05 THOU/MM3 (ref 0–0.07)
IMM GRANULOCYTES NFR BLD AUTO: 0.7 %
INR PPP: 0.99 (ref 0.85–1.13)
LYMPHOCYTES ABSOLUTE: 1.2 THOU/MM3 (ref 1–4.8)
LYMPHOCYTES NFR BLD AUTO: 17.5 %
MCH RBC QN AUTO: 27.6 PG (ref 26–33)
MCHC RBC AUTO-ENTMCNC: 32 GM/DL (ref 32.2–35.5)
MCV RBC AUTO: 86.2 FL (ref 80–94)
MONOCYTES ABSOLUTE: 0.4 THOU/MM3 (ref 0.4–1.3)
MONOCYTES NFR BLD AUTO: 6.4 %
NEUTROPHILS ABSOLUTE: 5 THOU/MM3 (ref 1.8–7.7)
NEUTROPHILS NFR BLD AUTO: 71.2 %
NRBC BLD AUTO-RTO: 0 /100 WBC
PLATELET # BLD AUTO: 217 THOU/MM3 (ref 130–400)
PMV BLD AUTO: 10.4 FL (ref 9.4–12.4)
POTASSIUM SERPL-SCNC: 4.3 MEQ/L (ref 3.5–5.2)
RBC # BLD AUTO: 5.08 MILL/MM3 (ref 4.7–6.1)
RH FACTOR: NORMAL
SODIUM SERPL-SCNC: 144 MEQ/L (ref 135–145)
WBC # BLD AUTO: 7 THOU/MM3 (ref 4.8–10.8)

## 2024-05-24 PROCEDURE — C1769 GUIDE WIRE: HCPCS | Performed by: INTERNAL MEDICINE

## 2024-05-24 PROCEDURE — 71046 X-RAY EXAM CHEST 2 VIEWS: CPT

## 2024-05-24 PROCEDURE — 7100000010 HC PHASE II RECOVERY - FIRST 15 MIN: Performed by: INTERNAL MEDICINE

## 2024-05-24 PROCEDURE — 86901 BLOOD TYPING SEROLOGIC RH(D): CPT

## 2024-05-24 PROCEDURE — 2580000003 HC RX 258: Performed by: PHYSICIAN ASSISTANT

## 2024-05-24 PROCEDURE — 33208 INSRT HEART PM ATRIAL & VENT: CPT

## 2024-05-24 PROCEDURE — C1894 INTRO/SHEATH, NON-LASER: HCPCS | Performed by: INTERNAL MEDICINE

## 2024-05-24 PROCEDURE — 86900 BLOOD TYPING SEROLOGIC ABO: CPT

## 2024-05-24 PROCEDURE — 2709999900 HC NON-CHARGEABLE SUPPLY: Performed by: INTERNAL MEDICINE

## 2024-05-24 PROCEDURE — 6360000002 HC RX W HCPCS: Performed by: INTERNAL MEDICINE

## 2024-05-24 PROCEDURE — 93010 ELECTROCARDIOGRAM REPORT: CPT | Performed by: INTERNAL MEDICINE

## 2024-05-24 PROCEDURE — 93005 ELECTROCARDIOGRAM TRACING: CPT | Performed by: PHYSICIAN ASSISTANT

## 2024-05-24 PROCEDURE — 99153 MOD SED SAME PHYS/QHP EA: CPT | Performed by: INTERNAL MEDICINE

## 2024-05-24 PROCEDURE — 2500000003 HC RX 250 WO HCPCS: Performed by: INTERNAL MEDICINE

## 2024-05-24 PROCEDURE — 36415 COLL VENOUS BLD VENIPUNCTURE: CPT

## 2024-05-24 PROCEDURE — C1785 PMKR, DUAL, RATE-RESP: HCPCS | Performed by: INTERNAL MEDICINE

## 2024-05-24 PROCEDURE — 85610 PROTHROMBIN TIME: CPT

## 2024-05-24 PROCEDURE — 33208 INSRT HEART PM ATRIAL & VENT: CPT | Performed by: INTERNAL MEDICINE

## 2024-05-24 PROCEDURE — 86850 RBC ANTIBODY SCREEN: CPT

## 2024-05-24 PROCEDURE — C1898 LEAD, PMKR, OTHER THAN TRANS: HCPCS | Performed by: INTERNAL MEDICINE

## 2024-05-24 PROCEDURE — 33206 INSERT HEART PM ATRIAL: CPT | Performed by: INTERNAL MEDICINE

## 2024-05-24 PROCEDURE — 93005 ELECTROCARDIOGRAM TRACING: CPT | Performed by: INTERNAL MEDICINE

## 2024-05-24 PROCEDURE — 6360000002 HC RX W HCPCS: Performed by: PHYSICIAN ASSISTANT

## 2024-05-24 PROCEDURE — 85025 COMPLETE CBC W/AUTO DIFF WBC: CPT

## 2024-05-24 PROCEDURE — 33225 L VENTRIC PACING LEAD ADD-ON: CPT | Performed by: INTERNAL MEDICINE

## 2024-05-24 PROCEDURE — 99152 MOD SED SAME PHYS/QHP 5/>YRS: CPT | Performed by: INTERNAL MEDICINE

## 2024-05-24 PROCEDURE — 7100000011 HC PHASE II RECOVERY - ADDTL 15 MIN: Performed by: INTERNAL MEDICINE

## 2024-05-24 PROCEDURE — 85730 THROMBOPLASTIN TIME PARTIAL: CPT

## 2024-05-24 PROCEDURE — C1892 INTRO/SHEATH,FIXED,PEEL-AWAY: HCPCS | Performed by: INTERNAL MEDICINE

## 2024-05-24 PROCEDURE — 80048 BASIC METABOLIC PNL TOTAL CA: CPT

## 2024-05-24 DEVICE — LEAD 3830 US MKT/ 69CM MRI LBBAP
Type: IMPLANTABLE DEVICE | Status: FUNCTIONAL
Brand: SELECTSECURE™ MRI SURESCAN™

## 2024-05-24 DEVICE — LEAD 5076-52 MRI US RCMCRD
Type: IMPLANTABLE DEVICE | Status: FUNCTIONAL
Brand: CAPSUREFIX NOVUS MRI™ SURESCAN®

## 2024-05-24 DEVICE — IPG W1DR01 AZURE XT DR MRI USA
Type: IMPLANTABLE DEVICE | Status: FUNCTIONAL
Brand: AZURE™ XT DR MRI SURESCAN™

## 2024-05-24 RX ORDER — MIDAZOLAM HYDROCHLORIDE 1 MG/ML
INJECTION INTRAMUSCULAR; INTRAVENOUS PRN
Status: DISCONTINUED | OUTPATIENT
Start: 2024-05-24 | End: 2024-05-24 | Stop reason: HOSPADM

## 2024-05-24 RX ORDER — FENTANYL CITRATE 50 UG/ML
INJECTION, SOLUTION INTRAMUSCULAR; INTRAVENOUS PRN
Status: DISCONTINUED | OUTPATIENT
Start: 2024-05-24 | End: 2024-05-24 | Stop reason: HOSPADM

## 2024-05-24 RX ORDER — SODIUM CHLORIDE 9 MG/ML
INJECTION, SOLUTION INTRAVENOUS PRN
Status: DISCONTINUED | OUTPATIENT
Start: 2024-05-24 | End: 2024-05-24 | Stop reason: HOSPADM

## 2024-05-24 RX ORDER — CHLORHEXIDINE GLUCONATE 40 MG/ML
SOLUTION TOPICAL ONCE
Status: DISCONTINUED | OUTPATIENT
Start: 2024-05-24 | End: 2024-05-24 | Stop reason: HOSPADM

## 2024-05-24 RX ORDER — SODIUM CHLORIDE 0.9 % (FLUSH) 0.9 %
5-40 SYRINGE (ML) INJECTION PRN
Status: DISCONTINUED | OUTPATIENT
Start: 2024-05-24 | End: 2024-05-24 | Stop reason: HOSPADM

## 2024-05-24 RX ORDER — SODIUM CHLORIDE 9 MG/ML
INJECTION, SOLUTION INTRAVENOUS CONTINUOUS
Status: DISCONTINUED | OUTPATIENT
Start: 2024-05-24 | End: 2024-05-24 | Stop reason: HOSPADM

## 2024-05-24 RX ORDER — SODIUM CHLORIDE 0.9 % (FLUSH) 0.9 %
5-40 SYRINGE (ML) INJECTION EVERY 12 HOURS SCHEDULED
Status: DISCONTINUED | OUTPATIENT
Start: 2024-05-24 | End: 2024-05-24 | Stop reason: HOSPADM

## 2024-05-24 RX ADMIN — WATER 2000 MG: 1 INJECTION INTRAMUSCULAR; INTRAVENOUS; SUBCUTANEOUS at 07:52

## 2024-05-24 RX ADMIN — SODIUM CHLORIDE: 9 INJECTION, SOLUTION INTRAVENOUS at 07:47

## 2024-05-24 NOTE — DISCHARGE INSTRUCTIONS
Activity     You should gradually return to your normal activities.     For the first 4 weeks:         Do not lift more than 10 pounds         Do not swim, golf, bowl or vacuum          Do not raise your device side arm above your shoulder for 30 days         At your two-week follow-up visit, ask your doctor which  of the above activities you can resume         ALWAYS avoid any contact sports or hard blows to the chest or abdomen         Avoid sleeping on the left side and face down         Wound care      Remove Safeguard dressing in 24 hours, may remove gauze dressing in 48 hours, leave steri strips on .     Keep your incision dry for at least 7 days, and then you may shower.  Sponge bathes around the device insertion site for the first week, keeping the incision dry.      Do not apply any ointment, talc, or lotion to the incision.      Do not scratch, rub or touch the incision with your hands     The steri-strips (tape strips) will be removed at your follow-up visit, if they have not yet peeled off on their own     Call your doctor immediately if your incision looks re, becomes swollen or tender, or begins to ooze fluid.  Also, notify your doctor at once if you develop A temperature  greater than 100 degrees Fahrenheit.        You must avoid:                Airport magnet security wants                Diathermy or other heart treatments                Arc or resistance welding                Electrical power generator plants                Electric cautery that is used for surgical procedures                Radio or television transmitting towers    Special Precautions:      You must stay at arm's length from magnets of any kind       Cellular phones should be used on the ear opposite to your device.  Do not keep cellular phones in a pocket over your pacemaker.         To avoid any interference with your device, you must pass through any security devices or mullen (airports, department stores, and other public  times.  \"           Loss of fine muscle control or difficulty with your balance especially while walking.  \"           Difficulty focusing or blurred vision.  You may not be aware of slight changes in your behavior and/or your reaction time because of the medication used during the procedure. Therefore you should follow these instructions.  \"           Have someone responsible help you with your care.  \"           Do not drive for 24 hours.  \"           Do not operate equipment for 24 hours (lawnmowers, power tools, kitchen accessories, stove).  \"           Do not drink any alcoholic beverages for a minimum of 24 hours.  \"           Do not make important personal, legal or business decisions for 24 hours.  \"           You may experience dizziness or lightheadedness. Move slowly and carefully, do not make sudden position changes.  \"           Drink extra amounts of fluids today.  \"           Increase your diet as tolerated (unless you have received specific instructions from your doctor).  \"           If you feel nauseated, continue with liquids until the nausea is gone.  \"           Notify your physician if you have not urinated within 8 hours after the procedure.  \"           Resume your medications unless otherwise instructed.     Contact your physician if you have any questions or concerns.     IF YOU REPORT TO AN EMERGENCY ROOM, DOCTOR'S OFFICE OR HOSPITAL WITHIN 24 HOURS AFTER YOUR PROCEDURE, BRING THIS SHEET AND YOUR AFTER VISIT SUMMARY WITH YOU AND GIVE IT TO THE PHYSICIAN OR NURSE ATTENDING YOU.

## 2024-05-24 NOTE — H&P
[facility]  HEART CENTER (EP)  [address] [address]  H&P and SEDATION/ANALGESIA     Patient demographics:  Date:   5/24/2024  Patient name: Cameron Ortiz  YOB: 1935  Sex: male   MRN:   [id number]    Reason for admission or planned procedure:  Dual Chamber pacemaker implantation    Code Status: Full Code    Consent:The risk and benefits of dual chamber pacemaker implantation including pneumothorax, hemothorax, bleeding, vascular complications, infection, pericardial tamponade requiring emergency pericardiocentesis, MI, death, exposure to radiation, lead dislodgement requiring reposition, future operations for generator change or device revision or upgrade were discussed with the patient. He verbalized understanding of the discussion. His questions were answered. The patient wishes to proceed.      Brief clinical summary:  88/M with symptomatic atrial fibrillation, Mobitz type II second-degree AV block with pauses, longest 2.9 seconds while awake), and indication for beta-blocker and anti-arrhythmic drug therapy electively presents for a pacemaker implantation. Normal LVEF. Medical Hx: CAD/PCI-LAD and RCA (3/28/19 and 11/2021) , PAF on amiodarone, HTN, HLD, dizziness, TIA (chronic microvascular disease).     Past Medical History:  Past Medical History:   Diagnosis Date    Abnormal stress test 2017    Arrhythmia     ASHD (arteriosclerotic heart disease)     Cerebral artery occlusion with cerebral infarction (HCC)     TIA    CKD (chronic kidney disease)     Closed traumatic fracture of right femur with routine healing 1955    trauma while in [employer], plate and scews placed and removed    COPD (chronic obstructive pulmonary disease) (HCC)     Elevated PSA     Enlarged prostate     Gallstones     Gout     History of kidney stones     Hx of blood clots     Hx of cardiac cath 11/02/2021    [facility]/Dr. Posadas/Right Radial     Hydrocele in adult

## 2024-05-24 NOTE — BRIEF OP NOTE
Brief Postoperative Note    Date:   5/24/2024  Patient name: Cameron Ortiz  YOB: 1935  Sex: male   MRN:   [id number]    PCP: Jose Luis Cevallos APRN - CNP     Procedure:Dual Chamber pacemaker implantation.    Pre-Op Diagnosis: Intermittent AV block.     Post-Op Diagnosis: Same    Surgeon: Rudy Marcus MD, MRCP, FACC, FHRS    Assistant: Scout LOZA     Anesthesia/sedation:  Local lidocaine/fentanyl and midazolam as needed.     Estimated Blood Loss (mL):     Complications:     Recommendations:  .See orders in Epic.  Bed rest for 2 hours.  Watch access site/s for bleeding or swelling.  Hold pressure if bleeding or swelling.  Ambulate 2 hour after suture/sheath removal.  Remove Barton's catheter (if in place) once patient ambulates.  Stop IV fluids once patient starts eating.  Resume home medications as tonight.   Follow up in 4 weeks in EP clinic.         Electronically signed by Rudy Marcus MD, FACC, FHRS on 5/24/2024 at 10:28 AM

## 2024-05-24 NOTE — PROCEDURES
[facility]  HEART CENTER (EP)  [address] [address]  Dept: [phone]  ELECTROPHYSIOLOGY PROCEDURE NOTE  Patients Demographics:  Date:   5/24/2024  Patient name:              Cameron Ortiz  YOB: 1935  Sex: male   MRN:   [id number]    Primary Care Provider:    Jose Luis Cevallos APRN - CNP     Procedure Planned:  Dual chamber pacemaker implantation.    Cardiologist:  Deborah Dorsey MD, [facility]     Indications for Procedure  Tachycardia-bradycardia syndrome     Brief Medical History:  88/M with symptomatic atrial fibrillation, Mobitz type II second-degree AV block with pauses, longest 2.9 seconds while awake), and indication for beta-blocker and anti-arrhythmic drug therapy electively presents for a pacemaker implantation. Normal LVEF. Medical Hx: CAD/PCI-LAD and RCA (3/28/19 and 11/2021) , PAF on amiodarone, HTN, HLD, and TIA (chronic microvascular disease).      Procedure Performed:  Dual chamber pacemaker implantation.    Procedure Details:  The patient was brought to the electrophysiology lab in a fasting and non-sedated state. He was prepped and draped in the usual sterile fashion.  Fentanyl and midazolam was used for for conscious sedation and analgesia respectively. The left pectoral region was liberally infiltrated with 2% lidocaine for local anesthesia.  Left axillary vein was cannulated x2 under ultrasound guidance using 21G micro-puncture needle and 2 standard J-tip 0.035 inch guidewires were advanced into IVC. A 4 cm long incision was made in the left pectoral region using #10 blade and a subcutaneous pocket was made. Two 7-Grenadian hemostatic peel away sheaths were advanced into the axillary vein over the guide wires. Through one of the sheaths, a 7F fixed curve curve delivery sheath ([id number]) was advanced over the Wholly wire and its tip parked along kelley-septal sub-annular region of tricuspid valve. A contrast angiography was performed to

## 2024-05-24 NOTE — PROGRESS NOTES
0700  Pt admitted to  2E02 ambulatory for pacemaker implant.  Pt NPO. Patient accompanied by spouse.   Vital signs obtained.  Assessment and data collection initiated.   Oriented to room.   Policies and procedures for 2E explained   All questions answered with no further questions at this time.   Fall prevention and safety precautions discussed with patient.   Care plan reviewed with patient.  Patient verbalizes understanding of the plan of care and contribute to goal setting.

## 2024-05-24 NOTE — FLOWSHEET NOTE
05/24/24 1425   AVS Reviewed   AVS & discharge instructions reviewed with patient and/or representative? Yes   Reviewed instructions with Patient;Other (name and relationship in comment)  (spouse)   Level of Understanding Questions answered;Teach back completed;Verbalized understanding

## 2024-05-30 ENCOUNTER — OFFICE VISIT (OUTPATIENT)
Dept: CARDIOLOGY | Age: 89
End: 2024-05-30
Payer: MEDICARE

## 2024-05-30 VITALS
SYSTOLIC BLOOD PRESSURE: 128 MMHG | WEIGHT: 142.4 LBS | HEART RATE: 62 BPM | DIASTOLIC BLOOD PRESSURE: 62 MMHG | HEIGHT: 68 IN | BODY MASS INDEX: 21.58 KG/M2 | RESPIRATION RATE: 18 BRPM | OXYGEN SATURATION: 99 %

## 2024-05-30 DIAGNOSIS — Z95.0 CARDIAC PACEMAKER IN SITU: ICD-10-CM

## 2024-05-30 DIAGNOSIS — I73.9 PVD (PERIPHERAL VASCULAR DISEASE) (HCC): ICD-10-CM

## 2024-05-30 DIAGNOSIS — I50.32 CHRONIC DIASTOLIC CHF (CONGESTIVE HEART FAILURE), NYHA CLASS 3 (HCC): ICD-10-CM

## 2024-05-30 DIAGNOSIS — G45.9 TIA (TRANSIENT ISCHEMIC ATTACK): ICD-10-CM

## 2024-05-30 DIAGNOSIS — Z95.820 S/P ANGIOPLASTY WITH STENT: ICD-10-CM

## 2024-05-30 DIAGNOSIS — I25.10 ASHD (ARTERIOSCLEROTIC HEART DISEASE): ICD-10-CM

## 2024-05-30 DIAGNOSIS — Z95.0 CARDIAC RESYNCHRONIZATION THERAPY PACEMAKER (CRT-P) IN PLACE: ICD-10-CM

## 2024-05-30 DIAGNOSIS — R00.1 SYMPTOMATIC BRADYCARDIA: Primary | ICD-10-CM

## 2024-05-30 PROBLEM — I25.118 ATHEROSCLEROTIC HEART DISEASE OF NATIVE CORONARY ARTERY WITH OTHER FORMS OF ANGINA PECTORIS (HCC): Status: ACTIVE | Noted: 2024-05-30

## 2024-05-30 PROCEDURE — 93288 INTERROG EVL PM/LDLS PM IP: CPT | Performed by: INTERNAL MEDICINE

## 2024-05-30 PROCEDURE — 99211 OFF/OP EST MAY X REQ PHY/QHP: CPT | Performed by: INTERNAL MEDICINE

## 2024-05-30 NOTE — PROGRESS NOTES
MG SL tablet Place 1 tablet under the tongue every 5 minutes as needed for Chest pain 25 tablet 3    Handicap Placard MISC by Does not apply route EXPIRATION DATE: 3 YEARS 1 each 0    zinc gluconate 50 MG tablet Take 1 tablet by mouth daily         FAMILY HISTORY: family history includes Arrhythmia in his sister; Heart Attack in his father and mother; Heart Disease in his father and mother; Hypertension in his mother; Kidney Disease in his brother; No Known Problems in his brother; Parkinsonism in his father.     Physical Examination:      /62 (Site: Right Upper Arm, Position: Sitting, Cuff Size: Medium Adult)   Pulse 62   Resp 18   Ht 1.727 m (5' 8\")   Wt 64.6 kg (142 lb 6.4 oz)   SpO2 99%   BMI 21.65 kg/m²  Body mass index is 21.65 kg/m².    Constitutional: He is oriented to person. He appears well-developed and well-nourished. In no acute distress.   HEENT: Normocephalic and atraumatic.No JVD present. Carotid bruit is not present. No mass and no thyromegaly present. No lymphadenopathy present.  Cardiovascular: Normal rate, regular rhythm, normal heart sounds. Exam reveals no gallop and no friction rubs. No murmur was heard.  Pulmonary/Chest: Effort normal and breath sounds normal. No respiratory distress. He has no wheezes, rhonchi or rales. Abdominal: Soft, non-tender. Bowel sounds and aorta are normal. He exhibits no organomegaly, mass or bruit.   Extremities: No edema.  No cyanosis or clubbing. 2+ radial and carotid pulses. Distal extremity pulses: 2+ bilaterally.  Neurological: He is alert and oriented to person. No evidence of gross cranial nerve deficit. Coordination appeared normal.   Skin: Skin is warm and dry. There is no rash or diaphoresis.   Psychiatric: He has a normal mood and affect. His speech is normal and behavior is normal.      MOST RECENT LABS ON RECORD:   Lab Results   Component Value Date    WBC 7.0 05/24/2024    HGB 14.0 05/24/2024    HCT 43.8 05/24/2024     05/24/2024

## 2024-06-03 PROBLEM — Z95.0 PACEMAKER: Status: ACTIVE | Noted: 2024-06-03

## 2024-06-12 ENCOUNTER — NURSE ONLY (OUTPATIENT)
Dept: CARDIOLOGY | Age: 89
End: 2024-06-12
Payer: MEDICARE

## 2024-06-12 DIAGNOSIS — I42.8 NON-ISCHEMIC CARDIOMYOPATHY (HCC): Primary | ICD-10-CM

## 2024-06-12 DIAGNOSIS — Z95.0 PACEMAKER: ICD-10-CM

## 2024-06-12 PROCEDURE — 93288 INTERROG EVL PM/LDLS PM IP: CPT | Performed by: INTERNAL MEDICINE

## 2024-06-17 RX ORDER — CARVEDILOL 3.12 MG/1
3.12 TABLET ORAL 2 TIMES DAILY
Qty: 180 TABLET | Refills: 3 | Status: SHIPPED | OUTPATIENT
Start: 2024-06-17

## 2024-07-26 DIAGNOSIS — I47.29 NSVT (NONSUSTAINED VENTRICULAR TACHYCARDIA) (HCC): ICD-10-CM

## 2024-07-26 DIAGNOSIS — I50.32 CHRONIC DIASTOLIC CHF (CONGESTIVE HEART FAILURE), NYHA CLASS 3 (HCC): ICD-10-CM

## 2024-07-26 DIAGNOSIS — G45.9 TIA (TRANSIENT ISCHEMIC ATTACK): ICD-10-CM

## 2024-07-26 DIAGNOSIS — I42.8 NON-ISCHEMIC CARDIOMYOPATHY (HCC): Primary | ICD-10-CM

## 2024-07-26 RX ORDER — AMIODARONE HYDROCHLORIDE 200 MG/1
200 TABLET ORAL DAILY
Qty: 90 TABLET | Refills: 3 | Status: SHIPPED | OUTPATIENT
Start: 2024-07-26

## 2024-07-26 RX ORDER — CLOPIDOGREL BISULFATE 75 MG/1
75 TABLET ORAL DAILY
Qty: 90 TABLET | Refills: 3 | Status: SHIPPED | OUTPATIENT
Start: 2024-07-26 | End: 2025-07-26

## 2024-09-23 ENCOUNTER — OFFICE VISIT (OUTPATIENT)
Dept: NEUROLOGY | Age: 89
End: 2024-09-23
Payer: MEDICARE

## 2024-09-23 VITALS
WEIGHT: 145.5 LBS | RESPIRATION RATE: 16 BRPM | DIASTOLIC BLOOD PRESSURE: 64 MMHG | HEART RATE: 85 BPM | BODY MASS INDEX: 22.05 KG/M2 | SYSTOLIC BLOOD PRESSURE: 134 MMHG | HEIGHT: 68 IN | TEMPERATURE: 96.8 F

## 2024-09-23 DIAGNOSIS — R42 DIZZY SPELLS: Primary | ICD-10-CM

## 2024-09-23 DIAGNOSIS — M48.02 SPINAL STENOSIS OF CERVICAL REGION: ICD-10-CM

## 2024-09-23 PROCEDURE — G8427 DOCREV CUR MEDS BY ELIG CLIN: HCPCS | Performed by: NEUROMUSCULOSKELETAL MEDICINE, SPORTS MEDICINE

## 2024-09-23 PROCEDURE — 1123F ACP DISCUSS/DSCN MKR DOCD: CPT | Performed by: NEUROMUSCULOSKELETAL MEDICINE, SPORTS MEDICINE

## 2024-09-23 PROCEDURE — 1036F TOBACCO NON-USER: CPT | Performed by: NEUROMUSCULOSKELETAL MEDICINE, SPORTS MEDICINE

## 2024-09-23 PROCEDURE — G8420 CALC BMI NORM PARAMETERS: HCPCS | Performed by: NEUROMUSCULOSKELETAL MEDICINE, SPORTS MEDICINE

## 2024-09-23 PROCEDURE — 99204 OFFICE O/P NEW MOD 45 MIN: CPT | Performed by: NEUROMUSCULOSKELETAL MEDICINE, SPORTS MEDICINE

## 2024-09-23 PROCEDURE — 99213 OFFICE O/P EST LOW 20 MIN: CPT | Performed by: NEUROMUSCULOSKELETAL MEDICINE, SPORTS MEDICINE

## 2024-09-27 ENCOUNTER — HOSPITAL ENCOUNTER (OUTPATIENT)
Dept: PHYSICAL THERAPY | Age: 89
Setting detail: THERAPIES SERIES
Discharge: HOME | End: 2024-09-27
Payer: MEDICARE

## 2024-09-27 PROCEDURE — 97161 PT EVAL LOW COMPLEX 20 MIN: CPT

## 2024-10-21 PROBLEM — N18.32 STAGE 3B CHRONIC KIDNEY DISEASE (HCC): Status: ACTIVE | Noted: 2022-07-14

## 2024-10-24 ENCOUNTER — HOSPITAL ENCOUNTER (OUTPATIENT)
Dept: CT IMAGING | Age: 89
Discharge: HOME | End: 2024-10-26
Payer: MEDICARE

## 2024-10-24 DIAGNOSIS — M48.02 SPINAL STENOSIS OF CERVICAL REGION: ICD-10-CM

## 2024-10-24 PROCEDURE — 72125 CT NECK SPINE W/O DYE: CPT

## 2024-11-07 ENCOUNTER — OFFICE VISIT (OUTPATIENT)
Dept: NEUROLOGY | Age: 89
End: 2024-11-07
Payer: MEDICARE

## 2024-11-07 VITALS
HEART RATE: 82 BPM | DIASTOLIC BLOOD PRESSURE: 70 MMHG | WEIGHT: 149.1 LBS | SYSTOLIC BLOOD PRESSURE: 132 MMHG | TEMPERATURE: 96.6 F | RESPIRATION RATE: 16 BRPM | BODY MASS INDEX: 22.6 KG/M2 | HEIGHT: 68 IN

## 2024-11-07 DIAGNOSIS — M50.30 DEGENERATIVE DISC DISEASE, CERVICAL: Primary | ICD-10-CM

## 2024-11-07 DIAGNOSIS — R42 DIZZY SPELLS: ICD-10-CM

## 2024-11-07 PROCEDURE — 1036F TOBACCO NON-USER: CPT | Performed by: NEUROMUSCULOSKELETAL MEDICINE, SPORTS MEDICINE

## 2024-11-07 PROCEDURE — 1159F MED LIST DOCD IN RCRD: CPT | Performed by: NEUROMUSCULOSKELETAL MEDICINE, SPORTS MEDICINE

## 2024-11-07 PROCEDURE — 1126F AMNT PAIN NOTED NONE PRSNT: CPT | Performed by: NEUROMUSCULOSKELETAL MEDICINE, SPORTS MEDICINE

## 2024-11-07 PROCEDURE — G8427 DOCREV CUR MEDS BY ELIG CLIN: HCPCS | Performed by: NEUROMUSCULOSKELETAL MEDICINE, SPORTS MEDICINE

## 2024-11-07 PROCEDURE — G8420 CALC BMI NORM PARAMETERS: HCPCS | Performed by: NEUROMUSCULOSKELETAL MEDICINE, SPORTS MEDICINE

## 2024-11-07 PROCEDURE — G8484 FLU IMMUNIZE NO ADMIN: HCPCS | Performed by: NEUROMUSCULOSKELETAL MEDICINE, SPORTS MEDICINE

## 2024-11-07 PROCEDURE — 99212 OFFICE O/P EST SF 10 MIN: CPT | Performed by: NEUROMUSCULOSKELETAL MEDICINE, SPORTS MEDICINE

## 2024-11-07 PROCEDURE — 1123F ACP DISCUSS/DSCN MKR DOCD: CPT | Performed by: NEUROMUSCULOSKELETAL MEDICINE, SPORTS MEDICINE

## 2024-11-07 RX ORDER — ISOSORBIDE DINITRATE 30 MG/1
30 TABLET ORAL 4 TIMES DAILY
COMMUNITY

## 2024-11-07 NOTE — PATIENT INSTRUCTIONS
SURVEY:    Thank you for allowing us to care for you today.    You may be receiving a survey from [facility] regarding your visit today- electronically or via mail.      Please help us by completing the survey as this will provide the needed feedback to ensure we are providing the very best care for you and your family.    If you cannot score us a very good on any question, please call the office to discuss how we could have made your experience a very good one.    Thank you.       STAFF:    Lissett Shukla, Karen De La Rosa, Sherrie Adams      CLINICAL STAFF:    Callie Montero LPN, Addie Siddiqi LPN, Gardenia Johnson LPN, Nedra Haji CMA

## 2024-11-07 NOTE — PROGRESS NOTES
Hematologic Abnormal bleeding: absent, Anemia: absent, Lymph gland changes: absent Clotting disorder: absent     Past Medical History:   Diagnosis Date    Abnormal stress test 2017    Arrhythmia     ASHD (arteriosclerotic heart disease)     T cardiology    Cerebral artery occlusion with cerebral infarction (HCC)     TIA    CKD (chronic kidney disease)     Closed traumatic fracture of right femur with routine healing 1955    trauma while in [employer], plate and scews placed and removed    COPD (chronic obstructive pulmonary disease) (HCC)     Elevated PSA     Enlarged prostate     Gallstones     Gout     History of kidney stones     Hx of blood clots     Hx of cardiac cath 11/02/2021    [facility]/Dr. Posadas/Right Radial     Hydrocele in adult     Hyperlipidemia     Hypertension     Renal stones     Trigger finger     Vertigo     T neurology    Wrist fracture, left        Past Surgical History:   Procedure Laterality Date    APPENDECTOMY      21 yo    CARDIAC CATHETERIZATION Left 03/28/2019    Right Radial/[facility]/ : OMAR to mid RCA and mid LAD weith Dr. Cunha    CARDIAC PROCEDURE N/A 9/14/2023    Left heart cath / coronary angiography performed by Calvin [facility]adas MD at [facility] CARDIAC CATH/IR LAB    CORONARY ANGIOPLASTY WITH STENT PLACEMENT  11/08/2021    CYSTOURETHROSCOPY      EP DEVICE PROCEDURE Left 5/24/2024    Insert PPM dual performed by Rudy Marcus MD at [facility] CARDIAC CATH LAB    FEMUR SURGERY  1954    plate and screws s/p trauma  and taken out in 1956    FRACTURE SURGERY      HYDROCELE EXCISION  9/12/2012    JOINT REPLACEMENT      LITHOTRIPSY      PROSTATE BIOPSY  10/22/1998, 8/26/2004    TOTAL HIP ARTHROPLASTY Right 1999       Social History     Socioeconomic History    Marital status:      Spouse name: Not on file    Number of children: Not on file    Years of education: Not on file    Highest education level: Not on file   Occupational History    Not on file

## 2024-12-10 ENCOUNTER — NURSE ONLY (OUTPATIENT)
Dept: CARDIOLOGY | Age: 88
End: 2024-12-10

## 2024-12-10 DIAGNOSIS — Z95.0 CARDIAC PACEMAKER IN SITU: Primary | ICD-10-CM

## 2024-12-10 DIAGNOSIS — R00.1 SYMPTOMATIC BRADYCARDIA: ICD-10-CM

## 2024-12-11 ENCOUNTER — OFFICE VISIT (OUTPATIENT)
Dept: CARDIOLOGY | Age: 88
End: 2024-12-11

## 2024-12-11 VITALS
DIASTOLIC BLOOD PRESSURE: 70 MMHG | OXYGEN SATURATION: 100 % | RESPIRATION RATE: 16 BRPM | WEIGHT: 152 LBS | SYSTOLIC BLOOD PRESSURE: 132 MMHG | BODY MASS INDEX: 23.04 KG/M2 | HEIGHT: 68 IN | HEART RATE: 82 BPM

## 2024-12-11 DIAGNOSIS — G45.9 TIA (TRANSIENT ISCHEMIC ATTACK): ICD-10-CM

## 2024-12-11 DIAGNOSIS — Z95.0 PACEMAKER: ICD-10-CM

## 2024-12-11 DIAGNOSIS — R00.1 SYMPTOMATIC BRADYCARDIA: ICD-10-CM

## 2024-12-11 DIAGNOSIS — Z86.79 HISTORY OF VENTRICULAR TACHYCARDIA: ICD-10-CM

## 2024-12-11 DIAGNOSIS — I10 ESSENTIAL HYPERTENSION: ICD-10-CM

## 2024-12-11 DIAGNOSIS — E78.2 MIXED HYPERLIPIDEMIA: ICD-10-CM

## 2024-12-11 DIAGNOSIS — I25.10 ASHD (ARTERIOSCLEROTIC HEART DISEASE): ICD-10-CM

## 2024-12-11 DIAGNOSIS — R42 LIGHTHEADEDNESS: Primary | ICD-10-CM

## 2024-12-11 RX ORDER — METOPROLOL SUCCINATE 50 MG/1
50 TABLET, EXTENDED RELEASE ORAL DAILY
Qty: 90 TABLET | Refills: 3 | Status: SHIPPED | OUTPATIENT
Start: 2024-12-11

## 2024-12-11 NOTE — PROGRESS NOTES
own.   Beta Blocker: STOP Carvedilol (Coreg) and START Metoprolol succinate (Toprol XL) 50 mg daily. I also discussed the potential side effects of this medication including lightheadedness and dizziness and instructed them to stop the medication of this occurs and call our office if this occurs.  Calcium Channel Blocker: Continue amlodipine (Norvasc) 5 mg daily.      Other Anti-anginal medications: Continue  Nitroglycerin as needed.  Continue Praluent.   Counseled patient extensively to come to the emergency room if he has worsening shortness of breath or chest pain and this can be life-threatening.    Transient ischemic attack 4/8/2021 his CTA of the head and neck and MRI of the brain.  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  Antiplatelet Agent: Continue clopidogrel (Plavix): Plavix 75 mg daily.  Continue Praluent      Essential Hypertension: Controlled   Beta Blocker: Continue Carvedilol (Coreg) 3.125 mg twice daily.  Calcium Channel Blocker: Continue amlodipine (Norvasc) 5 mg once daily.       Hyperlipidemia: Mixed: history of statin intolerance to statin therapy Last LDL 2020: 45  Cholesterol Reduction Therapy: Continue praluent  Repeat lipid panel to assess LDL.     Dual Chamber Pacemaker:  Indication for Device Placement: Symptomatic Bradycardia  Interrogation Findings: Within normal limits and therefore no changes were made.  We will get monthly transmissions to ensure he has nomore VT with being off Amiodarone     Chronic Kidney Disease: Stage IV-Last Creatinine was 2.1 and GFR: 30 on 5/24/2024  Repeat BMP today.   Depending on these results I may plan to refer him back to nephrology as he reports that he does not see a nephrologist at this time.     Finally, I recommended that he continue his other medications and follow up with you as previously scheduled.    FOLLOW UP:   I told Mr. Ortiz to call my office if he had any problems, but otherwise I asked him

## 2024-12-12 LAB
ALBUMIN: 4.3 G/DL (ref 3.5–5.2)
ALK PHOSPHATASE: 106 U/L (ref 39–118)
ALT SERPL-CCNC: 18 U/L (ref 5–41)
ANION GAP SERPL CALCULATED.3IONS-SCNC: 16 MMOL/L (ref 7–16)
AST SERPL-CCNC: 25 U/L (ref 9–50)
BILIRUB SERPL-MCNC: 0.7 MG/DL
BUN BLDV-MCNC: 33 MG/DL (ref 8–23)
CALCIUM SERPL-MCNC: 9.7 MG/DL (ref 8.6–10.5)
CHLORIDE BLD-SCNC: 107 MMOL/L (ref 96–107)
CHOLESTEROL, TOTAL: 100 MG/DL (ref 100–199)
CHOLESTEROL/HDL RATIO: 1.7 (ref 2–4.5)
CO2: 23 MMOL/L (ref 18–32)
CREAT SERPL-MCNC: 1.98 MG/DL (ref 0.67–1.3)
EGFR IF NONAFRICAN AMERICAN: 32 ML/MIN/1.73M2
GLUCOSE: 60 MG/DL (ref 65–125)
HCT VFR BLD CALC: 42.6 % (ref 39–52)
HDLC SERPL-MCNC: 59 MG/DL
HEMOGLOBIN: 13.7 G/DL (ref 13–18)
LDL CHOLESTEROL: 29 MG/DL
LDL/HDL RATIO: 0.5
MCH RBC QN AUTO: 28 PG (ref 26–32)
MCHC RBC AUTO-ENTMCNC: 32.2 G/DL (ref 32–35)
MCV RBC AUTO: 87 FL (ref 75–100)
PDW BLD-RTO: 13.8 % (ref 11.2–14.8)
PLATELET # BLD: 262 THOUS/CMM (ref 140–440)
POTASSIUM SERPL-SCNC: 4.7 MMOL/L (ref 3.5–5.4)
RBC # BLD: 4.89 MILL/CMM (ref 4.4–6.1)
SODIUM BLD-SCNC: 146 MMOL/L (ref 135–148)
T4 FREE: 0.89 NG/DL (ref 0.8–1.9)
TOTAL PROTEIN: 7.1 G/DL (ref 6–8.3)
TRIGL SERPL-MCNC: 61 MG/DL (ref 20–149)
TSH SERPL DL<=0.05 MIU/L-ACNC: 24.7 UIU/ML (ref 0.27–4.2)
VLDLC SERPL CALC-MCNC: 12 MG/DL
WBC # BLD: 8.4 THDS/CMM (ref 3.6–11)

## 2024-12-13 ENCOUNTER — TELEPHONE (OUTPATIENT)
Dept: CARDIOLOGY | Age: 88
End: 2024-12-13

## 2024-12-13 NOTE — TELEPHONE ENCOUNTER
----- Message from Dr. Deborah Dorsey MD sent at 12/12/2024 10:09 PM EST -----  Blood work is stable except for the thyroid function.  Please follow-up with Jose Luis Cevallos APRN - CNP for further evaluation/workup.  Please call with questions and/or concerns.  Thank you

## 2024-12-19 ENCOUNTER — HOSPITAL ENCOUNTER (INPATIENT)
Age: 88
LOS: 4 days | Discharge: NURSING, REHAB OR LONG-TERM CARE | DRG: 682 | End: 2024-12-23
Attending: EMERGENCY MEDICINE | Admitting: INTERNAL MEDICINE
Payer: MEDICARE

## 2024-12-19 ENCOUNTER — APPOINTMENT (OUTPATIENT)
Dept: CT IMAGING | Age: 88
DRG: 682 | End: 2024-12-19
Payer: MEDICARE

## 2024-12-19 ENCOUNTER — APPOINTMENT (OUTPATIENT)
Dept: GENERAL RADIOLOGY | Age: 88
DRG: 682 | End: 2024-12-19
Payer: MEDICARE

## 2024-12-19 DIAGNOSIS — N30.00 ACUTE CYSTITIS WITHOUT HEMATURIA: ICD-10-CM

## 2024-12-19 DIAGNOSIS — J18.9 PNEUMONIA OF LEFT LOWER LOBE DUE TO INFECTIOUS ORGANISM: ICD-10-CM

## 2024-12-19 DIAGNOSIS — G93.40 ENCEPHALOPATHY ACUTE: Primary | ICD-10-CM

## 2024-12-19 DIAGNOSIS — N18.9 CHRONIC KIDNEY DISEASE, UNSPECIFIED CKD STAGE: ICD-10-CM

## 2024-12-19 PROBLEM — R44.1 VISUAL HALLUCINATIONS: Status: ACTIVE | Noted: 2024-12-19

## 2024-12-19 PROBLEM — N17.9 ACUTE KIDNEY INJURY SUPERIMPOSED ON CKD (HCC): Status: ACTIVE | Noted: 2024-12-19

## 2024-12-19 PROBLEM — N39.0 COMPLICATED UTI (URINARY TRACT INFECTION): Status: ACTIVE | Noted: 2024-12-19

## 2024-12-19 LAB
ALBUMIN SERPL-MCNC: 4.1 G/DL (ref 3.5–5.2)
ALBUMIN/GLOB SERPL: 1.5 {RATIO} (ref 1–2.5)
ALP SERPL-CCNC: 104 U/L (ref 40–129)
ALT SERPL-CCNC: 18 U/L (ref 10–50)
ANION GAP SERPL CALCULATED.3IONS-SCNC: 12 MMOL/L (ref 9–16)
AST SERPL-CCNC: 34 U/L (ref 10–50)
BASOPHILS # BLD: 0.06 K/UL (ref 0–0.2)
BASOPHILS NFR BLD: 1 % (ref 0–2)
BILIRUB SERPL-MCNC: 0.4 MG/DL (ref 0–1.2)
BILIRUB UR QL STRIP: NEGATIVE
BUN SERPL-MCNC: 41 MG/DL (ref 8–23)
BUN/CREAT SERPL: 17 (ref 9–20)
CALCIUM SERPL-MCNC: 9.5 MG/DL (ref 8.6–10.4)
CHLORIDE SERPL-SCNC: 108 MMOL/L (ref 98–107)
CLARITY UR: CLEAR
CO2 SERPL-SCNC: 20 MMOL/L (ref 20–31)
COLOR UR: YELLOW
CREAT SERPL-MCNC: 2.4 MG/DL (ref 0.7–1.2)
EOSINOPHIL # BLD: 0.19 K/UL (ref 0–0.44)
EOSINOPHILS RELATIVE PERCENT: 2 % (ref 1–4)
EPI CELLS #/AREA URNS HPF: ABNORMAL /HPF (ref 0–5)
ERYTHROCYTE [DISTWIDTH] IN BLOOD BY AUTOMATED COUNT: 15.3 % (ref 11.8–14.4)
GFR, ESTIMATED: 25 ML/MIN/1.73M2
GLUCOSE SERPL-MCNC: 73 MG/DL (ref 74–99)
GLUCOSE UR STRIP-MCNC: NEGATIVE MG/DL
HCT VFR BLD AUTO: 41.9 % (ref 40.7–50.3)
HGB BLD-MCNC: 13.4 G/DL (ref 13–17)
HGB UR QL STRIP.AUTO: ABNORMAL
IMM GRANULOCYTES # BLD AUTO: 0.09 K/UL (ref 0–0.3)
IMM GRANULOCYTES NFR BLD: 1 %
KETONES UR STRIP-MCNC: NEGATIVE MG/DL
LACTATE BLDV-SCNC: 1.5 MMOL/L (ref 0.5–1.9)
LEUKOCYTE ESTERASE UR QL STRIP: ABNORMAL
LYMPHOCYTES NFR BLD: 1.64 K/UL (ref 1.1–3.7)
LYMPHOCYTES RELATIVE PERCENT: 20 % (ref 24–43)
MCH RBC QN AUTO: 27.5 PG (ref 25.2–33.5)
MCHC RBC AUTO-ENTMCNC: 32 G/DL (ref 28.4–34.8)
MCV RBC AUTO: 86 FL (ref 82.6–102.9)
MONOCYTES NFR BLD: 0.59 K/UL (ref 0.1–1.2)
MONOCYTES NFR BLD: 7 % (ref 3–12)
MUCOUS THREADS URNS QL MICRO: ABNORMAL
NEUTROPHILS NFR BLD: 69 % (ref 36–65)
NEUTS SEG NFR BLD: 5.76 K/UL (ref 1.5–8.1)
NITRITE UR QL STRIP: NEGATIVE
NRBC BLD-RTO: 0 PER 100 WBC
PH UR STRIP: 6 [PH] (ref 5–9)
PLATELET # BLD AUTO: 215 K/UL (ref 138–453)
PMV BLD AUTO: 10.7 FL (ref 8.1–13.5)
POTASSIUM SERPL-SCNC: 4.7 MMOL/L (ref 3.7–5.3)
PROT SERPL-MCNC: 6.9 G/DL (ref 6.6–8.7)
PROT UR STRIP-MCNC: ABNORMAL MG/DL
RBC # BLD AUTO: 4.87 M/UL (ref 4.21–5.77)
RBC #/AREA URNS HPF: ABNORMAL /HPF (ref 0–2)
SODIUM SERPL-SCNC: 140 MMOL/L (ref 136–145)
SP GR UR STRIP: 1.02 (ref 1.01–1.02)
TROPONIN I SERPL HS-MCNC: 51 NG/L (ref 0–22)
TROPONIN I SERPL HS-MCNC: 56 NG/L (ref 0–22)
UROBILINOGEN UR STRIP-ACNC: NORMAL EU/DL (ref 0–1)
WBC #/AREA URNS HPF: ABNORMAL /HPF (ref 0–5)
WBC OTHER # BLD: 8.3 K/UL (ref 3.5–11.3)

## 2024-12-19 PROCEDURE — 94761 N-INVAS EAR/PLS OXIMETRY MLT: CPT

## 2024-12-19 PROCEDURE — 94669 MECHANICAL CHEST WALL OSCILL: CPT

## 2024-12-19 PROCEDURE — 6360000002 HC RX W HCPCS: Performed by: EMERGENCY MEDICINE

## 2024-12-19 PROCEDURE — 83605 ASSAY OF LACTIC ACID: CPT

## 2024-12-19 PROCEDURE — 70450 CT HEAD/BRAIN W/O DYE: CPT

## 2024-12-19 PROCEDURE — 36415 COLL VENOUS BLD VENIPUNCTURE: CPT

## 2024-12-19 PROCEDURE — 99285 EMERGENCY DEPT VISIT HI MDM: CPT

## 2024-12-19 PROCEDURE — 87040 BLOOD CULTURE FOR BACTERIA: CPT

## 2024-12-19 PROCEDURE — 2580000003 HC RX 258: Performed by: EMERGENCY MEDICINE

## 2024-12-19 PROCEDURE — 81001 URINALYSIS AUTO W/SCOPE: CPT

## 2024-12-19 PROCEDURE — 6360000002 HC RX W HCPCS: Performed by: NURSE PRACTITIONER

## 2024-12-19 PROCEDURE — 93005 ELECTROCARDIOGRAM TRACING: CPT | Performed by: EMERGENCY MEDICINE

## 2024-12-19 PROCEDURE — 80053 COMPREHEN METABOLIC PANEL: CPT

## 2024-12-19 PROCEDURE — 85025 COMPLETE CBC W/AUTO DIFF WBC: CPT

## 2024-12-19 PROCEDURE — 93005 ELECTROCARDIOGRAM TRACING: CPT | Performed by: NURSE PRACTITIONER

## 2024-12-19 PROCEDURE — 2500000003 HC RX 250 WO HCPCS: Performed by: EMERGENCY MEDICINE

## 2024-12-19 PROCEDURE — 71046 X-RAY EXAM CHEST 2 VIEWS: CPT

## 2024-12-19 PROCEDURE — 87086 URINE CULTURE/COLONY COUNT: CPT

## 2024-12-19 PROCEDURE — 6370000000 HC RX 637 (ALT 250 FOR IP): Performed by: NURSE PRACTITIONER

## 2024-12-19 PROCEDURE — 96375 TX/PRO/DX INJ NEW DRUG ADDON: CPT

## 2024-12-19 PROCEDURE — 96374 THER/PROPH/DIAG INJ IV PUSH: CPT

## 2024-12-19 PROCEDURE — 84484 ASSAY OF TROPONIN QUANT: CPT

## 2024-12-19 PROCEDURE — 2580000003 HC RX 258: Performed by: NURSE PRACTITIONER

## 2024-12-19 PROCEDURE — 0202U NFCT DS 22 TRGT SARS-COV-2: CPT

## 2024-12-19 PROCEDURE — 1200000000 HC SEMI PRIVATE

## 2024-12-19 RX ORDER — METOPROLOL SUCCINATE 50 MG/1
50 TABLET, EXTENDED RELEASE ORAL DAILY
Status: DISCONTINUED | OUTPATIENT
Start: 2024-12-19 | End: 2024-12-23 | Stop reason: HOSPADM

## 2024-12-19 RX ORDER — ISOSORBIDE MONONITRATE 30 MG/1
30 TABLET, EXTENDED RELEASE ORAL DAILY
Status: DISCONTINUED | OUTPATIENT
Start: 2024-12-19 | End: 2024-12-23 | Stop reason: HOSPADM

## 2024-12-19 RX ORDER — ZINC SULFATE 50(220)MG
50 CAPSULE ORAL DAILY
Status: DISCONTINUED | OUTPATIENT
Start: 2024-12-19 | End: 2024-12-23 | Stop reason: HOSPADM

## 2024-12-19 RX ORDER — SODIUM CHLORIDE 9 MG/ML
INJECTION, SOLUTION INTRAVENOUS CONTINUOUS
Status: DISCONTINUED | OUTPATIENT
Start: 2024-12-19 | End: 2024-12-19

## 2024-12-19 RX ORDER — SODIUM CHLORIDE 0.9 % (FLUSH) 0.9 %
5-40 SYRINGE (ML) INJECTION EVERY 12 HOURS SCHEDULED
Status: DISCONTINUED | OUTPATIENT
Start: 2024-12-19 | End: 2024-12-23 | Stop reason: HOSPADM

## 2024-12-19 RX ORDER — POLYETHYLENE GLYCOL 3350 17 G/17G
17 POWDER, FOR SOLUTION ORAL DAILY PRN
Status: DISCONTINUED | OUTPATIENT
Start: 2024-12-19 | End: 2024-12-23 | Stop reason: HOSPADM

## 2024-12-19 RX ORDER — ACETAMINOPHEN 325 MG/1
650 TABLET ORAL EVERY 6 HOURS PRN
Status: DISCONTINUED | OUTPATIENT
Start: 2024-12-19 | End: 2024-12-23 | Stop reason: HOSPADM

## 2024-12-19 RX ORDER — MECLIZINE HCL 12.5 MG 12.5 MG/1
12.5 TABLET ORAL 3 TIMES DAILY PRN
Status: DISCONTINUED | OUTPATIENT
Start: 2024-12-19 | End: 2024-12-23 | Stop reason: HOSPADM

## 2024-12-19 RX ORDER — DEXTROSE MONOHYDRATE AND SODIUM CHLORIDE 5; .45 G/100ML; G/100ML
INJECTION, SOLUTION INTRAVENOUS CONTINUOUS
Status: DISCONTINUED | OUTPATIENT
Start: 2024-12-19 | End: 2024-12-20

## 2024-12-19 RX ORDER — SODIUM CHLORIDE 9 MG/ML
INJECTION, SOLUTION INTRAVENOUS PRN
Status: DISCONTINUED | OUTPATIENT
Start: 2024-12-19 | End: 2024-12-23 | Stop reason: HOSPADM

## 2024-12-19 RX ORDER — ONDANSETRON 2 MG/ML
4 INJECTION INTRAMUSCULAR; INTRAVENOUS EVERY 6 HOURS PRN
Status: DISCONTINUED | OUTPATIENT
Start: 2024-12-19 | End: 2024-12-23 | Stop reason: HOSPADM

## 2024-12-19 RX ORDER — ENOXAPARIN SODIUM 100 MG/ML
30 INJECTION SUBCUTANEOUS DAILY
Status: DISCONTINUED | OUTPATIENT
Start: 2024-12-19 | End: 2024-12-23 | Stop reason: HOSPADM

## 2024-12-19 RX ORDER — ONDANSETRON 4 MG/1
4 TABLET, ORALLY DISINTEGRATING ORAL EVERY 8 HOURS PRN
Status: DISCONTINUED | OUTPATIENT
Start: 2024-12-19 | End: 2024-12-23 | Stop reason: HOSPADM

## 2024-12-19 RX ORDER — AMLODIPINE BESYLATE 5 MG/1
5 TABLET ORAL DAILY
Status: DISCONTINUED | OUTPATIENT
Start: 2024-12-19 | End: 2024-12-23 | Stop reason: HOSPADM

## 2024-12-19 RX ORDER — SODIUM CHLORIDE 0.9 % (FLUSH) 0.9 %
5-40 SYRINGE (ML) INJECTION PRN
Status: DISCONTINUED | OUTPATIENT
Start: 2024-12-19 | End: 2024-12-23 | Stop reason: HOSPADM

## 2024-12-19 RX ORDER — CLOPIDOGREL BISULFATE 75 MG/1
75 TABLET ORAL DAILY
Status: DISCONTINUED | OUTPATIENT
Start: 2024-12-19 | End: 2024-12-23 | Stop reason: HOSPADM

## 2024-12-19 RX ORDER — ACETAMINOPHEN 650 MG/1
650 SUPPOSITORY RECTAL EVERY 6 HOURS PRN
Status: DISCONTINUED | OUTPATIENT
Start: 2024-12-19 | End: 2024-12-23 | Stop reason: HOSPADM

## 2024-12-19 RX ADMIN — SODIUM CHLORIDE: 9 INJECTION, SOLUTION INTRAVENOUS at 11:24

## 2024-12-19 RX ADMIN — DEXTROSE AND SODIUM CHLORIDE: 5; 450 INJECTION, SOLUTION INTRAVENOUS at 15:46

## 2024-12-19 RX ADMIN — AMLODIPINE BESYLATE 5 MG: 5 TABLET ORAL at 20:34

## 2024-12-19 RX ADMIN — AZITHROMYCIN MONOHYDRATE 500 MG: 500 INJECTION, POWDER, LYOPHILIZED, FOR SOLUTION INTRAVENOUS at 11:41

## 2024-12-19 RX ADMIN — CEFTRIAXONE SODIUM 1000 MG: 1 INJECTION, POWDER, FOR SOLUTION INTRAMUSCULAR; INTRAVENOUS at 11:34

## 2024-12-19 RX ADMIN — CLOPIDOGREL BISULFATE 75 MG: 75 TABLET ORAL at 20:34

## 2024-12-19 RX ADMIN — ENOXAPARIN SODIUM 30 MG: 100 INJECTION SUBCUTANEOUS at 17:12

## 2024-12-19 RX ADMIN — DEXTROSE AND SODIUM CHLORIDE: 5; 450 INJECTION, SOLUTION INTRAVENOUS at 23:51

## 2024-12-19 ASSESSMENT — PAIN - FUNCTIONAL ASSESSMENT: PAIN_FUNCTIONAL_ASSESSMENT: NONE - DENIES PAIN

## 2024-12-19 NOTE — ED NOTES
Daughter and wife left bedside at this time. Daughter, Leslie [phone]. Lidia Ortiz, wife, [phone]

## 2024-12-19 NOTE — CARE COORDINATION
Case Management Assessment  Initial Evaluation    Date/Time of Evaluation: 12/19/2024 5:03 PM  Assessment Completed by: DAVID Stout, LSW    If patient is discharged prior to next notation, then this note serves as note for discharge by case management.    Patient Name: Cameron Ortiz                   YOB: 1935  Diagnosis: Encephalopathy acute [G93.40]  Acute cystitis without hematuria [N30.00]  Complicated UTI (urinary tract infection) [N39.0]  Pneumonia of left lower lobe due to infectious organism [J18.9]  Chronic kidney disease, unspecified CKD stage [N18.9]                   Date / Time: 12/19/2024  8:31 AM    Patient Admission Status: Inpatient   Readmission Risk (Low < 19, Mod (19-27), High > 27): Readmission Risk Score: 13.7    Current PCP: Jose Luis Cevallos APRN - CNP  PCP verified by CM? Yes    Chart Reviewed: Yes      History Provided by: Patient, Medical Record  Patient Orientation: Alert and Oriented, Person, Place, Situation    Patient Cognition: Alert    Hospitalization in the last 30 days (Readmission):  No    If yes, Readmission Assessment in CM Navigator will be completed.    Advance Directives:      Code Status: Full Code   Patient's Primary Decision Maker is: Named in Scanned ACP Document    Primary Decision Maker: Lidia Ortiz - Spouse - [phone]    Discharge Planning:    Patient lives with: Spouse/Significant Other Type of Home: House  Primary Care Giver: Self  Patient Support Systems include: Spouse/Significant Other, Children   Current Financial resources: Medicare  Current community resources: None  Current services prior to admission: Durable Medical Equipment            Current DME: Cane            Type of Home Care services:  None    ADLS  Prior functional level: Independent in ADLs/IADLs, Other (see comment) (Pt receives assistance from family as needed due to vertigo)  Current functional level: Independent in ADLs/IADLs, Other (see comment) (Pt  would be his decision maker with his children if needed. Pt reports that his medications are affordable with insurance. Pt identifies no concerns with housing, transportation or food security. SW and pt discuss discharge plans and pt intends to go home with spouse at discharge and feels that he will be able to manage. Will await therapy evals to see how pt does functionally as he has been dealing with vertigo and having some falls at home. Pt identifies no discharge needs or concerns at this time. SW following. DAVID Stout, LSW 12/19/2024     The Plan for Transition of Care is related to the following treatment goals of Encephalopathy acute [G93.40]  Acute cystitis without hematuria [N30.00]  Complicated UTI (urinary tract infection) [N39.0]  Pneumonia of left lower lobe due to infectious organism [J18.9]  Chronic kidney disease, unspecified CKD stage [N18.9]      The Patient and/or Patient Representative Agree with the Discharge Plan? yes     DAVID Stout, LANCEW  Case Management Department  Ph: [phone] Fax: [phone]

## 2024-12-19 NOTE — H&P
History and Physical    Patient:  Cameron Ortiz  MRN: [id number]    Chief Complaint:  hallucinations    History Obtained From:  patient, electronic medical record    PCP: Jose Luis Cevallos APRN - CNP    History of Present Illness:   The patient is a 89 y.o. male who presented with complaints of hallucinations that have been progressing. These are visual in nature.  Patient recently was placed on Bactrim DS for cellulitis of legs.  Patient reported over the past few days his hallucinations have been worsening. He complains of vertigo and falls at home.  He reported his dizziness has been ongoing for months and does follow with Neurology.  Complained of chills but no reported fevers.  No GI/ issues.  Denied SOB. Denied CP or palpitations.  During his evaluation he was found to have CARLO on CKD with BUN of 41 and Creatinine of 2.4.  Baseline BUN is 21-43 and Creatinine 1.26-2.1.  Troponin was elevated at 56 awaiting recheck.  Up from baseline of 40-47.  CBC unremarkable.  Urinalysis showed WBC 2-5, small leukocytes, negative nitrates.  Chest xray showed rigt pleural effusion with right parahilar opacity.  ER provider concerned for UTI and PN and started on Rocephin and Zithromax.  No ill contacts reported.     Past Medical History:        Diagnosis Date    Abnormal stress test 2017    Acute cystitis without hematuria 12/19/2024    Acute kidney injury superimposed on CKD (HCC) 12/19/2024    Arrhythmia     ASHD (arteriosclerotic heart disease)     T cardiology    Cerebral artery occlusion with cerebral infarction (HCC)     TIA    CKD (chronic kidney disease)     Closed traumatic fracture of right femur with routine healing 1955    trauma while in [employer], plate and scews placed and removed    COPD (chronic obstructive pulmonary disease) (HCC)     Elevated PSA     Enlarged prostate     Gallstones     Gout     History of kidney stones     Hx of blood clots     Hx of cardiac cath 11/02/2021    [facility]/

## 2024-12-20 LAB
ANION GAP SERPL CALCULATED.3IONS-SCNC: 14 MMOL/L (ref 9–16)
B PARAP IS1001 DNA NPH QL NAA+NON-PROBE: NOT DETECTED
B PERT DNA SPEC QL NAA+PROBE: NOT DETECTED
BASOPHILS # BLD: 0.04 K/UL (ref 0–0.2)
BASOPHILS NFR BLD: 1 % (ref 0–2)
BUN SERPL-MCNC: 37 MG/DL (ref 8–23)
BUN/CREAT SERPL: 17 (ref 9–20)
C PNEUM DNA NPH QL NAA+NON-PROBE: NOT DETECTED
CALCIUM SERPL-MCNC: 9 MG/DL (ref 8.6–10.4)
CHLORIDE SERPL-SCNC: 108 MMOL/L (ref 98–107)
CO2 SERPL-SCNC: 18 MMOL/L (ref 20–31)
CREAT SERPL-MCNC: 2.2 MG/DL (ref 0.7–1.2)
EKG ATRIAL RATE: 60 BPM
EKG ATRIAL RATE: 61 BPM
EKG P-R INTERVAL: 186 MS
EKG P-R INTERVAL: 212 MS
EKG Q-T INTERVAL: 438 MS
EKG Q-T INTERVAL: 470 MS
EKG QRS DURATION: 124 MS
EKG QRS DURATION: 156 MS
EKG QTC CALCULATION (BAZETT): 438 MS
EKG QTC CALCULATION (BAZETT): 477 MS
EKG R AXIS: 78 DEGREES
EKG R AXIS: 89 DEGREES
EKG T AXIS: -114 DEGREES
EKG T AXIS: 13 DEGREES
EKG VENTRICULAR RATE: 60 BPM
EKG VENTRICULAR RATE: 62 BPM
EOSINOPHIL # BLD: 0.13 K/UL (ref 0–0.44)
EOSINOPHILS RELATIVE PERCENT: 2 % (ref 1–4)
ERYTHROCYTE [DISTWIDTH] IN BLOOD BY AUTOMATED COUNT: 15.1 % (ref 11.8–14.4)
FLUAV RNA NPH QL NAA+NON-PROBE: NOT DETECTED
FLUBV RNA NPH QL NAA+NON-PROBE: NOT DETECTED
GFR, ESTIMATED: 27 ML/MIN/1.73M2
GLUCOSE SERPL-MCNC: 113 MG/DL (ref 74–99)
HADV DNA NPH QL NAA+NON-PROBE: NOT DETECTED
HCOV 229E RNA NPH QL NAA+NON-PROBE: NOT DETECTED
HCOV HKU1 RNA NPH QL NAA+NON-PROBE: NOT DETECTED
HCOV NL63 RNA NPH QL NAA+NON-PROBE: NOT DETECTED
HCOV OC43 RNA NPH QL NAA+NON-PROBE: NOT DETECTED
HCT VFR BLD AUTO: 40.8 % (ref 40.7–50.3)
HGB BLD-MCNC: 13.1 G/DL (ref 13–17)
HMPV RNA NPH QL NAA+NON-PROBE: NOT DETECTED
HPIV1 RNA NPH QL NAA+NON-PROBE: NOT DETECTED
HPIV2 RNA NPH QL NAA+NON-PROBE: NOT DETECTED
HPIV3 RNA NPH QL NAA+NON-PROBE: NOT DETECTED
HPIV4 RNA NPH QL NAA+NON-PROBE: NOT DETECTED
IMM GRANULOCYTES # BLD AUTO: 0.07 K/UL (ref 0–0.3)
IMM GRANULOCYTES NFR BLD: 1 %
LYMPHOCYTES NFR BLD: 1.23 K/UL (ref 1.1–3.7)
LYMPHOCYTES RELATIVE PERCENT: 16 % (ref 24–43)
M PNEUMO DNA NPH QL NAA+NON-PROBE: NOT DETECTED
MCH RBC QN AUTO: 27.3 PG (ref 25.2–33.5)
MCHC RBC AUTO-ENTMCNC: 32.1 G/DL (ref 28.4–34.8)
MCV RBC AUTO: 85 FL (ref 82.6–102.9)
MICROORGANISM SPEC CULT: NORMAL
MONOCYTES NFR BLD: 0.53 K/UL (ref 0.1–1.2)
MONOCYTES NFR BLD: 7 % (ref 3–12)
NEUTROPHILS NFR BLD: 73 % (ref 36–65)
NEUTS SEG NFR BLD: 5.75 K/UL (ref 1.5–8.1)
NRBC BLD-RTO: 0 PER 100 WBC
PLATELET # BLD AUTO: 206 K/UL (ref 138–453)
PMV BLD AUTO: 10.3 FL (ref 8.1–13.5)
POTASSIUM SERPL-SCNC: 4.4 MMOL/L (ref 3.7–5.3)
RBC # BLD AUTO: 4.8 M/UL (ref 4.21–5.77)
RSV RNA NPH QL NAA+NON-PROBE: NOT DETECTED
RV+EV RNA NPH QL NAA+NON-PROBE: NOT DETECTED
SARS-COV-2 RNA NPH QL NAA+NON-PROBE: NOT DETECTED
SODIUM SERPL-SCNC: 140 MMOL/L (ref 136–145)
SPECIMEN DESCRIPTION: NORMAL
SPECIMEN DESCRIPTION: NORMAL
WBC OTHER # BLD: 7.8 K/UL (ref 3.5–11.3)

## 2024-12-20 PROCEDURE — 97162 PT EVAL MOD COMPLEX 30 MIN: CPT

## 2024-12-20 PROCEDURE — 93010 ELECTROCARDIOGRAM REPORT: CPT | Performed by: FAMILY MEDICINE

## 2024-12-20 PROCEDURE — 97166 OT EVAL MOD COMPLEX 45 MIN: CPT

## 2024-12-20 PROCEDURE — 2580000003 HC RX 258: Performed by: NURSE PRACTITIONER

## 2024-12-20 PROCEDURE — 80048 BASIC METABOLIC PNL TOTAL CA: CPT

## 2024-12-20 PROCEDURE — 1200000000 HC SEMI PRIVATE

## 2024-12-20 PROCEDURE — 36415 COLL VENOUS BLD VENIPUNCTURE: CPT

## 2024-12-20 PROCEDURE — 6370000000 HC RX 637 (ALT 250 FOR IP): Performed by: NURSE PRACTITIONER

## 2024-12-20 PROCEDURE — 94761 N-INVAS EAR/PLS OXIMETRY MLT: CPT

## 2024-12-20 PROCEDURE — 6360000002 HC RX W HCPCS: Performed by: NURSE PRACTITIONER

## 2024-12-20 PROCEDURE — 6360000002 HC RX W HCPCS: Performed by: INTERNAL MEDICINE

## 2024-12-20 PROCEDURE — 97530 THERAPEUTIC ACTIVITIES: CPT

## 2024-12-20 PROCEDURE — 85025 COMPLETE CBC W/AUTO DIFF WBC: CPT

## 2024-12-20 RX ORDER — DEXTROSE MONOHYDRATE AND SODIUM CHLORIDE 5; .45 G/100ML; G/100ML
INJECTION, SOLUTION INTRAVENOUS CONTINUOUS
Status: DISCONTINUED | OUTPATIENT
Start: 2024-12-20 | End: 2024-12-21

## 2024-12-20 RX ORDER — LORAZEPAM 2 MG/ML
0.5 INJECTION INTRAMUSCULAR EVERY 4 HOURS PRN
Status: DISCONTINUED | OUTPATIENT
Start: 2024-12-20 | End: 2024-12-23 | Stop reason: HOSPADM

## 2024-12-20 RX ADMIN — CLOPIDOGREL BISULFATE 75 MG: 75 TABLET ORAL at 09:52

## 2024-12-20 RX ADMIN — ZINC SULFATE 220 MG (50 MG) CAPSULE 50 MG: CAPSULE at 09:51

## 2024-12-20 RX ADMIN — ENOXAPARIN SODIUM 30 MG: 100 INJECTION SUBCUTANEOUS at 09:52

## 2024-12-20 RX ADMIN — AMLODIPINE BESYLATE 5 MG: 5 TABLET ORAL at 09:52

## 2024-12-20 RX ADMIN — LORAZEPAM 0.5 MG: 2 INJECTION, SOLUTION INTRAMUSCULAR; INTRAVENOUS at 21:06

## 2024-12-20 RX ADMIN — ISOSORBIDE MONONITRATE 30 MG: 30 TABLET, EXTENDED RELEASE ORAL at 09:52

## 2024-12-20 RX ADMIN — DEXTROSE AND SODIUM CHLORIDE: 5; 450 INJECTION, SOLUTION INTRAVENOUS at 08:46

## 2024-12-20 RX ADMIN — DEXTROSE AND SODIUM CHLORIDE: 5; 450 INJECTION, SOLUTION INTRAVENOUS at 15:33

## 2024-12-20 RX ADMIN — METOPROLOL SUCCINATE 50 MG: 50 TABLET, EXTENDED RELEASE ORAL at 09:52

## 2024-12-20 NOTE — PROGRESS NOTES
Occupational Therapy  Facility/Department: [facility] MED SURG  Occupational Therapy Initial Assessment    Name: Cameron Ortiz  : 1935  MRN: [id number]  Date of Service: 2024    Discharge Recommendations:  Continue to assess pending progress          Patient Diagnosis(es): The primary encounter diagnosis was Encephalopathy acute. Diagnoses of Pneumonia of left lower lobe due to infectious organism, Acute cystitis without hematuria, and Chronic kidney disease, unspecified CKD stage were also pertinent to this visit.  Past Medical History:  has a past medical history of Abnormal stress test, Acute cystitis without hematuria, Acute kidney injury superimposed on CKD (formerly [facility]), Arrhythmia, ASHD (arteriosclerotic heart disease), Cerebral artery occlusion with cerebral infarction (formerly [facility]), CKD (chronic kidney disease), Closed traumatic fracture of right femur with routine healing, COPD (chronic obstructive pulmonary disease) (formerly [facility]), Elevated PSA, Enlarged prostate, Gallstones, Gout, History of kidney stones, Hx of blood clots, Hx of cardiac cath, Hydrocele in adult, Hyperlipidemia, Hypertension, Renal stones, Trigger finger, Vertigo, Visual hallucinations, and Wrist fracture, left.  Past Surgical History:  has a past surgical history that includes Lithotripsy; Appendectomy; Prostate biopsy (10/22/1998, 2004); cystourethroscopy; Hydrocele surgery (2012); Total hip arthroplasty (Right, ); Femur Surgery (); Cardiac catheterization (Left, 2019); joint replacement; fracture surgery; Coronary angioplasty with stent (2021); Cardiac procedure (N/A, 2023); and ep device procedure (Left, 2024).    Treatment Diagnosis: Weakness      Assessment  Performance deficits / Impairments: Decreased functional mobility ;Decreased strength;Decreased endurance;Decreased ADL status;Decreased safe awareness;Decreased high-level IADLs;Decreased cognition;Decreased balance  Assessment: Pt. is 89 y.o. male  responded to social questions, but reports holding a paper in his hand, however no paper is evident. May be questionable historian.    Objective        Safety Devices  Type of Devices: All fall risk precautions in place;Bed alarm in place;Patient at risk for falls;Left in bed     AROM: Generally decreased, functional  PROM: Generally decreased, functional  Strength: Generally decreased, functional  Coordination: Generally decreased, functional  Tone: Normal  Sensation: Intact  ADL  Feeding: Setup;Stand by assistance  Grooming: Setup;Contact guard assistance  Grooming Skilled Clinical Factors: My be limited due to cognition.  UE Bathing: Setup;Contact guard assistance  LE Bathing: Setup;Moderate assistance  UE Dressing: Setup;Stand by assistance  LE Dressing: Setup;Maximum assistance  Putting On/Taking Off Footwear: Maximum assistance  Toileting: Contact guard assistance  Functional Mobility: Contact guard assistance  Functional Mobility Skilled Clinical Factors: Pt. transferred supine -> EOB with min assist and EOB -> supine with SBA.           Vision  Vision: Within Functional Limits  Hearing  Hearing: Exceptions to WFL  Hearing Exceptions: Hard of hearing/hearing concerns  Cognition  Overall Cognitive Status: Exceptions  Cognition Comment: Disoriented; patient is talking to people and objects not present in room.  Orientation                    Education Given To: Patient  Education Provided: Role of Therapy;Plan of Care  Education Method: Verbal  Barriers to Learning: Cognition                 AM-PAC - ADL  AM-PAC Daily Activity - Inpatient   How much help is needed for putting on and taking off regular lower body clothing?: A Lot  How much help is needed for bathing (which includes washing, rinsing, drying)?: A Lot  How much help is needed for toileting (which includes using toilet, bedpan, or urinal)?: A Little  How much help is needed for putting on and taking off regular upper body clothing?: A Little  How

## 2024-12-20 NOTE — FLOWSHEET NOTE
Awake, resting in bed. Vitals checked. Oriented to name only. Hallucinating , sees little boys and women on the ceiling. Attempted to reorient with no success. Warm blanket given. Call light within reach. Bed alarm on for safety

## 2024-12-20 NOTE — PROGRESS NOTES
Writer entered room to give pt night medications. Pt asked who those kids are running in his room and that they have knives. Writer reoriented pt and stated there are no children in his room. Pt then asked writer if he drove here to his house. Writer reoriented pt again that he is in the hospital. Care ongoing.

## 2024-12-20 NOTE — PROGRESS NOTES
Spiritual Services Interventions  0315/0315-01   12/20/2024  Isai Steinberg    Cameron ALMODOVAR Ortiz  89 y.o. year old male    Encounter Summary  Encounter Overview/Reason: (P) Spiritual/Emotional Needs  Service Provided For: (P) Patient and family together  Referral/Consult From: (P) Rounding  Support System: (P) Spouse, Family members  Last Encounter : (P) 12/20/24  Complexity of Encounter: (P) Moderate  Begin Time: (P) 1030  End Time : (P) 1100  Total Time Calculated: (P) 30 min     Spiritual/Emotional needs  Type: (P) Spiritual Support                    Assessment/Intervention/Outcome  Assessment: (P) Calm  Intervention: (P) Discussed belief system/[employer]aritan practices/ke, Discussed illness injury and it’s impact, Active listening, Prayer (assurance of)/[place]  Outcome: (P) Encouraged, Engaged in conversation

## 2024-12-20 NOTE — PROGRESS NOTES
Needs:  Energy Requirements Based On: Kcal/kg  Weight Used for Energy Requirements: Current  Energy (kcal/day): 5581-5345 (23-28)  Weight Used for Protein Requirements: Current  Protein (g/day): 76-90 (1.1-1.3)  Method Used for Fluid Requirements: 1 ml/kcal  Fluid (ml/day): 2000    Nutrition Diagnosis:   Predicted inadequate energy intake related to cognitive or neurological impairment as evidenced by other (patient report)    Lab Results   Component Value Date     12/20/2024    K 4.4 12/20/2024     (H) 12/20/2024    CO2 18 (L) 12/20/2024    BUN 37 (H) 12/20/2024    CREATININE 2.2 (H) 12/20/2024    GLUCOSE 113 (H) 12/20/2024    CALCIUM 9.0 12/20/2024    BILITOT 0.4 12/19/2024    ALKPHOS 104 12/19/2024    AST 34 12/19/2024    ALT 18 12/19/2024    LABGLOM 27 (L) 12/20/2024    GFRAA >60 11/09/2021       Hemoglobin A1C   Date Value Ref Range Status   10/19/2021 5.8 4.4 - 6.4 % Final     Comment:     NOTE                   ADA Guidelines           Result                  HgbA1c        ------------           ---------------        Normal :               less than 5.7 %        Prediabetes :          5.7 %  to 6.4 %        Diabetes :             > 6.4 %  Use with caution in patients with abnormal hemoglobin variants as  the half-life of red blood cells and in vivo glycation rates are  affected.       Lab Results   Component Value Date    VITD25 38 09/06/2022     Nutrition Interventions:   Food and/or Nutrient Delivery: Continue Current Diet, Continue Oral Nutrition Supplement  Nutrition Education/Counseling: Education/Counseling not appropriate  Coordination of Nutrition Care: Continue to monitor while inpatient  Plan of Care discussed with: patient    Goals:  Goals: Meet at least 75% of estimated needs  Type of Goal: New goal  Previous Goal Met: New Goal    Nutrition Monitoring and Evaluation:   Behavioral-Environmental Outcomes: Other (cognition)  Food/Nutrient Intake Outcomes: Food and Nutrient Intake,

## 2024-12-20 NOTE — CARE COORDINATION
The wife and daughter are requesting short SNF stay for therapy.  Their first choice is the [facility] and the second choice is [facility].  Referral made to the [facility].  TONI Coleman

## 2024-12-20 NOTE — PROGRESS NOTES
X1;Minimum assistance  Sit to Supine: Assist X1;Stand-by assistance  Balance  Standing: Impaired  Standing - Static: Fair  Standing - Dynamic: Fair  Transfer Training  Transfer Training: Yes  Overall Level of Assistance: Assist X2;Contact-guard assistance  Interventions: Demonstration;Verbal cues (Cues for optimal hand placement, slow positional changes)  Sit to Stand: Assist X2;Contact-guard assistance  Stand to Sit: Assist X2;Contact-guard assistance  Gait  Gait Training: No (pt declined gait training despite encouragement from staff.)           Safety Devices  Type of Devices: All fall risk precautions in place;Bed alarm in place;Call light within reach;Gait belt;Telesitter in use;Left in bed;Patient at risk for falls      Goals  Short Term Goals  Time Frame for Short Term Goals: 20 days  Short Term Goal 1: Patient to complete all transfers with SUP and no LOB to decrease fall risk.  Short Term Goal 2: Patient to ambulate 50ftx2 with QC or LRAD, SUP and no LOB or fatigue for improved mobility.  Short Term Goal 3: Patient to tolerate 20-30 min of ther ex/act for improved strength.  Short Term Goal 4: Patient to ascend/descend 2 steps with HRx1 and CGA with no LOB to safely enter his home.    Education  Patient Education  Education Given To: Patient  Education Provided: Transfer Training;Fall Prevention Strategies;Role of Therapy  Education Provided Comments: Education on importance of participation, continue to educate  Education Method: Verbal;Demonstration  Barriers to Learning: Cognition  Education Outcome: Continued education needed;Unable to demonstrate understanding    Therapy Time   Individual Concurrent Group Co-treatment   Time In 1344         Time Out 1400         Minutes 16           Salena Adams, PTA [id number]

## 2024-12-20 NOTE — DISCHARGE INSTR - COC
Continuity of Care Form    Patient Name: Cameron Ortiz   :  1935  MRN:  [id number]    Admit date:  2024  Discharge date:  24    Code Status Order: DNR-CCA   Advance Directives:   Advance Care Flowsheet Documentation             Admitting Physician:  David Otto MD  PCP: Jose Luis Cevallos, APRN - CNP    Discharging Nurse: andrea [facility] Unit/Room[facility] 0315/0315-01  Discharging Unit Phone Number:     Emergency Contact:   Extended Emergency Contact Information  Primary Emergency Contact: Lidia Ortiz  Address: [address] [place] [address]  Home Phone: [phone]  Relation: Spouse    Past Surgical History:  Past Surgical History:   Procedure Laterality Date    APPENDECTOMY      21 yo    CARDIAC CATHETERIZATION Left 2019    Right [facility]    CARDIAC PROCEDURE N/A 2023    Left heart cath / coronary angiography performed by Calvin Posadas MD at [facility] CARDIAC CATH/IR LAB    CORONARY ANGIOPLASTY WITH STENT PLACEMENT  2021    CYSTOURETHROSCOPY      EP DEVICE PROCEDURE Left 2024    Insert PPM dual performed by Rudy Marcus MD at [facility] CARDIAC CATH LAB    FEMUR SURGERY      plate and screws s/p trauma  and taken out in     FRACTURE SURGERY      HYDROCELE EXCISION  2012    JOINT REPLACEMENT      LITHOTRIPSY      PROSTATE BIOPSY  10/22/1998, 2004    TOTAL HIP ARTHROPLASTY Right        Immunization History:   Immunization History   Administered Date(s) Administered    COVID-19, MODERNA BLUE border, Primary or Immunocompromised, (age 12y+), IM, 100 mcg/0.5mL 2021, 2021    Influenza Virus Vaccine 2020    Influenza, AFLURIA (age 3 y+), FLUZONE, (age 6 mo+), Quadv MDV, 0.5mL 10/25/2018    Influenza, FLUAD, (age 65 y+), IM, Trivalent PF, 0.5mL 10/25/2018    Influenza, FLUARIX, FLULAVAL, FLUZONE (age 6 mo+) and AFLURIA, (age 3 y+),

## 2024-12-20 NOTE — PROGRESS NOTES
Writer to bedside to complete morning assessment. Upon entry to room, pt awake and sitting in the chair, respirations even and unlabored while on room air; eating his breakfast. Pt continues to speak about being on a [place] the [place] and using the cranes at the hospital, stopping to pick people up in [place] if they need. Chair alarm is on and he is near the nurses' station. Vitals obtained and assessment completed, see flow sheet for details. Pt denies needs from writer at this time. Call light in reach. Care ongoing.

## 2024-12-20 NOTE — FLOWSHEET NOTE
To bathroom with assist. Unsteady on feet. Urinated and returned to bed. Bed alarm on for safety. Continues confused, wants to go home. Nurse 1:1 with patient currently for safety.

## 2024-12-20 NOTE — FLOWSHEET NOTE
Patient trying to get out of bed. Oriented to name only. States he's going home. Ambulated in halls with 2 assist . Unsteady on feet. In recliner at bedside. Chair alarm on for safety. Continues confused, argues with staff. Insists on going home. Patients wife Lidia called. Message left to call staff her at hospital.

## 2024-12-20 NOTE — PROGRESS NOTES
Progress Note    SUBJECTIVE:    Patient seen for f/u of Acute kidney injury superimposed on CKD (HCC).  He resting in bed no distress. Continues with confusion and hallucinations.  Afebrile.  Pain at times to right ribs     ROS:   Constitutional: negative  for fevers, and negative for chills.  Respiratory: negative for shortness of breath, negative for cough, and negative for wheezing  Cardiovascular: negative for chest pain, and negative for palpitations  Gastrointestinal: negative for abdominal pain, negative for nausea,negative for vomiting, negative for diarrhea, and negative for constipation     All other systems were reviewed with the patient and are negative unless otherwise stated in HPI      OBJECTIVE:      Vitals:   Vitals:    12/19/24 2030   BP: 127/76   Pulse: 65   Resp:    Temp:    SpO2:      Weight - Scale: 68.9 kg (151 lb 14.4 oz)   Height: 172.7 cm (5' 8\")     Weight  Wt Readings from Last 3 Encounters:   12/20/24 68.9 kg (151 lb 14.4 oz)   12/13/24 68 kg (150 lb)   12/11/24 68.9 kg (152 lb)     Body mass index is 23.1 kg/m².    24HR INTAKE/OUTPUT:      Intake/Output Summary (Last 24 hours) at 12/20/2024 0743  Last data filed at 12/20/2024 0514  Gross per 24 hour   Intake 1913.7 ml   Output 275 ml   Net 1638.7 ml     -----------------------------------------------------------------  Exam:    GEN:    Awake, alert and oriented to person only.   EYES:  EOMI, pupils equal   NECK: Supple. No lymphadenopathy.  No carotid bruit  CVS:    regular rate and rhythm, no audible murmur  PULM:  CTA, no wheezes, rales or rhonchi, no acute respiratory distress  ABD:    Bowels sounds normal.  Abdomen is soft.  No distention.  no tenderness to palpation.   EXT:   no edema bilaterally .  No calf tenderness.   NEURO: Moves all extremities.  Motor and sensory are grossly intact  SKIN:  No rashes.  No skin lesions.    -----------------------------------------------------------------    Diagnostic Data:      Complete Blood

## 2024-12-20 NOTE — PROGRESS NOTES
Physical Therapy  Facility/Department: [facility] MED SURG  Physical Therapy Initial Assessment    Name: Cameron Ortiz  : 1935  MRN: [id number]  Date of Service: 2024    Discharge Recommendations:  Continue to assess pending progress          Patient Diagnosis(es): The primary encounter diagnosis was Encephalopathy acute. Diagnoses of Pneumonia of left lower lobe due to infectious organism, Acute cystitis without hematuria, and Chronic kidney disease, unspecified CKD stage were also pertinent to this visit.  Past Medical History:  has a past medical history of Abnormal stress test, Acute cystitis without hematuria, Acute kidney injury superimposed on CKD ([facility]), Arrhythmia, ASHD (arteriosclerotic heart disease), Cerebral artery occlusion with cerebral infarction ([facility]), CKD (chronic kidney disease), Closed traumatic fracture of right femur with routine healing, COPD (chronic obstructive pulmonary disease) ([facility]), Elevated PSA, Enlarged prostate, Gallstones, Gout, History of kidney stones, Hx of blood clots, Hx of cardiac cath, Hydrocele in adult, Hyperlipidemia, Hypertension, Renal stones, Trigger finger, Vertigo, Visual hallucinations, and Wrist fracture, left.  Past Surgical History:  has a past surgical history that includes Lithotripsy; Appendectomy; Prostate biopsy (10/22/1998, 2004); cystourethroscopy; Hydrocele surgery (2012); Total hip arthroplasty (Right, ); Femur Surgery (); Cardiac catheterization (Left, 2019); joint replacement; fracture surgery; Coronary angioplasty with stent (2021); Cardiac procedure (N/A, 2023); and ep device procedure (Left, 2024).    Assessment  Assessment: Patient is 89 year old male with dx of acute encephalopathy who presents with decreased B LE strength, decreased functional mobility and endurance and decreased safety and balance during transfers and ambulation and would benefit from physical therapy to address all concerns and

## 2024-12-20 NOTE — PROGRESS NOTES
Writer called virtual  and spoke with Simona - pt is very impulsive and unsteady on his feet - up to the chair and up to the bathroom without using his call light. Has a history of falling and has been confused this morning. Care ongoing, call light within reach.

## 2024-12-20 NOTE — PLAN OF CARE
Problem: Chronic Conditions and Co-morbidities  Goal: Patient's chronic conditions and co-morbidity symptoms are monitored and maintained or improved  Outcome: Progressing  Flowsheets (Taken 12/19/2024 1836)  Care Plan - Patient's Chronic Conditions and Co-Morbidity Symptoms are Monitored and Maintained or Improved: Monitor and assess patient's chronic conditions and comorbid symptoms for stability, deterioration, or improvement     Problem: Discharge Planning  Goal: Discharge to home or other facility with appropriate resources  Outcome: Progressing  Flowsheets (Taken 12/19/2024 1836)  Discharge to home or other facility with appropriate resources: Identify barriers to discharge with patient and caregiver     Problem: Safety - Adult  Goal: Free from fall injury  Outcome: Progressing  Flowsheets (Taken 12/20/2024 0233)  Free From Fall Injury: Instruct family/caregiver on patient safety     Problem: Skin/Tissue Integrity  Goal: Absence of new skin breakdown  Description: 1.  Monitor for areas of redness and/or skin breakdown  2.  Assess vascular access sites hourly  3.  Every 4-6 hours minimum:  Change oxygen saturation probe site  4.  Every 4-6 hours:  If on nasal continuous positive airway pressure, respiratory therapy assess nares and determine need for appliance change or resting period.  Outcome: Progressing     Problem: ABCDS Injury Assessment  Goal: Absence of physical injury  Outcome: Progressing  Flowsheets (Taken 12/20/2024 0233)  Absence of Physical Injury: Implement safety measures based on patient assessment

## 2024-12-21 LAB
ANION GAP SERPL CALCULATED.3IONS-SCNC: 10 MMOL/L (ref 9–16)
BASOPHILS # BLD: 0.05 K/UL (ref 0–0.2)
BASOPHILS NFR BLD: 1 % (ref 0–2)
BUN SERPL-MCNC: 32 MG/DL (ref 8–23)
BUN/CREAT SERPL: 16 (ref 9–20)
CALCIUM SERPL-MCNC: 8.9 MG/DL (ref 8.6–10.4)
CHLORIDE SERPL-SCNC: 109 MMOL/L (ref 98–107)
CO2 SERPL-SCNC: 18 MMOL/L (ref 20–31)
CREAT SERPL-MCNC: 2 MG/DL (ref 0.7–1.2)
EOSINOPHIL # BLD: 0.12 K/UL (ref 0–0.44)
EOSINOPHILS RELATIVE PERCENT: 2 % (ref 1–4)
ERYTHROCYTE [DISTWIDTH] IN BLOOD BY AUTOMATED COUNT: 15.2 % (ref 11.8–14.4)
GFR, ESTIMATED: 31 ML/MIN/1.73M2
GLUCOSE SERPL-MCNC: 85 MG/DL (ref 74–99)
HCT VFR BLD AUTO: 36.8 % (ref 40.7–50.3)
HGB BLD-MCNC: 11.9 G/DL (ref 13–17)
IMM GRANULOCYTES # BLD AUTO: 0.06 K/UL (ref 0–0.3)
IMM GRANULOCYTES NFR BLD: 1 %
LYMPHOCYTES NFR BLD: 1.35 K/UL (ref 1.1–3.7)
LYMPHOCYTES RELATIVE PERCENT: 19 % (ref 24–43)
MCH RBC QN AUTO: 27.2 PG (ref 25.2–33.5)
MCHC RBC AUTO-ENTMCNC: 32.3 G/DL (ref 28.4–34.8)
MCV RBC AUTO: 84 FL (ref 82.6–102.9)
MONOCYTES NFR BLD: 0.6 K/UL (ref 0.1–1.2)
MONOCYTES NFR BLD: 8 % (ref 3–12)
NEUTROPHILS NFR BLD: 69 % (ref 36–65)
NEUTS SEG NFR BLD: 5.07 K/UL (ref 1.5–8.1)
NRBC BLD-RTO: 0 PER 100 WBC
PLATELET # BLD AUTO: 179 K/UL (ref 138–453)
PMV BLD AUTO: 10 FL (ref 8.1–13.5)
POTASSIUM SERPL-SCNC: 4.9 MMOL/L (ref 3.7–5.3)
RBC # BLD AUTO: 4.38 M/UL (ref 4.21–5.77)
SODIUM SERPL-SCNC: 137 MMOL/L (ref 136–145)
WBC OTHER # BLD: 7.3 K/UL (ref 3.5–11.3)

## 2024-12-21 PROCEDURE — 97110 THERAPEUTIC EXERCISES: CPT

## 2024-12-21 PROCEDURE — 85025 COMPLETE CBC W/AUTO DIFF WBC: CPT

## 2024-12-21 PROCEDURE — 80048 BASIC METABOLIC PNL TOTAL CA: CPT

## 2024-12-21 PROCEDURE — 1200000000 HC SEMI PRIVATE

## 2024-12-21 PROCEDURE — 94761 N-INVAS EAR/PLS OXIMETRY MLT: CPT

## 2024-12-21 PROCEDURE — 2500000003 HC RX 250 WO HCPCS: Performed by: NURSE PRACTITIONER

## 2024-12-21 PROCEDURE — 6370000000 HC RX 637 (ALT 250 FOR IP): Performed by: NURSE PRACTITIONER

## 2024-12-21 PROCEDURE — 97116 GAIT TRAINING THERAPY: CPT

## 2024-12-21 PROCEDURE — 6360000002 HC RX W HCPCS

## 2024-12-21 PROCEDURE — 36415 COLL VENOUS BLD VENIPUNCTURE: CPT

## 2024-12-21 PROCEDURE — 6360000002 HC RX W HCPCS: Performed by: NURSE PRACTITIONER

## 2024-12-21 RX ORDER — LORAZEPAM 2 MG/ML
INJECTION INTRAMUSCULAR
Status: COMPLETED
Start: 2024-12-21 | End: 2024-12-21

## 2024-12-21 RX ADMIN — SODIUM CHLORIDE, PRESERVATIVE FREE 10 ML: 5 INJECTION INTRAVENOUS at 10:06

## 2024-12-21 RX ADMIN — AMLODIPINE BESYLATE 5 MG: 5 TABLET ORAL at 10:04

## 2024-12-21 RX ADMIN — ZINC SULFATE 220 MG (50 MG) CAPSULE 50 MG: CAPSULE at 10:04

## 2024-12-21 RX ADMIN — METOPROLOL SUCCINATE 50 MG: 50 TABLET, EXTENDED RELEASE ORAL at 10:04

## 2024-12-21 RX ADMIN — SODIUM CHLORIDE, PRESERVATIVE FREE 10 ML: 5 INJECTION INTRAVENOUS at 00:55

## 2024-12-21 RX ADMIN — ISOSORBIDE MONONITRATE 30 MG: 30 TABLET, EXTENDED RELEASE ORAL at 10:04

## 2024-12-21 RX ADMIN — CLOPIDOGREL BISULFATE 75 MG: 75 TABLET ORAL at 10:04

## 2024-12-21 RX ADMIN — LORAZEPAM 0.5 MG: 2 INJECTION, SOLUTION INTRAMUSCULAR; INTRAVENOUS at 00:53

## 2024-12-21 RX ADMIN — SODIUM CHLORIDE, PRESERVATIVE FREE 10 ML: 5 INJECTION INTRAVENOUS at 20:32

## 2024-12-21 RX ADMIN — ENOXAPARIN SODIUM 30 MG: 100 INJECTION SUBCUTANEOUS at 10:05

## 2024-12-21 NOTE — PLAN OF CARE
Problem: Chronic Conditions and Co-morbidities  Goal: Patient's chronic conditions and co-morbidity symptoms are monitored and maintained or improved  12/21/2024 0736 by Ekta Baltazar RN  Outcome: Progressing     Problem: Discharge Planning  Goal: Discharge to home or other facility with appropriate resources  12/21/2024 0736 by Ekta Baltazar RN  Outcome: Progressing     Problem: Safety - Adult  Goal: Free from fall injury  12/21/2024 0736 by Ekta Baltazar RN  Outcome: Progressing     Problem: Skin/Tissue Integrity  Goal: Absence of new skin breakdown  Description: 1.  Monitor for areas of redness and/or skin breakdown  2.  Assess vascular access sites hourly  3.  Every 4-6 hours minimum:  Change oxygen saturation probe site  4.  Every 4-6 hours:  If on nasal continuous positive airway pressure, respiratory therapy assess nares and determine need for appliance change or resting period.  Outcome: Progressing     Problem: ABCDS Injury Assessment  Goal: Absence of physical injury  Outcome: Progressing     Problem: Nutrition Deficit:  Goal: Optimize nutritional status  Outcome: Progressing

## 2024-12-21 NOTE — PROGRESS NOTES
Pt is resting in bed on right side, wife at bedside and family on sofa. Pt is alert and oriented to self and month. Telesitter remains in place, 1:1 continues. Pt is rambling with flight of ideas at times. Cooperative and pleasant. VS and assessment complete. Bed alarm on and call light In reach.

## 2024-12-21 NOTE — PROGRESS NOTES
Lloyd Wade M.D.  Internal Medicine Progress Note    Patient: Cameron Ortiz  Date of Admission: 12/19/2024  8:31 AM  Date of Evaluation: 12/21/2024      SUBJECTIVE:    Cameron Ortiz is a 89 y.o. male who was seen today for follow up of Acute kidney injury superimposed on CKD (HCC).  Per nursing he was confused overnight and attempted to crawl out of bed several times.  He had 1:1 supervisition and telesitter.  He did require Ativan for agitation.   He  is still quite confused this AM .  He is sitting up in chair with daughter at bedside helping him eat breakfast.      ROS:   Constitutional: negative  for fevers, and negative for chills.  Respiratory: negative for shortness of breath, negative for cough, and negative for wheezing  Cardiovascular: negative for chest pain, and negative for palpitations  Gastrointestinal: negative for abdominal pain, negative for nausea,negative for vomiting, negative for diarrhea, and negative for constipation     All other systems were reviewed with the patient and are negative unless otherwise stated in HPI    -----------------------------------------------------------------  OBJECTIVE:  Vitals:   Temp: (!) 96.6 °F (35.9 °C)  BP: 135/75  Respirations: 18  Pulse: 62  SpO2: 94 % on room air    Weight  Wt Readings from Last 3 Encounters:   12/21/24 69.4 kg (153 lb 0.6 oz)   12/13/24 68 kg (150 lb)   12/11/24 68.9 kg (152 lb)     Body mass index is 23.27 kg/m².    24HR INTAKE/OUTPUT:      Intake/Output Summary (Last 24 hours) at 12/21/2024 0748  Last data filed at 12/21/2024 0315  Gross per 24 hour   Intake 4174.36 ml   Output --   Net 4174.36 ml       Exam:  GEN:    Awake and alert.  Oriented to self this AM.  He knows he's at [facility] but states he lives on \"[address] but actually lives on [address]  EYES:  EOMI, pupils equal   NECK: Supple. No lymphadenopathy.  No carotid bruit  CVS:    regular rate and rhythm, no audible murmur  PULM:  CTA, no wheezes, rales or

## 2024-12-21 NOTE — PLAN OF CARE
Problem: Chronic Conditions and Co-morbidities  Goal: Patient's chronic conditions and co-morbidity symptoms are monitored and maintained or improved  Outcome: Progressing  Flowsheets (Taken 12/20/2024 1940)  Care Plan - Patient's Chronic Conditions and Co-Morbidity Symptoms are Monitored and Maintained or Improved:   Monitor and assess patient's chronic conditions and comorbid symptoms for stability, deterioration, or improvement   Collaborate with multidisciplinary team to address chronic and comorbid conditions and prevent exacerbation or deterioration     Problem: Discharge Planning  Goal: Discharge to home or other facility with appropriate resources  Outcome: Progressing  Flowsheets (Taken 12/20/2024 1940)  Discharge to home or other facility with appropriate resources:   Identify barriers to discharge with patient and caregiver   Refer to discharge planning if patient needs post-hospital services based on physician order or complex needs related to functional status, cognitive ability or social support system     Problem: Safety - Adult  Goal: Free from fall injury  Outcome: Progressing  Flowsheets (Taken 12/20/2024 1940)  Free From Fall Injury: Instruct family/caregiver on patient safety

## 2024-12-21 NOTE — PROGRESS NOTES
[facility] PCT asked writer about if patient had any current prn medication patient can receive for agitation. Family is at bedside and requesting something for agitation d/t patient attempting to get out and pulling at lines. No medications noted for agitation. Writer spoke to primary nurse who verified information.    Writer blair served Dr. Wade at this time, ordered Ativan IV 0.5mg x4hrs prn for agitation.

## 2024-12-21 NOTE — PROGRESS NOTES
Physical Therapy  Facility/Department: [facility] MED SURG  Daily Treatment Note  NAME: Cameron Ortiz  : 1935  MRN: [id number]    Date of Service: 2024    Discharge Recommendations:  Continue to assess pending progress     Patient Diagnosis(es): The primary encounter diagnosis was Encephalopathy acute. Diagnoses of Pneumonia of left lower lobe due to infectious organism, Acute cystitis without hematuria, and Chronic kidney disease, unspecified CKD stage were also pertinent to this visit.    Assessment  Assessment: Pt. completed seated BLE therex x15 in all planes of motion. Transfers:CGAx1-2 for safety. Pt. ambulated 20ftx1 with FWW and CGAx1-2. Slow cadance noted with decreased BLE step length. Pt. is unsteady on feet and had mild LOB throughout ambulation with self correction. Will continue to progress.  Activity Tolerance: Patient limited by fatigue;Patient limited by endurance;Treatment limited secondary to decreased cognition    Plan  Physical Therapy Plan  General Plan: 2 times a day 7 days a week  Specific Instructions for Next Treatment: Once daily on weekends  Current Treatment Recommendations: Strengthening;ROM;Balance training;Functional mobility training;Transfer training;Neuromuscular re-education;Stair training;Gait training;Home exercise program;Safety education & training;Patient/Caregiver education & training;Manual;Therapeutic activities;Endurance training    Restrictions  Restrictions/Precautions  Restrictions/Precautions: Bed Alarm, Fall Risk     Subjective   Orientation  Overall Orientation Status: Within Functional Limits    Objective  Bed Mobility Training  Bed Mobility Training: No  Transfer Training  Transfer Training: Yes  Overall Level of Assistance: Assist X2;Contact-guard assistance;Assist X1  Interventions: Demonstration;Verbal cues  Sit to Stand: Assist X2;Contact-guard assistance;Assist X1  Stand to Sit: Assist X2;Contact-guard assistance;Assist X1  Gait  Gait Training:

## 2024-12-21 NOTE — PROGRESS NOTES
Ativan 0.5mg IV prn given at this time. Primary nurse Aishwarya KEATING updated. Family at bedside.

## 2024-12-21 NOTE — PROGRESS NOTES
Pt restless, attempting to get out of bed to use the urinal unassisted. Refusing to follow instructions of staff to ensure safety. Pt became agitated from not being allowed to get up unassisted. Two RN's assisted pt to standing position at the bedside to use urinal. Pt only dribbled. Pull up changed. Pt then refused to sit back in bed. Wanted to sit and dangle on the edge of the bed. Pt only had a 1:1  in the room so pt was given the choice to lay back in bed or get into the chair. Pt accepted getting into the chair. Chair alarm on. Telesitter is on and 1:1  remain at bedside.

## 2024-12-21 NOTE — PROGRESS NOTES
Assessment completed with pt in bed. Pt alert and oriented to self only. Pt stated it was the year 1900 and he was in a shuttle in [place] when asked. Pt able to verbalize his birthday. Resting quietly in bed, and was pleasant and cooperative with assessment and vital signs. Bed alarm on. Call light in reach. Video monitoring and 1:1 remain in place for safety.

## 2024-12-21 NOTE — PROGRESS NOTES
Pt is resting in bed quietly at this time. Family remains at bedside, 1:1 and video monitoring maintained. Bed alarm on and call light in reach,

## 2024-12-21 NOTE — PROGRESS NOTES
Occupational Therapy  Facility/Department: [facility] MED SURG  Daily Treatment Note  NAME: Cameron Ortiz  : 1935  MRN: [id number]    Date of Service: 2024    Discharge Recommendations:  Continue to assess pending progress         Patient Diagnosis(es): The primary encounter diagnosis was Encephalopathy acute. Diagnoses of Pneumonia of left lower lobe due to infectious organism, Acute cystitis without hematuria, and Chronic kidney disease, unspecified CKD stage were also pertinent to this visit.     Assessment   Activity Tolerance: Patient limited by fatigue;Patient limited by endurance;Treatment limited secondary to decreased cognition  Discharge Recommendations: Continue to assess pending progress     Plan  Occupational Therapy Plan  Times Per Day: Once a day  Days Per Week: 7 Days  Current Treatment Recommendations: Strengthening;ROM;Balance training;Functional mobility training;Endurance training;Cognitive reorientation;Safety education & training;Equipment evaluation, education, & procurement;Patient/Caregiver education & training;Self-Care / ADL    Restrictions  Restrictions/Precautions  Restrictions/Precautions: Bed Alarm;Fall Risk    Subjective  Subjective  Subjective: Pt sitting up in bedside chair upon arrival. Pt agreed to participate in therapy session.  Pain: Pt had no complaints of pain.          Objective  Vitals           ADL  Functional Mobility: Contact guard assistance  Functional Mobility Skilled Clinical Factors: CGA to complete func mob with quad cane  Additional Comments: CGA to complete sit to stand from recliner.  Product Used : Bath wipes  OT Exercises  Exercise Treatment: Pt completed BUE ther ex x 5 planes x 15 reps x 1 set to increase UE strength and endurance in order to ease completion of ADL tasks. Pt required RBs as needed secondary to fatigue.     Safety Devices  Type of Devices: All fall risk precautions in place;Call light within reach;Left in chair;Chair alarm in

## 2024-12-21 NOTE — PROGRESS NOTES
Staff 1:1  left bedside. Daughter who is a nurse, is at bedside until 1700. Telesitter continues as well. Pt currently hallucinating, seeing kittens in the room. Pt redirectable via daughter but is not easily redirected by staff. Remains up in the chair per his request. Position changes when he stands up to use the urinal. Call light in reach. Chair alarm in reach.

## 2024-12-21 NOTE — PROGRESS NOTES
Pt attempting to crawl out of bed multiple times. Telesitter alerts staff x2. Pt is agitated and determined to crawl out of bed. Pt medicated per order and incontinence care provided. 1:1 and telesitter maintained. Bed alarm on.

## 2024-12-22 LAB
ANION GAP SERPL CALCULATED.3IONS-SCNC: 11 MMOL/L (ref 9–16)
BASOPHILS # BLD: 0.06 K/UL (ref 0–0.2)
BASOPHILS NFR BLD: 1 % (ref 0–2)
BUN SERPL-MCNC: 35 MG/DL (ref 8–23)
BUN/CREAT SERPL: 18 (ref 9–20)
CALCIUM SERPL-MCNC: 8.9 MG/DL (ref 8.6–10.4)
CHLORIDE SERPL-SCNC: 107 MMOL/L (ref 98–107)
CO2 SERPL-SCNC: 19 MMOL/L (ref 20–31)
CREAT SERPL-MCNC: 2 MG/DL (ref 0.7–1.2)
EOSINOPHIL # BLD: 0.26 K/UL (ref 0–0.44)
EOSINOPHILS RELATIVE PERCENT: 4 % (ref 1–4)
ERYTHROCYTE [DISTWIDTH] IN BLOOD BY AUTOMATED COUNT: 15.1 % (ref 11.8–14.4)
GFR, ESTIMATED: 31 ML/MIN/1.73M2
GLUCOSE SERPL-MCNC: 81 MG/DL (ref 74–99)
HCT VFR BLD AUTO: 35.4 % (ref 40.7–50.3)
HGB BLD-MCNC: 11.6 G/DL (ref 13–17)
IMM GRANULOCYTES # BLD AUTO: 0.06 K/UL (ref 0–0.3)
IMM GRANULOCYTES NFR BLD: 1 %
LYMPHOCYTES NFR BLD: 1.12 K/UL (ref 1.1–3.7)
LYMPHOCYTES RELATIVE PERCENT: 16 % (ref 24–43)
MCH RBC QN AUTO: 27.8 PG (ref 25.2–33.5)
MCHC RBC AUTO-ENTMCNC: 32.8 G/DL (ref 28.4–34.8)
MCV RBC AUTO: 84.7 FL (ref 82.6–102.9)
MONOCYTES NFR BLD: 0.52 K/UL (ref 0.1–1.2)
MONOCYTES NFR BLD: 7 % (ref 3–12)
NEUTROPHILS NFR BLD: 71 % (ref 36–65)
NEUTS SEG NFR BLD: 5 K/UL (ref 1.5–8.1)
NRBC BLD-RTO: 0 PER 100 WBC
PLATELET # BLD AUTO: 175 K/UL (ref 138–453)
PMV BLD AUTO: 10.1 FL (ref 8.1–13.5)
POTASSIUM SERPL-SCNC: 4.6 MMOL/L (ref 3.7–5.3)
RBC # BLD AUTO: 4.18 M/UL (ref 4.21–5.77)
SODIUM SERPL-SCNC: 137 MMOL/L (ref 136–145)
WBC OTHER # BLD: 7 K/UL (ref 3.5–11.3)

## 2024-12-22 PROCEDURE — 85025 COMPLETE CBC W/AUTO DIFF WBC: CPT

## 2024-12-22 PROCEDURE — 6370000000 HC RX 637 (ALT 250 FOR IP): Performed by: NURSE PRACTITIONER

## 2024-12-22 PROCEDURE — 1200000000 HC SEMI PRIVATE

## 2024-12-22 PROCEDURE — 97110 THERAPEUTIC EXERCISES: CPT

## 2024-12-22 PROCEDURE — 97535 SELF CARE MNGMENT TRAINING: CPT

## 2024-12-22 PROCEDURE — 80048 BASIC METABOLIC PNL TOTAL CA: CPT

## 2024-12-22 PROCEDURE — 2500000003 HC RX 250 WO HCPCS: Performed by: NURSE PRACTITIONER

## 2024-12-22 PROCEDURE — 36415 COLL VENOUS BLD VENIPUNCTURE: CPT

## 2024-12-22 PROCEDURE — 94761 N-INVAS EAR/PLS OXIMETRY MLT: CPT

## 2024-12-22 PROCEDURE — 6360000002 HC RX W HCPCS: Performed by: NURSE PRACTITIONER

## 2024-12-22 PROCEDURE — 97116 GAIT TRAINING THERAPY: CPT

## 2024-12-22 RX ADMIN — ZINC SULFATE 220 MG (50 MG) CAPSULE 50 MG: CAPSULE at 08:25

## 2024-12-22 RX ADMIN — SODIUM CHLORIDE, PRESERVATIVE FREE 10 ML: 5 INJECTION INTRAVENOUS at 08:25

## 2024-12-22 RX ADMIN — ISOSORBIDE MONONITRATE 30 MG: 30 TABLET, EXTENDED RELEASE ORAL at 08:25

## 2024-12-22 RX ADMIN — METOPROLOL SUCCINATE 50 MG: 50 TABLET, EXTENDED RELEASE ORAL at 08:24

## 2024-12-22 RX ADMIN — CLOPIDOGREL BISULFATE 75 MG: 75 TABLET ORAL at 08:25

## 2024-12-22 RX ADMIN — SODIUM CHLORIDE, PRESERVATIVE FREE 10 ML: 5 INJECTION INTRAVENOUS at 20:37

## 2024-12-22 RX ADMIN — AMLODIPINE BESYLATE 5 MG: 5 TABLET ORAL at 08:25

## 2024-12-22 RX ADMIN — ENOXAPARIN SODIUM 30 MG: 100 INJECTION SUBCUTANEOUS at 08:25

## 2024-12-22 NOTE — PROGRESS NOTES
Telesitter discontinued at this time. Pt being calm and cooperative at this time. Will continue to monitor.

## 2024-12-22 NOTE — PLAN OF CARE
Problem: Chronic Conditions and Co-morbidities  Goal: Patient's chronic conditions and co-morbidity symptoms are monitored and maintained or improved  12/22/2024 0835 by Kelly Smith RN  Outcome: Progressing     Problem: Discharge Planning  Goal: Discharge to home or other facility with appropriate resources  12/22/2024 0835 by Kelly Smith RN  Outcome: Progressing     Problem: Safety - Adult  Goal: Free from fall injury  12/22/2024 0835 by Kelly Smith RN  Outcome: Progressing     Problem: Skin/Tissue Integrity  Goal: Absence of new skin breakdown  Description: 1.  Monitor for areas of redness and/or skin breakdown  2.  Assess vascular access sites hourly  3.  Every 4-6 hours minimum:  Change oxygen saturation probe site  4.  Every 4-6 hours:  If on nasal continuous positive airway pressure, respiratory therapy assess nares and determine need for appliance change or resting period.  12/22/2024 0835 by Kelly Smith RN  Outcome: Progressing     Problem: ABCDS Injury Assessment  Goal: Absence of physical injury  12/22/2024 0835 by Kelly Smith RN  Outcome: Progressing     Problem: Nutrition Deficit:  Goal: Optimize nutritional status  12/22/2024 0835 by Kelly Smith RN  Outcome: Progressing

## 2024-12-22 NOTE — PROGRESS NOTES
Occupational Therapy  Facility/Department: [facility] MED SURG  Daily Treatment Note  NAME: Cameron Ortiz  : 1935  MRN: [id number]    Date of Service: 2024    Discharge Recommendations:  Continue to assess pending progress         Patient Diagnosis(es): The primary encounter diagnosis was Encephalopathy acute. Diagnoses of Pneumonia of left lower lobe due to infectious organism, Acute cystitis without hematuria, and Chronic kidney disease, unspecified CKD stage were also pertinent to this visit.     Assessment   Activity Tolerance: Patient tolerated treatment well  Discharge Recommendations: Continue to assess pending progress     Plan  Occupational Therapy Plan  Times Per Day: Once a day  Days Per Week: 7 Days  Current Treatment Recommendations: Strengthening;ROM;Balance training;Functional mobility training;Endurance training;Cognitive reorientation;Safety education & training;Equipment evaluation, education, & procurement;Patient/Caregiver education & training;Self-Care / ADL    Restrictions  Restrictions/Precautions  Restrictions/Precautions: Bed Alarm;Fall Risk    Subjective  Subjective  Subjective: Pt sitting up in bedside chair upon arrival. Pt agreed to participate in therapy session.  Pain: Pt had no complaints of pain.          Objective  Vitals           ADL  Functional Mobility: Contact guard assistance  Functional Mobility Skilled Clinical Factors: CGA to complete func mob with quad cane  Additional Comments: CGA to complete sit to stand from recliner.  OT Exercises  Exercise Treatment: Pt completed BUE ther ex x 5 planes x 15 reps x 1 set to increase UE strength and endurance in order to ease completion of ADL tasks. Pt required RBs as needed secondary to fatigue.     Safety Devices  Type of Devices: All fall risk precautions in place;Call light within reach;Left in chair;Chair alarm in place;Nurse notified;Telesitter in use    Patient Education  Education Given To: Patient  Education Provided:

## 2024-12-22 NOTE — PROGRESS NOTES
Patient used call light appropriately to call out for the restroom. This writer and CLINT Hansen entered room to assist patient to restroom. Patient back to bed resting comfortably. Call light is within reach, care is ongoing.

## 2024-12-22 NOTE — PROGRESS NOTES
Lloyd Wade M.D.  Internal Medicine Progress Note    Patient: Cameron Ortiz  Date of Admission: 12/19/2024  8:31 AM  Date of Evaluation: 12/22/2024      SUBJECTIVE:    Cameron Ortiz is a 89 y.o. male who was seen today for follow up of Acute kidney injury superimposed on CKD (HCC).  Per nursing pt became much more alert and oriented.  Telesitter was DC'd this morning at 0700 as he was found to be alert and oriented x 4 and cooperative after sleeping well last night.  This morning he states he feels much better.  He ambulated with PT without any difficulty.      ROS:   Constitutional: negative  for fevers, and negative for chills.  Respiratory: negative for shortness of breath, negative for cough, and negative for wheezing  Cardiovascular: negative for chest pain, and negative for palpitations  Gastrointestinal: negative for abdominal pain, negative for nausea,negative for vomiting, negative for diarrhea, and negative for constipation     All other systems were reviewed with the patient and are negative unless otherwise stated in HPI    -----------------------------------------------------------------  OBJECTIVE:  Vitals:   Temp: 97.8 °F (36.6 °C)  BP: (!) 143/84  Respirations: 18  Pulse: 88  SpO2: 98 % on room air    Weight  Wt Readings from Last 3 Encounters:   12/22/24 69.2 kg (152 lb 8 oz)   12/13/24 68 kg (150 lb)   12/11/24 68.9 kg (152 lb)     Body mass index is 23.19 kg/m².    24HR INTAKE/OUTPUT:      Intake/Output Summary (Last 24 hours) at 12/22/2024 0943  Last data filed at 12/22/2024 0910  Gross per 24 hour   Intake 1600 ml   Output 550 ml   Net 1050 ml       Exam:  GEN:    Awake and alert.  Oriented x 4.    EYES:  EOMI, pupils equal   NECK: Supple. No lymphadenopathy.  No carotid bruit  CVS:    regular rate and rhythm, no audible murmur  PULM:  CTA, no wheezes, rales or rhonchi, no acute respiratory distress  ABD:    Bowels sounds normal.  Abdomen is soft.  No distention.  no tenderness to palpation.

## 2024-12-22 NOTE — PLAN OF CARE
Problem: Chronic Conditions and Co-morbidities  Goal: Patient's chronic conditions and co-morbidity symptoms are monitored and maintained or improved  12/21/2024 1917 by Meli Choudhary RN  Outcome: Progressing     Problem: Discharge Planning  Goal: Discharge to home or other facility with appropriate resources  12/21/2024 1917 by Meli Choudhary RN  Outcome: Progressing     Problem: Safety - Adult  Goal: Free from fall injury  12/21/2024 1917 by Meli Choudhary RN  Outcome: Progressing     Problem: Skin/Tissue Integrity  Goal: Absence of new skin breakdown  Description: 1.  Monitor for areas of redness and/or skin breakdown  2.  Assess vascular access sites hourly  3.  Every 4-6 hours minimum:  Change oxygen saturation probe site  4.  Every 4-6 hours:  If on nasal continuous positive airway pressure, respiratory therapy assess nares and determine need for appliance change or resting period.  12/21/2024 1917 by Meli Choudhary RN  Outcome: Progressing     Problem: ABCDS Injury Assessment  Goal: Absence of physical injury  12/21/2024 1917 by Meli Choudhary RN  Outcome: Progressing     Problem: Nutrition Deficit:  Goal: Optimize nutritional status  12/21/2024 1917 by Meli Choudhary RN  Outcome: Progressing

## 2024-12-22 NOTE — PROGRESS NOTES
Physical Therapy  Facility/Department: [facility] MED SURG  Daily Treatment Note  NAME: Cameron Ortiz  : 1935  MRN: [id number]    Date of Service: 2024    Discharge Recommendations:  Continue to assess pending progress     Patient Diagnosis(es): The primary encounter diagnosis was Encephalopathy acute. Diagnoses of Pneumonia of left lower lobe due to infectious organism, Acute cystitis without hematuria, and Chronic kidney disease, unspecified CKD stage were also pertinent to this visit.    Assessment  Assessment: Pt. ambulated 40ftx1 with FWW and CGAx1 for safety. Slow shuffled cadance, decreased step length and height with NBOS. No LOB with mild unsteadiness. Pt. completed seated BLE therex x15 in all planes of motion with RB throughout d/t fatigue.  Activity Tolerance: Patient tolerated treatment well    Plan  Physical Therapy Plan  General Plan: 2 times a day 7 days a week  Specific Instructions for Next Treatment: Once daily on weekends  Current Treatment Recommendations: Strengthening;ROM;Balance training;Functional mobility training;Transfer training;Neuromuscular re-education;Stair training;Gait training;Home exercise program;Safety education & training;Patient/Caregiver education & training;Manual;Therapeutic activities;Endurance training    Restrictions  Restrictions/Precautions  Restrictions/Precautions: Bed Alarm, Fall Risk     Subjective   Orientation  Overall Orientation Status: Within Functional Limits    Objective  Bed Mobility Training  Bed Mobility Training: No  Transfer Training  Transfer Training: Yes  Overall Level of Assistance: Contact-guard assistance;Assist X1  Interventions: Demonstration;Verbal cues  Sit to Stand: Contact-guard assistance;Assist X1  Stand to Sit: Contact-guard assistance;Assist X1  Gait  Gait Training: Yes  Overall Level of Assistance: Contact-guard assistance;Assist X1  Distance (ft): 40 Feet  Assistive Device: Cane, quad;Gait belt  Interventions: Safety awareness  training;Tactile cues;Verbal cues  Base of Support: Narrowed  Speed/Juana: Slow  Step Length: Left shortened;Right shortened  PT Exercises  Exercise Treatment: seated BLE therex x15 in all planes of motion  Safety Devices  Type of Devices: All fall risk precautions in place;Call light within reach;Left in chair;Chair alarm in place;Nurse notified      Goals  Short Term Goals  Time Frame for Short Term Goals: 20 days  Short Term Goal 1: Patient to complete all transfers with SUP and no LOB to decrease fall risk.  Short Term Goal 2: Patient to ambulate 50ftx2 with QC or LRAD, SUP and no LOB or fatigue for improved mobility.  Short Term Goal 3: Patient to tolerate 20-30 min of ther ex/act for improved strength.  Short Term Goal 4: Patient to ascend/descend 2 steps with HRx1 and CGA with no LOB to safely enter his home.    Education  Patient Education  Education Given To: Patient  Education Provided: Transfer Training;Fall Prevention Strategies;Role of Therapy  Education Method: Verbal;Demonstration  Barriers to Learning: Cognition  Education Outcome: Continued education needed;Unable to demonstrate understanding    Therapy Time   Individual Concurrent Group Co-treatment   Time In 0933         Time Out 0958         Minutes 25             Caty Rene, MARGARITA

## 2024-12-23 VITALS
TEMPERATURE: 97.4 F | BODY MASS INDEX: 22.52 KG/M2 | HEART RATE: 72 BPM | WEIGHT: 148.6 LBS | SYSTOLIC BLOOD PRESSURE: 148 MMHG | RESPIRATION RATE: 18 BRPM | HEIGHT: 68 IN | DIASTOLIC BLOOD PRESSURE: 82 MMHG | OXYGEN SATURATION: 95 %

## 2024-12-23 DIAGNOSIS — R42 DIZZINESS AND GIDDINESS: ICD-10-CM

## 2024-12-23 DIAGNOSIS — R06.02 SOB (SHORTNESS OF BREATH): ICD-10-CM

## 2024-12-23 DIAGNOSIS — R07.89 ATYPICAL CHEST PAIN: ICD-10-CM

## 2024-12-23 DIAGNOSIS — R53.83 LACK OF ENERGY: ICD-10-CM

## 2024-12-23 DIAGNOSIS — G45.9 TIA (TRANSIENT ISCHEMIC ATTACK): ICD-10-CM

## 2024-12-23 DIAGNOSIS — R53.83 OTHER FATIGUE: ICD-10-CM

## 2024-12-23 DIAGNOSIS — N18.32 STAGE 3B CHRONIC KIDNEY DISEASE (HCC): ICD-10-CM

## 2024-12-23 DIAGNOSIS — I10 ESSENTIAL HYPERTENSION: ICD-10-CM

## 2024-12-23 DIAGNOSIS — Z78.9 STATIN INTOLERANCE: ICD-10-CM

## 2024-12-23 DIAGNOSIS — I25.10 ASHD (ARTERIOSCLEROTIC HEART DISEASE): ICD-10-CM

## 2024-12-23 DIAGNOSIS — E78.2 MIXED HYPERLIPIDEMIA: ICD-10-CM

## 2024-12-23 LAB
ANION GAP SERPL CALCULATED.3IONS-SCNC: 10 MMOL/L (ref 9–16)
BASOPHILS # BLD: 0.05 K/UL (ref 0–0.2)
BASOPHILS NFR BLD: 1 % (ref 0–2)
BUN SERPL-MCNC: 40 MG/DL (ref 8–23)
BUN/CREAT SERPL: 18 (ref 9–20)
CALCIUM SERPL-MCNC: 9 MG/DL (ref 8.6–10.4)
CHLORIDE SERPL-SCNC: 109 MMOL/L (ref 98–107)
CO2 SERPL-SCNC: 20 MMOL/L (ref 20–31)
CREAT SERPL-MCNC: 2.2 MG/DL (ref 0.7–1.2)
EOSINOPHIL # BLD: 0.23 K/UL (ref 0–0.44)
EOSINOPHILS RELATIVE PERCENT: 4 % (ref 1–4)
ERYTHROCYTE [DISTWIDTH] IN BLOOD BY AUTOMATED COUNT: 15.3 % (ref 11.8–14.4)
GFR, ESTIMATED: 28 ML/MIN/1.73M2
GLUCOSE SERPL-MCNC: 83 MG/DL (ref 74–99)
HCT VFR BLD AUTO: 34.9 % (ref 40.7–50.3)
HGB BLD-MCNC: 11.4 G/DL (ref 13–17)
IMM GRANULOCYTES # BLD AUTO: 0.06 K/UL (ref 0–0.3)
IMM GRANULOCYTES NFR BLD: 1 %
LYMPHOCYTES NFR BLD: 1.22 K/UL (ref 1.1–3.7)
LYMPHOCYTES RELATIVE PERCENT: 18 % (ref 24–43)
MCH RBC QN AUTO: 27.3 PG (ref 25.2–33.5)
MCHC RBC AUTO-ENTMCNC: 32.7 G/DL (ref 28.4–34.8)
MCV RBC AUTO: 83.5 FL (ref 82.6–102.9)
MONOCYTES NFR BLD: 0.56 K/UL (ref 0.1–1.2)
MONOCYTES NFR BLD: 9 % (ref 3–12)
NEUTROPHILS NFR BLD: 67 % (ref 36–65)
NEUTS SEG NFR BLD: 4.5 K/UL (ref 1.5–8.1)
NRBC BLD-RTO: 0 PER 100 WBC
PLATELET # BLD AUTO: 196 K/UL (ref 138–453)
PMV BLD AUTO: 10.3 FL (ref 8.1–13.5)
POTASSIUM SERPL-SCNC: 4.6 MMOL/L (ref 3.7–5.3)
RBC # BLD AUTO: 4.18 M/UL (ref 4.21–5.77)
SODIUM SERPL-SCNC: 139 MMOL/L (ref 136–145)
WBC OTHER # BLD: 6.6 K/UL (ref 3.5–11.3)

## 2024-12-23 PROCEDURE — 80048 BASIC METABOLIC PNL TOTAL CA: CPT

## 2024-12-23 PROCEDURE — 6360000002 HC RX W HCPCS: Performed by: NURSE PRACTITIONER

## 2024-12-23 PROCEDURE — 36415 COLL VENOUS BLD VENIPUNCTURE: CPT

## 2024-12-23 PROCEDURE — 97116 GAIT TRAINING THERAPY: CPT

## 2024-12-23 PROCEDURE — 97110 THERAPEUTIC EXERCISES: CPT

## 2024-12-23 PROCEDURE — 2500000003 HC RX 250 WO HCPCS: Performed by: NURSE PRACTITIONER

## 2024-12-23 PROCEDURE — 85025 COMPLETE CBC W/AUTO DIFF WBC: CPT

## 2024-12-23 PROCEDURE — 6370000000 HC RX 637 (ALT 250 FOR IP): Performed by: NURSE PRACTITIONER

## 2024-12-23 PROCEDURE — 94761 N-INVAS EAR/PLS OXIMETRY MLT: CPT

## 2024-12-23 RX ORDER — POLYETHYLENE GLYCOL 3350 17 G/17G
17 POWDER, FOR SOLUTION ORAL DAILY PRN
DISCHARGE
Start: 2024-12-23 | End: 2025-01-22

## 2024-12-23 RX ADMIN — METOPROLOL SUCCINATE 50 MG: 50 TABLET, EXTENDED RELEASE ORAL at 09:10

## 2024-12-23 RX ADMIN — AMLODIPINE BESYLATE 5 MG: 5 TABLET ORAL at 09:11

## 2024-12-23 RX ADMIN — ENOXAPARIN SODIUM 30 MG: 100 INJECTION SUBCUTANEOUS at 09:11

## 2024-12-23 RX ADMIN — CLOPIDOGREL BISULFATE 75 MG: 75 TABLET ORAL at 09:11

## 2024-12-23 RX ADMIN — ZINC SULFATE 220 MG (50 MG) CAPSULE 50 MG: CAPSULE at 09:11

## 2024-12-23 RX ADMIN — SODIUM CHLORIDE, PRESERVATIVE FREE 10 ML: 5 INJECTION INTRAVENOUS at 09:11

## 2024-12-23 RX ADMIN — ISOSORBIDE MONONITRATE 30 MG: 30 TABLET, EXTENDED RELEASE ORAL at 09:12

## 2024-12-23 NOTE — PROGRESS NOTES
Progress Note    SUBJECTIVE:    Patient seen for f/u of Acute kidney injury superimposed on CKD (HCC).  He resting in bed no distress. Alert and oriented and converses appropriately.      ROS:   Constitutional: negative  for fevers, and negative for chills.  Respiratory: negative for shortness of breath, negative for cough, and negative for wheezing  Cardiovascular: negative for chest pain, and negative for palpitations  Gastrointestinal: negative for abdominal pain, negative for nausea,negative for vomiting, negative for diarrhea, and negative for constipation     All other systems were reviewed with the patient and are negative unless otherwise stated in HPI      OBJECTIVE:      Vitals:   Vitals:    12/22/24 1907   BP: (!) 102/58   Pulse: 63   Resp: 20   Temp: 97.5 °F (36.4 °C)   SpO2: 94%     Weight - Scale: 67.4 kg (148 lb 9.6 oz)   Height: 172.7 cm (5' 8\")     Weight  Wt Readings from Last 3 Encounters:   12/23/24 67.4 kg (148 lb 9.6 oz)   12/13/24 68 kg (150 lb)   12/11/24 68.9 kg (152 lb)     Body mass index is 22.59 kg/m².    24HR INTAKE/OUTPUT:      Intake/Output Summary (Last 24 hours) at 12/23/2024 0731  Last data filed at 12/22/2024 2037  Gross per 24 hour   Intake 850 ml   Output 200 ml   Net 650 ml     -----------------------------------------------------------------  Exam:    GEN:    Awake, alert and oriented x 3 and appropriate  EYES:  EOMI, pupils equal   NECK: Supple. No lymphadenopathy.  No carotid bruit  CVS:    regular rate and rhythm, no audible murmur  PULM:  CTA, no wheezes, rales or rhonchi, no acute respiratory distress  ABD:    Bowels sounds normal.  Abdomen is soft.  No distention.  no tenderness to palpation.   EXT:   no edema bilaterally .  No calf tenderness.   NEURO: Moves all extremities.  Motor and sensory are grossly intact  SKIN:  No rashes.  No skin lesions.    -----------------------------------------------------------------    Diagnostic Data:      Complete Blood Count:

## 2024-12-23 NOTE — PROGRESS NOTES
Pt resting in bed, denies needs at this time. Vitals and assessment completed. Call light in reach

## 2024-12-23 NOTE — PLAN OF CARE
Problem: Chronic Conditions and Co-morbidities  Goal: Patient's chronic conditions and co-morbidity symptoms are monitored and maintained or improved  12/22/2024 1943 by Evelyn Vaca RN  Outcome: Progressing  Flowsheets  Taken 12/22/2024 1943  Care Plan - Patient's Chronic Conditions and Co-Morbidity Symptoms are Monitored and Maintained or Improved: Monitor and assess patient's chronic conditions and comorbid symptoms for stability, deterioration, or improvement  Taken 12/22/2024 1907  Care Plan - Patient's Chronic Conditions and Co-Morbidity Symptoms are Monitored and Maintained or Improved: Monitor and assess patient's chronic conditions and comorbid symptoms for stability, deterioration, or improvement       Problem: Safety - Adult  Goal: Free from fall injury  12/22/2024 1943 by Evelyn Vaca RN  Outcome: Progressing  Note: Bed alarm on, bed locked, side rails up, gripper socks on, call light within reach and able to use appropriately. Will continue to monitor this shift.     Problem: Skin/Tissue Integrity  Goal: Absence of new skin breakdown  Description: 1.  Monitor for areas of redness and/or skin breakdown  2.  Assess vascular access sites hourly  3.  Every 4-6 hours minimum:  Change oxygen saturation probe site  4.  Every 4-6 hours:  If on nasal continuous positive airway pressure, respiratory therapy assess nares and determine need for appliance change or resting period.  12/22/2024 1943 by Evelyn Vaca RN  Outcome: Progressing  Note: Patient able to reposition self at this time and use call light appropriately for any assistance. Will continue to monitor this shift     Problem: ABCDS Injury Assessment  Goal: Absence of physical injury  12/22/2024 1943 by Evelyn Vaca RN  Outcome: Progressing  Note: Patient is encouraged to reposition and assessed for any injuries. Will continue to monitor this shift.     Problem: Nutrition Deficit:  Goal: Optimize nutritional status  12/22/2024 1943 by Evelyn Vaca  RN  Outcome: Progressing  Flowsheets (Taken 12/22/2024 1943)  Nutrient intake appropriate for improving, restoring, or maintaining nutritional needs: Assess nutritional status and recommend course of action  Note: Encouraging foods and liquids as tolerated. Will continue to monitor this shift.

## 2024-12-23 NOTE — CARE COORDINATION
IMM letter provided to patient.  Patient offered four hours to make informed decision regarding appeal process; patient agreeable to discharge.       12/23/24 1033   IMM Letter   IMM Letter given to Patient/Family/Significant other/Guardian/POA/by: second - patient/ Arias MUÑOZ   IMM Letter date given: 12/23/24   IMM Letter time given: 1020     Patient is discharge today to the [facility].  The family will provide the transporttation.  [facility] aware of the discharge.    TONI Coleman

## 2024-12-23 NOTE — TELEPHONE ENCOUNTER
Lidia called in to report that Cameron hasn't been taking the Imdur for quite sometime but we have it on his med list. Do you want him taking this? If so, refill is pended. Please advise.

## 2024-12-23 NOTE — PROGRESS NOTES
Occupational Therapy  Facility/Department: [facility] MED SURG  Daily Treatment Note  NAME: Cameron Ortiz  : 1935  MRN: [id number]    Date of Service: 2024    Discharge Recommendations:  Continue to assess pending progress         Patient Diagnosis(es): The primary encounter diagnosis was Encephalopathy acute. Diagnoses of Pneumonia of left lower lobe due to infectious organism, Acute cystitis without hematuria, and Chronic kidney disease, unspecified CKD stage were also pertinent to this visit.     Assessment   Activity Tolerance: Patient tolerated treatment well  Discharge Recommendations: Continue to assess pending progress     Plan  Occupational Therapy Plan  Times Per Day: Once a day  Days Per Week: 7 Days  Current Treatment Recommendations: Strengthening;ROM;Balance training;Functional mobility training;Endurance training;Cognitive reorientation;Safety education & training;Equipment evaluation, education, & procurement;Patient/Caregiver education & training;Self-Care / ADL    Restrictions  Restrictions/Precautions  Restrictions/Precautions: Bed Alarm;Fall Risk    Subjective  Subjective  Subjective: Pt sitting up in bedside chair upon arrival. Pt agreed to participate in therapy session.  Pain: Pt had no complaints of pain.            Objective  Vitals          OT Exercises  Exercise Treatment: Pt completed BUE ther ex with 1# dumbbell x 7 planes x 15 reps x 1 set to increase UE strength and endurance in order to ease completion of ADL tasks. Pt required RBs as needed secondary to fatigue.     Safety Devices  Type of Devices: All fall risk precautions in place;Call light within reach;Left in chair;Chair alarm in place;Nurse notified    Patient Education  Education Given To: Patient  Education Provided: Role of Therapy;Plan of Care  Education Method: Verbal  Barriers to Learning: Cognition  Education Outcome: Verbalized understanding    Goals  Short Term Goals  Time Frame for Short Term Goals: 21  [facility] [address] [place] [address]  Phone: [phone]              3/15/2017      Kourtney Rondon      TO WHOM IT MAY CONCERN:    Kourtney Rondon was seen today for rotator cuff syndrome of the left shoulder.  She should be restricted to lifting no more than 5#, and no higher than her waist.         Nathan Morris M.D.    [facility]

## 2024-12-23 NOTE — DISCHARGE SUMMARY
Discharge Summary    Cameron Ortiz  :  1935  MRN:  [id number]    Admit date:  2024      Discharge date: 2024     Admitting Physician:  David Otto MD    Discharge Diagnoses:    Principal Problem:    Acute kidney injury superimposed on CKD (HCC)  Active Problems:    Stage 3b chronic kidney disease (HCC)    Chronic systolic (congestive) heart failure    Chronic stable angina (HCC)    Non-ischemic cardiomyopathy (HCC)    Visual hallucinations    Acute cystitis without hematuria  Resolved Problems:    * No resolved hospital problems. *      Hospital Course:   Cameron Ortiz is a 89 y.o. male admitted with CARLO on CKD. He  presented with complaints of hallucinations that have been progressing. These are visual in nature.  Patient recently was placed on Bactrim DS for cellulitis of legs.  Patient reported over the past few days his hallucinations have been worsening. He complains of vertigo and falls at home.  He reported his dizziness has been ongoing for months and does follow with Neurology.  Complained of chills but no reported fevers.  No GI/ issues.  Denied SOB. Denied CP or palpitations.  During his evaluation he was found to have CARLO on CKD with BUN of 41 and Creatinine of 2.4.  Baseline BUN is 21-43 and Creatinine 1.26-2.1.  Troponin was elevated at 56 awaiting recheck.  Up from baseline of 40-47.  CBC unremarkable.  Urinalysis showed WBC 2-5, small leukocytes, negative nitrates.  Chest xray showed rigt pleural effusion with right parahilar opacity.  ER provider concerned for UTI and PN and started on Rocephin and Zithromax.  No ill contacts reported. Patient was admitted labs monitored electrolytes replaced. Patient's mental status waxed and waned with hallucinations and confusion. Bactrim was stopped and he was placed on empiric Rocephin and IV fluids. Renal function is back to baseline. Urine culture and blood cultures came back negative and antibiotics were stopped. Patient's mentation has

## 2024-12-23 NOTE — PROGRESS NOTES
Physical Therapy  Facility/Department: [facility] MED SURG  Daily Treatment Note  NAME: Cameron Ortiz  : 1935  MRN: [id number]    Date of Service: 2024    Discharge Recommendations:  Continue to assess pending progress     Patient Diagnosis(es): The primary encounter diagnosis was Encephalopathy acute. Diagnoses of Pneumonia of left lower lobe due to infectious organism, Acute cystitis without hematuria, and Chronic kidney disease, unspecified CKD stage were also pertinent to this visit.    Assessment  Assessment: Pt. ambulated 40ftx3 with FWW and CGAx1 for safety. Slow shuffled cadance, decreased step length and height with NBOS. No LOB with mild unsteadiness. Pt. completed Supine BLE therex x15 in all planes of motion. Educated pt. on use of WW for better support and decreased fall risk d/t pt. reporting pain in L hip with movement, pt. declines use of WW.  Activity Tolerance: Patient tolerated treatment well    Plan  Physical Therapy Plan  General Plan: 2 times a day 7 days a week  Specific Instructions for Next Treatment: Once daily on weekends  Current Treatment Recommendations: Strengthening;ROM;Balance training;Functional mobility training;Transfer training;Neuromuscular re-education;Stair training;Gait training;Home exercise program;Safety education & training;Patient/Caregiver education & training;Manual;Therapeutic activities;Endurance training    Restrictions  Restrictions/Precautions  Restrictions/Precautions: Bed Alarm, Fall Risk     Subjective   Orientation  Overall Orientation Status: Within Functional Limits    Objective  Bed Mobility Training  Bed Mobility Training: Yes  Overall Level of Assistance: Contact-guard assistance;Assist X1;Additional time  Interventions: Verbal cues  Rolling: Contact-guard assistance;Assist X1;Additional time  Supine to Sit: Contact-guard assistance;Assist X1;Additional time  Transfer Training  Transfer Training: Yes  Overall Level of Assistance: Contact-guard

## 2024-12-24 LAB
MICROORGANISM SPEC CULT: NORMAL
MICROORGANISM SPEC CULT: NORMAL
SERVICE CMNT-IMP: NORMAL
SERVICE CMNT-IMP: NORMAL
SPECIMEN DESCRIPTION: NORMAL
SPECIMEN DESCRIPTION: NORMAL

## 2024-12-24 RX ORDER — ISOSORBIDE MONONITRATE 30 MG/1
30 TABLET, EXTENDED RELEASE ORAL DAILY
Qty: 90 TABLET | Refills: 3 | Status: SHIPPED | OUTPATIENT
Start: 2024-12-24

## 2024-12-24 NOTE — TELEPHONE ENCOUNTER
No chest pain recently & she thinks it's running in the 150's. He is currently at [facility] - she is going to call to obtain readings and call me back.     He was just admitted to [facility] yesterday but readings so far are    114/71  120/65  142/76

## 2025-03-25 ENCOUNTER — CLINICAL SUPPORT (OUTPATIENT)
Dept: CARDIOLOGY | Age: 89
End: 2025-03-25

## 2025-03-25 DIAGNOSIS — Z95.0 PACEMAKER: Primary | ICD-10-CM

## 2025-03-25 DIAGNOSIS — R00.1 SYMPTOMATIC BRADYCARDIA: ICD-10-CM

## 2025-05-07 ENCOUNTER — HOSPITAL ENCOUNTER (OUTPATIENT)
Dept: VASCULAR LAB | Age: 89
Discharge: HOME | End: 2025-05-09
Payer: MEDICARE

## 2025-05-07 DIAGNOSIS — R42 DIZZINESS: ICD-10-CM

## 2025-05-07 LAB
VAS LEFT CCA DIST EDV: 13.9 CM/S
VAS LEFT CCA DIST PSV: 55.7 CM/S
VAS LEFT CCA MID EDV: 10.61 CM/S
VAS LEFT CCA MID PSV: 47.62 CM/S
VAS LEFT CCA PROX EDV: 10.9 CM/S
VAS LEFT CCA PROX PSV: 71.3 CM/S
VAS LEFT ECA EDV: 0 CM/S
VAS LEFT ECA PSV: 76.8 CM/S
VAS LEFT ICA DIST EDV: 14.4 CM/S
VAS LEFT ICA DIST PSV: 114.5 CM/S
VAS LEFT ICA MID EDV: 30.5 CM/S
VAS LEFT ICA MID PSV: 121 CM/S
VAS LEFT ICA PROX EDV: 17.6 CM/S
VAS LEFT ICA PROX PSV: 104.9 CM/S
VAS LEFT ICA/CCA PSV: 2.17 NO UNITS
VAS LEFT SUBCLAVIAN PROX EDV: 2.1 CM/S
VAS LEFT SUBCLAVIAN PROX PSV: 57.2 CM/S
VAS LEFT VERTEBRAL EDV: 6.33 CM/S
VAS LEFT VERTEBRAL PSV: 34.8 CM/S
VAS RIGHT CCA DIST EDV: 7.1 CM/S
VAS RIGHT CCA DIST PSV: 44.1 CM/S
VAS RIGHT CCA MID EDV: 8.64 CM/S
VAS RIGHT CCA MID PSV: 53.13 CM/S
VAS RIGHT CCA PROX EDV: 9.5 CM/S
VAS RIGHT CCA PROX PSV: 51.4 CM/S
VAS RIGHT ECA EDV: 2.09 CM/S
VAS RIGHT ECA PSV: 83.4 CM/S
VAS RIGHT ICA DIST EDV: 15.3 CM/S
VAS RIGHT ICA DIST PSV: 79 CM/S
VAS RIGHT ICA MID EDV: 14.2 CM/S
VAS RIGHT ICA MID PSV: 66.9 CM/S
VAS RIGHT ICA PROX EDV: 12.1 CM/S
VAS RIGHT ICA PROX PSV: 57.5 CM/S
VAS RIGHT ICA/CCA PSV: 1.8 NO UNITS
VAS RIGHT VERTEBRAL EDV: 10.38 CM/S
VAS RIGHT VERTEBRAL PSV: 56.6 CM/S

## 2025-05-07 PROCEDURE — 93880 EXTRACRANIAL BILAT STUDY: CPT

## 2025-05-07 PROCEDURE — 93880 EXTRACRANIAL BILAT STUDY: CPT | Performed by: INTERNAL MEDICINE

## 2025-07-01 ENCOUNTER — CLINICAL SUPPORT (OUTPATIENT)
Dept: CARDIOLOGY | Age: 89
End: 2025-07-01
Payer: MEDICARE

## 2025-07-01 DIAGNOSIS — Z95.0 PACEMAKER: Primary | ICD-10-CM

## 2025-07-01 DIAGNOSIS — R00.1 SYMPTOMATIC BRADYCARDIA: ICD-10-CM

## 2025-07-01 PROCEDURE — 93296 REM INTERROG EVL PM/IDS: CPT | Performed by: INTERNAL MEDICINE

## (undated) DEVICE — INTRO SAFESHEATH 7FR 25CM W/SIDEPORT

## (undated) DEVICE — BENTSON WIRE GUIDE 20CM DISTAL FLEXIBILITY WITH SOFTENED TIP: Brand: BENTSON

## (undated) DEVICE — CATHETER ANGIO 6FR L100CM DIA0.056IN FR4 CRV VASC ACCS EXPO

## (undated) DEVICE — GLIDESHEATH NITINOL HYDROPHILIC COATED INTRODUCER SHEATH: Brand: GLIDESHEATH

## (undated) DEVICE — SOLUTION IV 100ML 0.9% SOD CHL PLAS CONT USP VIAFLX 1 PER

## (undated) DEVICE — GLOVE ORANGE PI 7 1/2   MSG9075

## (undated) DEVICE — HI-TORQUE VERSACORE MODIFIED J GUIDE WIRE SYSTEM 145 CM: Brand: HI-TORQUE VERSACORE

## (undated) DEVICE — MERCY TIFFIN CATH LAB PACK: Brand: MEDLINE INDUSTRIES, INC.

## (undated) DEVICE — 4F (1.0MM ID) X 9CM STIFF4F (1.0 MICRO-STICK®INTRODUCER SE WITH NITINOL GUIDEWIREWITH NITIN WITH RADIOPAQUE TIPWITH RADIOPAQ: Brand: MICRO-STICK SETMICRO-STICK SET

## (undated) DEVICE — RADIFOCUS OPTITORQUE ANGIOGRAPHIC CATHETER: Brand: OPTITORQUE

## (undated) DEVICE — DRAPE, RADIAL, STERILE: Brand: MEDLINE

## (undated) DEVICE — DEVICE COMPR 17 CM 60CC SAFEGUARD FOCUS ADH LF

## (undated) DEVICE — INTRODUCER SHTH L13CM OD7FR SH ORNG HUB SEAMLESS SAFSHTH

## (undated) DEVICE — C315HIS02 OD7FR ID5.4FR L40CM C315